# Patient Record
Sex: FEMALE | Race: WHITE | Employment: OTHER | URBAN - METROPOLITAN AREA
[De-identification: names, ages, dates, MRNs, and addresses within clinical notes are randomized per-mention and may not be internally consistent; named-entity substitution may affect disease eponyms.]

---

## 2018-03-13 ENCOUNTER — TELEPHONE (OUTPATIENT)
Dept: FAMILY MEDICINE CLINIC | Age: 68
End: 2018-03-13

## 2018-03-14 ENCOUNTER — OFFICE VISIT (OUTPATIENT)
Dept: FAMILY MEDICINE CLINIC | Age: 68
End: 2018-03-14
Payer: MEDICARE

## 2018-03-14 VITALS
SYSTOLIC BLOOD PRESSURE: 132 MMHG | HEART RATE: 93 BPM | HEIGHT: 61 IN | OXYGEN SATURATION: 100 % | BODY MASS INDEX: 33.42 KG/M2 | DIASTOLIC BLOOD PRESSURE: 70 MMHG | TEMPERATURE: 97.2 F | RESPIRATION RATE: 18 BRPM | WEIGHT: 177 LBS

## 2018-03-14 DIAGNOSIS — F32.0 MILD MAJOR DEPRESSION, SINGLE EPISODE (HCC): ICD-10-CM

## 2018-03-14 DIAGNOSIS — B96.89 ACUTE BACTERIAL SINUSITIS: Primary | ICD-10-CM

## 2018-03-14 DIAGNOSIS — F32.A DEPRESSION, UNSPECIFIED DEPRESSION TYPE: ICD-10-CM

## 2018-03-14 DIAGNOSIS — J45.20 MILD INTERMITTENT ASTHMA WITHOUT COMPLICATION: ICD-10-CM

## 2018-03-14 DIAGNOSIS — E78.5 HYPERLIPIDEMIA, UNSPECIFIED HYPERLIPIDEMIA TYPE: ICD-10-CM

## 2018-03-14 DIAGNOSIS — F17.200 TOBACCO DEPENDENCE SYNDROME: ICD-10-CM

## 2018-03-14 DIAGNOSIS — M05.79 RHEUMATOID ARTHRITIS INVOLVING MULTIPLE SITES WITH POSITIVE RHEUMATOID FACTOR (HCC): ICD-10-CM

## 2018-03-14 DIAGNOSIS — J01.90 ACUTE BACTERIAL SINUSITIS: Primary | ICD-10-CM

## 2018-03-14 DIAGNOSIS — F32.9 REACTIVE DEPRESSION: ICD-10-CM

## 2018-03-14 PROCEDURE — G8400 PT W/DXA NO RESULTS DOC: HCPCS | Performed by: FAMILY MEDICINE

## 2018-03-14 PROCEDURE — 1123F ACP DISCUSS/DSCN MKR DOCD: CPT | Performed by: FAMILY MEDICINE

## 2018-03-14 PROCEDURE — 4004F PT TOBACCO SCREEN RCVD TLK: CPT | Performed by: FAMILY MEDICINE

## 2018-03-14 PROCEDURE — 3014F SCREEN MAMMO DOC REV: CPT | Performed by: FAMILY MEDICINE

## 2018-03-14 PROCEDURE — 3017F COLORECTAL CA SCREEN DOC REV: CPT | Performed by: FAMILY MEDICINE

## 2018-03-14 PROCEDURE — G8417 CALC BMI ABV UP PARAM F/U: HCPCS | Performed by: FAMILY MEDICINE

## 2018-03-14 PROCEDURE — 4040F PNEUMOC VAC/ADMIN/RCVD: CPT | Performed by: FAMILY MEDICINE

## 2018-03-14 PROCEDURE — G8482 FLU IMMUNIZE ORDER/ADMIN: HCPCS | Performed by: FAMILY MEDICINE

## 2018-03-14 PROCEDURE — G8427 DOCREV CUR MEDS BY ELIG CLIN: HCPCS | Performed by: FAMILY MEDICINE

## 2018-03-14 PROCEDURE — 99214 OFFICE O/P EST MOD 30 MIN: CPT | Performed by: FAMILY MEDICINE

## 2018-03-14 PROCEDURE — 1090F PRES/ABSN URINE INCON ASSESS: CPT | Performed by: FAMILY MEDICINE

## 2018-03-14 RX ORDER — ATORVASTATIN CALCIUM 20 MG/1
20 TABLET, FILM COATED ORAL DAILY
Qty: 90 TABLET | Refills: 1 | Status: SHIPPED | OUTPATIENT
Start: 2018-03-14 | End: 2018-09-05 | Stop reason: SDUPTHER

## 2018-03-14 RX ORDER — CLINDAMYCIN HYDROCHLORIDE 150 MG/1
150 CAPSULE ORAL 3 TIMES DAILY
Qty: 30 CAPSULE | Refills: 0 | Status: SHIPPED | OUTPATIENT
Start: 2018-03-14 | End: 2018-03-24

## 2018-03-14 RX ORDER — ESCITALOPRAM OXALATE 10 MG/1
10 TABLET ORAL DAILY
Qty: 90 TABLET | Refills: 1 | Status: SHIPPED | OUTPATIENT
Start: 2018-03-14 | End: 2018-09-05 | Stop reason: SDUPTHER

## 2018-03-14 NOTE — PROGRESS NOTES
3/22/2018    Chief Complaint   Patient presents with    Sinusitis     sinus pressure pain           Subjective   patient is here with complaint of sinus infection with puss pocket in mouth, pressure pain in face with headache    She is on asulfadine for  rhem arthritis  1. Acute bacterial sinusitis      2. Hyperlipidemia, unspecified hyperlipidemia type  Tot 191  hdl 64  ldl 109  tg 92  - atorvastatin (LIPITOR) 20 MG tablet; Take 1 tablet by mouth daily  Dispense: 90 tablet; Refill: 1    3. Rheumatoid arthritis involving multiple sites with positive rheumatoid factor (HCC)  On asufadine  immunocompromised    4. Mild intermittent asthma without complication  Breathing is ok not wheezing    5. Tobacco dependence syndrome  Try to stop smokin    6. Depression, unspecified depression type    - escitalopram (LEXAPRO) 10 MG tablet; Take 1 tablet by mouth daily  Dispense: 90 tablet;  Refill: 1      Goals   Review Of Systems    General:  No weight change no malaise no fatigue no change in appetite no sleep disturbance + fever/chills no night sweats  Skin:                 no abnormal pigmentation, no rash, no scaling,no itching,no Masses,no  hair or nail changes  Eyes:               no blurring, no diplopia, no  eye pain no glaucoma no cataracts  ENT:                 no hearing loss,no  Tinnitus,no  Vertigo,no osebleed, no nasal congestion, no rhinorrhea,  no sore throat no jaw pain no hoarseness no bleeding  Gums   ___ dentures  Neck:                no node tenderness , not rigid, no masses   Respiratory:            + cough, no sputum,  No coughing blood, no pleuritic , no chest pain, no dyspnea,  no wheezing  Cardiovascular:         no angina,  No chest pain  No syncope, no pedal edema , no orthopnea, no PND, no palpitations, no claudication  Gastrointestinal  no nausea, no vomiting, no heartburn, no diarrhea, no constipation, no bloating,  no abdominal pain, no rectal pain, no bleeding no hemorrhoids, no

## 2018-03-16 DIAGNOSIS — J45.20 MILD INTERMITTENT ASTHMA WITHOUT COMPLICATION: ICD-10-CM

## 2018-03-16 RX ORDER — BUDESONIDE AND FORMOTEROL FUMARATE DIHYDRATE 160; 4.5 UG/1; UG/1
2 AEROSOL RESPIRATORY (INHALATION) 2 TIMES DAILY
Qty: 3 INHALER | Refills: 1 | Status: SHIPPED | OUTPATIENT
Start: 2018-03-16 | End: 2018-09-05 | Stop reason: SDUPTHER

## 2018-09-05 ENCOUNTER — OFFICE VISIT (OUTPATIENT)
Dept: FAMILY MEDICINE CLINIC | Age: 68
End: 2018-09-05
Payer: MEDICARE

## 2018-09-05 VITALS
RESPIRATION RATE: 18 BRPM | HEIGHT: 61 IN | HEART RATE: 103 BPM | DIASTOLIC BLOOD PRESSURE: 80 MMHG | BODY MASS INDEX: 34.93 KG/M2 | SYSTOLIC BLOOD PRESSURE: 118 MMHG | TEMPERATURE: 97.5 F | OXYGEN SATURATION: 92 % | WEIGHT: 185 LBS

## 2018-09-05 DIAGNOSIS — E78.5 HYPERLIPIDEMIA, UNSPECIFIED HYPERLIPIDEMIA TYPE: ICD-10-CM

## 2018-09-05 DIAGNOSIS — Z23 NEED FOR PROPHYLACTIC VACCINATION AND INOCULATION AGAINST VARICELLA: ICD-10-CM

## 2018-09-05 DIAGNOSIS — G47.00 INSOMNIA, UNSPECIFIED TYPE: ICD-10-CM

## 2018-09-05 DIAGNOSIS — J45.20 MILD INTERMITTENT ASTHMA WITHOUT COMPLICATION: ICD-10-CM

## 2018-09-05 DIAGNOSIS — F32.A DEPRESSION, UNSPECIFIED DEPRESSION TYPE: ICD-10-CM

## 2018-09-05 DIAGNOSIS — Z00.00 ROUTINE GENERAL MEDICAL EXAMINATION AT A HEALTH CARE FACILITY: ICD-10-CM

## 2018-09-05 DIAGNOSIS — Z23 NEED FOR VACCINATION: Primary | ICD-10-CM

## 2018-09-05 DIAGNOSIS — M06.9 RHEUMATOID ARTHRITIS, INVOLVING UNSPECIFIED SITE, UNSPECIFIED RHEUMATOID FACTOR PRESENCE: ICD-10-CM

## 2018-09-05 PROCEDURE — 4040F PNEUMOC VAC/ADMIN/RCVD: CPT | Performed by: FAMILY MEDICINE

## 2018-09-05 PROCEDURE — G0439 PPPS, SUBSEQ VISIT: HCPCS | Performed by: FAMILY MEDICINE

## 2018-09-05 PROCEDURE — 90662 IIV NO PRSV INCREASED AG IM: CPT | Performed by: FAMILY MEDICINE

## 2018-09-05 PROCEDURE — G0008 ADMIN INFLUENZA VIRUS VAC: HCPCS | Performed by: FAMILY MEDICINE

## 2018-09-05 RX ORDER — ESZOPICLONE 2 MG/1
2 TABLET, FILM COATED ORAL NIGHTLY
Qty: 90 TABLET | Refills: 0 | Status: SHIPPED | OUTPATIENT
Start: 2018-09-05 | End: 2018-12-04

## 2018-09-05 RX ORDER — ESCITALOPRAM OXALATE 10 MG/1
10 TABLET ORAL DAILY
Qty: 90 TABLET | Refills: 1 | Status: SHIPPED | OUTPATIENT
Start: 2018-09-05 | End: 2019-03-22 | Stop reason: SDUPTHER

## 2018-09-05 RX ORDER — ATORVASTATIN CALCIUM 20 MG/1
20 TABLET, FILM COATED ORAL DAILY
Qty: 90 TABLET | Refills: 1 | Status: SHIPPED | OUTPATIENT
Start: 2018-09-05 | End: 2019-03-22 | Stop reason: SDUPTHER

## 2018-09-05 RX ORDER — SULFASALAZINE 500 MG/1
500 TABLET, DELAYED RELEASE ORAL 2 TIMES DAILY
COMMUNITY
Start: 2018-06-04

## 2018-09-05 RX ORDER — CELECOXIB 200 MG/1
200 CAPSULE ORAL 2 TIMES DAILY
Qty: 180 CAPSULE | Refills: 1 | Status: SHIPPED | OUTPATIENT
Start: 2018-09-05

## 2018-09-05 RX ORDER — ALBUTEROL SULFATE 90 UG/1
2 AEROSOL, METERED RESPIRATORY (INHALATION) EVERY 6 HOURS PRN
Qty: 3 INHALER | Refills: 1 | Status: SHIPPED | OUTPATIENT
Start: 2018-09-05 | End: 2019-10-09 | Stop reason: SDUPTHER

## 2018-09-05 RX ORDER — BUDESONIDE AND FORMOTEROL FUMARATE DIHYDRATE 160; 4.5 UG/1; UG/1
2 AEROSOL RESPIRATORY (INHALATION) 2 TIMES DAILY
Qty: 3 INHALER | Refills: 1 | Status: SHIPPED | OUTPATIENT
Start: 2018-09-05 | End: 2019-03-22 | Stop reason: SDUPTHER

## 2018-09-05 ASSESSMENT — LIFESTYLE VARIABLES
AUDIT-C TOTAL SCORE: 2
HOW OFTEN DURING THE LAST YEAR HAVE YOU NEEDED AN ALCOHOLIC DRINK FIRST THING IN THE MORNING TO GET YOURSELF GOING AFTER A NIGHT OF HEAVY DRINKING: 0
HOW OFTEN DURING THE LAST YEAR HAVE YOU HAD A FEELING OF GUILT OR REMORSE AFTER DRINKING: 0
HOW OFTEN DO YOU HAVE A DRINK CONTAINING ALCOHOL: 2
HAS A RELATIVE, FRIEND, DOCTOR, OR ANOTHER HEALTH PROFESSIONAL EXPRESSED CONCERN ABOUT YOUR DRINKING OR SUGGESTED YOU CUT DOWN: 0
HOW OFTEN DURING THE LAST YEAR HAVE YOU FOUND THAT YOU WERE NOT ABLE TO STOP DRINKING ONCE YOU HAD STARTED: 0
HOW OFTEN DURING THE LAST YEAR HAVE YOU BEEN UNABLE TO REMEMBER WHAT HAPPENED THE NIGHT BEFORE BECAUSE YOU HAD BEEN DRINKING: 0
HOW OFTEN DO YOU HAVE SIX OR MORE DRINKS ON ONE OCCASION: 0
HAVE YOU OR SOMEONE ELSE BEEN INJURED AS A RESULT OF YOUR DRINKING: 0
AUDIT TOTAL SCORE: 2
HOW MANY STANDARD DRINKS CONTAINING ALCOHOL DO YOU HAVE ON A TYPICAL DAY: 0
HOW OFTEN DURING THE LAST YEAR HAVE YOU FAILED TO DO WHAT WAS NORMALLY EXPECTED FROM YOU BECAUSE OF DRINKING: 0

## 2018-09-05 ASSESSMENT — PATIENT HEALTH QUESTIONNAIRE - PHQ9
SUM OF ALL RESPONSES TO PHQ QUESTIONS 1-9: 0
SUM OF ALL RESPONSES TO PHQ QUESTIONS 1-9: 0

## 2018-09-05 ASSESSMENT — ANXIETY QUESTIONNAIRES: GAD7 TOTAL SCORE: 1

## 2019-01-08 LAB
BASOPHILS ABSOLUTE: NORMAL /ΜL
BASOPHILS RELATIVE PERCENT: NORMAL %
EOSINOPHILS ABSOLUTE: NORMAL /ΜL
EOSINOPHILS RELATIVE PERCENT: NORMAL %
HCT VFR BLD CALC: 44.2 % (ref 36–46)
HEMOGLOBIN: 14.6 G/DL (ref 12–16)
LYMPHOCYTES ABSOLUTE: NORMAL /ΜL
LYMPHOCYTES RELATIVE PERCENT: NORMAL %
MCH RBC QN AUTO: NORMAL PG
MCHC RBC AUTO-ENTMCNC: NORMAL G/DL
MCV RBC AUTO: NORMAL FL
MONOCYTES ABSOLUTE: NORMAL /ΜL
MONOCYTES RELATIVE PERCENT: NORMAL %
NEUTROPHILS ABSOLUTE: NORMAL /ΜL
NEUTROPHILS RELATIVE PERCENT: NORMAL %
PLATELET # BLD: NORMAL K/ΜL
PMV BLD AUTO: NORMAL FL
RBC # BLD: NORMAL 10^6/ΜL
WBC # BLD: NORMAL 10^3/ML

## 2019-03-20 LAB
ALBUMIN SERPL-MCNC: NORMAL G/DL
ALP BLD-CCNC: NORMAL U/L
ALT SERPL-CCNC: NORMAL U/L
ANION GAP SERPL CALCULATED.3IONS-SCNC: NORMAL MMOL/L
AST SERPL-CCNC: NORMAL U/L
BILIRUB SERPL-MCNC: NORMAL MG/DL (ref 0.1–1.4)
BUN BLDV-MCNC: NORMAL MG/DL
CALCIUM SERPL-MCNC: NORMAL MG/DL
CHLORIDE BLD-SCNC: NORMAL MMOL/L
CHOLESTEROL, TOTAL: 206 MG/DL
CHOLESTEROL/HDL RATIO: 2.5
CO2: NORMAL MMOL/L
CREAT SERPL-MCNC: NORMAL MG/DL
GFR CALCULATED: NORMAL
GLUCOSE BLD-MCNC: 90 MG/DL
HCT VFR BLD CALC: 46.7 % (ref 36–46)
HDLC SERPL-MCNC: 82 MG/DL (ref 35–70)
HEMOGLOBIN: 14.7 G/DL (ref 12–16)
LDL CHOLESTEROL CALCULATED: 107 MG/DL (ref 0–160)
PLATELET # BLD: ABNORMAL K/ΜL
POTASSIUM SERPL-SCNC: NORMAL MMOL/L
SODIUM BLD-SCNC: NORMAL MMOL/L
TOTAL PROTEIN: NORMAL
TRIGL SERPL-MCNC: 83 MG/DL
VLDLC SERPL CALC-MCNC: ABNORMAL MG/DL
WBC # BLD: ABNORMAL 10^3/ML

## 2019-03-22 ENCOUNTER — OFFICE VISIT (OUTPATIENT)
Dept: FAMILY MEDICINE CLINIC | Age: 69
End: 2019-03-22
Payer: MEDICARE

## 2019-03-22 VITALS
SYSTOLIC BLOOD PRESSURE: 124 MMHG | RESPIRATION RATE: 16 BRPM | BODY MASS INDEX: 35.44 KG/M2 | WEIGHT: 192.6 LBS | HEART RATE: 86 BPM | OXYGEN SATURATION: 93 % | HEIGHT: 62 IN | DIASTOLIC BLOOD PRESSURE: 70 MMHG | TEMPERATURE: 97.1 F

## 2019-03-22 DIAGNOSIS — M05.79 RHEUMATOID ARTHRITIS INVOLVING MULTIPLE SITES WITH POSITIVE RHEUMATOID FACTOR (HCC): ICD-10-CM

## 2019-03-22 DIAGNOSIS — J45.20 MILD INTERMITTENT ASTHMA WITHOUT COMPLICATION: ICD-10-CM

## 2019-03-22 DIAGNOSIS — F32.A DEPRESSION, UNSPECIFIED DEPRESSION TYPE: ICD-10-CM

## 2019-03-22 DIAGNOSIS — E78.5 HYPERLIPIDEMIA, UNSPECIFIED HYPERLIPIDEMIA TYPE: ICD-10-CM

## 2019-03-22 DIAGNOSIS — F32.0 MAJOR DEPRESSIVE DISORDER, SINGLE EPISODE, MILD (HCC): ICD-10-CM

## 2019-03-22 DIAGNOSIS — J30.1 SEASONAL ALLERGIC RHINITIS DUE TO POLLEN: Primary | ICD-10-CM

## 2019-03-22 PROCEDURE — G8482 FLU IMMUNIZE ORDER/ADMIN: HCPCS | Performed by: FAMILY MEDICINE

## 2019-03-22 PROCEDURE — 3017F COLORECTAL CA SCREEN DOC REV: CPT | Performed by: FAMILY MEDICINE

## 2019-03-22 PROCEDURE — 4040F PNEUMOC VAC/ADMIN/RCVD: CPT | Performed by: FAMILY MEDICINE

## 2019-03-22 PROCEDURE — G8400 PT W/DXA NO RESULTS DOC: HCPCS | Performed by: FAMILY MEDICINE

## 2019-03-22 PROCEDURE — 1090F PRES/ABSN URINE INCON ASSESS: CPT | Performed by: FAMILY MEDICINE

## 2019-03-22 PROCEDURE — 1101F PT FALLS ASSESS-DOCD LE1/YR: CPT | Performed by: FAMILY MEDICINE

## 2019-03-22 PROCEDURE — G8417 CALC BMI ABV UP PARAM F/U: HCPCS | Performed by: FAMILY MEDICINE

## 2019-03-22 PROCEDURE — 4004F PT TOBACCO SCREEN RCVD TLK: CPT | Performed by: FAMILY MEDICINE

## 2019-03-22 PROCEDURE — 1123F ACP DISCUSS/DSCN MKR DOCD: CPT | Performed by: FAMILY MEDICINE

## 2019-03-22 PROCEDURE — 99214 OFFICE O/P EST MOD 30 MIN: CPT | Performed by: FAMILY MEDICINE

## 2019-03-22 PROCEDURE — G8427 DOCREV CUR MEDS BY ELIG CLIN: HCPCS | Performed by: FAMILY MEDICINE

## 2019-03-26 RX ORDER — ATORVASTATIN CALCIUM 20 MG/1
20 TABLET, FILM COATED ORAL DAILY
Qty: 90 TABLET | Refills: 1 | Status: SHIPPED | OUTPATIENT
Start: 2019-03-26 | End: 2019-10-09 | Stop reason: SDUPTHER

## 2019-03-26 RX ORDER — ESCITALOPRAM OXALATE 10 MG/1
10 TABLET ORAL DAILY
Qty: 90 TABLET | Refills: 1 | Status: SHIPPED | OUTPATIENT
Start: 2019-03-26 | End: 2019-10-09 | Stop reason: SDUPTHER

## 2019-03-26 RX ORDER — BUDESONIDE AND FORMOTEROL FUMARATE DIHYDRATE 160; 4.5 UG/1; UG/1
2 AEROSOL RESPIRATORY (INHALATION) 2 TIMES DAILY
Qty: 3 INHALER | Refills: 1 | Status: SHIPPED | OUTPATIENT
Start: 2019-03-26 | End: 2019-10-09 | Stop reason: SDUPTHER

## 2019-10-09 ENCOUNTER — OFFICE VISIT (OUTPATIENT)
Dept: FAMILY MEDICINE CLINIC | Age: 69
End: 2019-10-09
Payer: MEDICARE

## 2019-10-09 VITALS
DIASTOLIC BLOOD PRESSURE: 74 MMHG | HEART RATE: 70 BPM | TEMPERATURE: 98.1 F | WEIGHT: 195 LBS | OXYGEN SATURATION: 95 % | RESPIRATION RATE: 16 BRPM | BODY MASS INDEX: 35.88 KG/M2 | HEIGHT: 62 IN | SYSTOLIC BLOOD PRESSURE: 132 MMHG

## 2019-10-09 DIAGNOSIS — Z87.891 PERSONAL HISTORY OF TOBACCO USE: ICD-10-CM

## 2019-10-09 DIAGNOSIS — Z00.00 ROUTINE GENERAL MEDICAL EXAMINATION AT A HEALTH CARE FACILITY: ICD-10-CM

## 2019-10-09 DIAGNOSIS — E78.00 PURE HYPERCHOLESTEROLEMIA: ICD-10-CM

## 2019-10-09 DIAGNOSIS — F32.A DEPRESSION, UNSPECIFIED DEPRESSION TYPE: ICD-10-CM

## 2019-10-09 DIAGNOSIS — E78.5 HYPERLIPIDEMIA, UNSPECIFIED HYPERLIPIDEMIA TYPE: ICD-10-CM

## 2019-10-09 DIAGNOSIS — J45.20 MILD INTERMITTENT ASTHMA WITHOUT COMPLICATION: ICD-10-CM

## 2019-10-09 DIAGNOSIS — Z12.31 ENCOUNTER FOR SCREENING MAMMOGRAM FOR BREAST CANCER: ICD-10-CM

## 2019-10-09 PROCEDURE — G8427 DOCREV CUR MEDS BY ELIG CLIN: HCPCS | Performed by: FAMILY MEDICINE

## 2019-10-09 PROCEDURE — 1090F PRES/ABSN URINE INCON ASSESS: CPT | Performed by: FAMILY MEDICINE

## 2019-10-09 PROCEDURE — 3017F COLORECTAL CA SCREEN DOC REV: CPT | Performed by: FAMILY MEDICINE

## 2019-10-09 PROCEDURE — 4040F PNEUMOC VAC/ADMIN/RCVD: CPT | Performed by: FAMILY MEDICINE

## 2019-10-09 PROCEDURE — 99213 OFFICE O/P EST LOW 20 MIN: CPT | Performed by: FAMILY MEDICINE

## 2019-10-09 PROCEDURE — 4004F PT TOBACCO SCREEN RCVD TLK: CPT | Performed by: FAMILY MEDICINE

## 2019-10-09 PROCEDURE — 90653 IIV ADJUVANT VACCINE IM: CPT | Performed by: FAMILY MEDICINE

## 2019-10-09 PROCEDURE — G8417 CALC BMI ABV UP PARAM F/U: HCPCS | Performed by: FAMILY MEDICINE

## 2019-10-09 PROCEDURE — 1123F ACP DISCUSS/DSCN MKR DOCD: CPT | Performed by: FAMILY MEDICINE

## 2019-10-09 PROCEDURE — G0008 ADMIN INFLUENZA VIRUS VAC: HCPCS | Performed by: FAMILY MEDICINE

## 2019-10-09 PROCEDURE — G8482 FLU IMMUNIZE ORDER/ADMIN: HCPCS | Performed by: FAMILY MEDICINE

## 2019-10-09 PROCEDURE — G8400 PT W/DXA NO RESULTS DOC: HCPCS | Performed by: FAMILY MEDICINE

## 2019-10-09 RX ORDER — ALBUTEROL SULFATE 90 UG/1
2 AEROSOL, METERED RESPIRATORY (INHALATION) EVERY 6 HOURS PRN
Qty: 3 INHALER | Refills: 1 | Status: SHIPPED | OUTPATIENT
Start: 2019-10-09 | End: 2019-10-15 | Stop reason: SDUPTHER

## 2019-10-09 RX ORDER — BUDESONIDE AND FORMOTEROL FUMARATE DIHYDRATE 160; 4.5 UG/1; UG/1
2 AEROSOL RESPIRATORY (INHALATION) 2 TIMES DAILY
Qty: 3 INHALER | Refills: 1 | Status: SHIPPED
Start: 2019-10-09 | End: 2020-04-08 | Stop reason: SDUPTHER

## 2019-10-09 RX ORDER — ESCITALOPRAM OXALATE 10 MG/1
10 TABLET ORAL DAILY
Qty: 90 TABLET | Refills: 1 | Status: SHIPPED
Start: 2019-10-09 | End: 2020-04-08 | Stop reason: SDUPTHER

## 2019-10-09 RX ORDER — ATORVASTATIN CALCIUM 20 MG/1
20 TABLET, FILM COATED ORAL DAILY
Qty: 90 TABLET | Refills: 1 | Status: SHIPPED
Start: 2019-10-09 | End: 2020-04-08 | Stop reason: SDUPTHER

## 2019-10-09 ASSESSMENT — LIFESTYLE VARIABLES
HOW OFTEN DURING THE LAST YEAR HAVE YOU FAILED TO DO WHAT WAS NORMALLY EXPECTED FROM YOU BECAUSE OF DRINKING: 0
HOW MANY STANDARD DRINKS CONTAINING ALCOHOL DO YOU HAVE ON A TYPICAL DAY: 0
AUDIT TOTAL SCORE: 2
HOW OFTEN DURING THE LAST YEAR HAVE YOU HAD A FEELING OF GUILT OR REMORSE AFTER DRINKING: 0
HOW OFTEN DURING THE LAST YEAR HAVE YOU FOUND THAT YOU WERE NOT ABLE TO STOP DRINKING ONCE YOU HAD STARTED: 0
AUDIT-C TOTAL SCORE: 2
HOW OFTEN DURING THE LAST YEAR HAVE YOU BEEN UNABLE TO REMEMBER WHAT HAPPENED THE NIGHT BEFORE BECAUSE YOU HAD BEEN DRINKING: 0
HOW OFTEN DO YOU HAVE SIX OR MORE DRINKS ON ONE OCCASION: 0
HOW OFTEN DURING THE LAST YEAR HAVE YOU NEEDED AN ALCOHOLIC DRINK FIRST THING IN THE MORNING TO GET YOURSELF GOING AFTER A NIGHT OF HEAVY DRINKING: 0
HAVE YOU OR SOMEONE ELSE BEEN INJURED AS A RESULT OF YOUR DRINKING: 0
HAS A RELATIVE, FRIEND, DOCTOR, OR ANOTHER HEALTH PROFESSIONAL EXPRESSED CONCERN ABOUT YOUR DRINKING OR SUGGESTED YOU CUT DOWN: 0
HOW OFTEN DO YOU HAVE A DRINK CONTAINING ALCOHOL: 2

## 2019-10-09 ASSESSMENT — PATIENT HEALTH QUESTIONNAIRE - PHQ9
1. LITTLE INTEREST OR PLEASURE IN DOING THINGS: 1
2. FEELING DOWN, DEPRESSED OR HOPELESS: 1
SUM OF ALL RESPONSES TO PHQ9 QUESTIONS 1 & 2: 2
SUM OF ALL RESPONSES TO PHQ QUESTIONS 1-9: 2
SUM OF ALL RESPONSES TO PHQ QUESTIONS 1-9: 2

## 2019-10-15 DIAGNOSIS — J45.20 MILD INTERMITTENT ASTHMA WITHOUT COMPLICATION: ICD-10-CM

## 2019-10-15 RX ORDER — ALBUTEROL SULFATE 90 UG/1
2 AEROSOL, METERED RESPIRATORY (INHALATION) EVERY 6 HOURS PRN
Qty: 3 INHALER | Refills: 1 | Status: SHIPPED
Start: 2019-10-15 | End: 2020-04-08 | Stop reason: SDUPTHER

## 2019-11-11 LAB
ALT SERPL-CCNC: 24 U/L
AST SERPL-CCNC: 18 U/L
BASOPHILS ABSOLUTE: ABNORMAL
BASOPHILS RELATIVE PERCENT: ABNORMAL
EOSINOPHILS ABSOLUTE: ABNORMAL
EOSINOPHILS RELATIVE PERCENT: ABNORMAL
HCT VFR BLD CALC: 46.5 % (ref 36–46)
HEMOGLOBIN: 15.5 G/DL (ref 12–16)
LYMPHOCYTES ABSOLUTE: ABNORMAL
LYMPHOCYTES RELATIVE PERCENT: ABNORMAL
MCH RBC QN AUTO: ABNORMAL PG
MCHC RBC AUTO-ENTMCNC: ABNORMAL G/DL
MCV RBC AUTO: ABNORMAL FL
MONOCYTES ABSOLUTE: ABNORMAL
MONOCYTES RELATIVE PERCENT: ABNORMAL
NEUTROPHILS ABSOLUTE: ABNORMAL
NEUTROPHILS RELATIVE PERCENT: ABNORMAL
PLATELET # BLD: ABNORMAL 10*3/UL
PMV BLD AUTO: ABNORMAL FL
RBC # BLD: ABNORMAL 10*6/UL
WBC # BLD: ABNORMAL 10*3/UL

## 2020-01-08 LAB
BASOPHILS ABSOLUTE: NORMAL
BASOPHILS RELATIVE PERCENT: NORMAL
EOSINOPHILS ABSOLUTE: NORMAL
EOSINOPHILS RELATIVE PERCENT: NORMAL
HCT VFR BLD CALC: 44.9 % (ref 36–46)
HEMOGLOBIN: 14.9 G/DL (ref 12–16)
LYMPHOCYTES ABSOLUTE: NORMAL
LYMPHOCYTES RELATIVE PERCENT: NORMAL
MCH RBC QN AUTO: NORMAL PG
MCHC RBC AUTO-ENTMCNC: NORMAL G/DL
MCV RBC AUTO: NORMAL FL
MONOCYTES ABSOLUTE: NORMAL
MONOCYTES RELATIVE PERCENT: NORMAL
NEUTROPHILS ABSOLUTE: NORMAL
NEUTROPHILS RELATIVE PERCENT: NORMAL
PLATELET # BLD: NORMAL 10*3/UL
PMV BLD AUTO: NORMAL FL
RBC # BLD: NORMAL 10*6/UL
WBC # BLD: NORMAL 10*3/UL

## 2020-04-08 RX ORDER — ATORVASTATIN CALCIUM 20 MG/1
20 TABLET, FILM COATED ORAL DAILY
Qty: 90 TABLET | Refills: 1 | Status: SHIPPED
Start: 2020-04-08 | End: 2020-10-14 | Stop reason: SDUPTHER

## 2020-04-08 RX ORDER — ALBUTEROL SULFATE 90 UG/1
2 AEROSOL, METERED RESPIRATORY (INHALATION) EVERY 6 HOURS PRN
Qty: 3 INHALER | Refills: 1 | Status: SHIPPED
Start: 2020-04-08 | End: 2021-04-21 | Stop reason: SDUPTHER

## 2020-04-08 RX ORDER — ESCITALOPRAM OXALATE 10 MG/1
10 TABLET ORAL DAILY
Qty: 90 TABLET | Refills: 1 | Status: SHIPPED
Start: 2020-04-08 | End: 2020-10-14 | Stop reason: SDUPTHER

## 2020-04-08 RX ORDER — BUDESONIDE AND FORMOTEROL FUMARATE DIHYDRATE 160; 4.5 UG/1; UG/1
2 AEROSOL RESPIRATORY (INHALATION) 2 TIMES DAILY
Qty: 3 INHALER | Refills: 1 | Status: SHIPPED
Start: 2020-04-08 | End: 2020-10-14 | Stop reason: SDUPTHER

## 2020-10-12 RX ORDER — ATORVASTATIN CALCIUM 20 MG/1
TABLET, FILM COATED ORAL
Qty: 90 TABLET | Refills: 1 | OUTPATIENT
Start: 2020-10-12

## 2020-10-14 ENCOUNTER — VIRTUAL VISIT (OUTPATIENT)
Dept: FAMILY MEDICINE CLINIC | Age: 70
End: 2020-10-14
Payer: MEDICARE

## 2020-10-14 PROCEDURE — G0439 PPPS, SUBSEQ VISIT: HCPCS | Performed by: FAMILY MEDICINE

## 2020-10-14 PROCEDURE — 4040F PNEUMOC VAC/ADMIN/RCVD: CPT | Performed by: FAMILY MEDICINE

## 2020-10-14 PROCEDURE — 1123F ACP DISCUSS/DSCN MKR DOCD: CPT | Performed by: FAMILY MEDICINE

## 2020-10-14 PROCEDURE — 3017F COLORECTAL CA SCREEN DOC REV: CPT | Performed by: FAMILY MEDICINE

## 2020-10-14 PROCEDURE — G8484 FLU IMMUNIZE NO ADMIN: HCPCS | Performed by: FAMILY MEDICINE

## 2020-10-14 RX ORDER — BUDESONIDE AND FORMOTEROL FUMARATE DIHYDRATE 160; 4.5 UG/1; UG/1
2 AEROSOL RESPIRATORY (INHALATION) 2 TIMES DAILY
Qty: 3 INHALER | Refills: 1 | Status: SHIPPED
Start: 2020-10-14 | End: 2021-04-21 | Stop reason: SDUPTHER

## 2020-10-14 RX ORDER — ESCITALOPRAM OXALATE 10 MG/1
10 TABLET ORAL DAILY
Qty: 90 TABLET | Refills: 1 | Status: SHIPPED
Start: 2020-10-14 | End: 2021-04-21 | Stop reason: SDUPTHER

## 2020-10-14 RX ORDER — ATORVASTATIN CALCIUM 20 MG/1
20 TABLET, FILM COATED ORAL DAILY
Qty: 90 TABLET | Refills: 1 | Status: SHIPPED
Start: 2020-10-14 | End: 2020-10-14 | Stop reason: CLARIF

## 2020-10-14 ASSESSMENT — PATIENT HEALTH QUESTIONNAIRE - PHQ9
SUM OF ALL RESPONSES TO PHQ QUESTIONS 1-9: 0
2. FEELING DOWN, DEPRESSED OR HOPELESS: 0
1. LITTLE INTEREST OR PLEASURE IN DOING THINGS: 0
SUM OF ALL RESPONSES TO PHQ9 QUESTIONS 1 & 2: 0

## 2020-10-14 ASSESSMENT — LIFESTYLE VARIABLES
AUDIT TOTAL SCORE: 2
HOW MANY STANDARD DRINKS CONTAINING ALCOHOL DO YOU HAVE ON A TYPICAL DAY: 0
HOW OFTEN DURING THE LAST YEAR HAVE YOU FAILED TO DO WHAT WAS NORMALLY EXPECTED FROM YOU BECAUSE OF DRINKING: 0
HOW OFTEN DO YOU HAVE A DRINK CONTAINING ALCOHOL: 2
HAS A RELATIVE, FRIEND, DOCTOR, OR ANOTHER HEALTH PROFESSIONAL EXPRESSED CONCERN ABOUT YOUR DRINKING OR SUGGESTED YOU CUT DOWN: 0
HOW OFTEN DURING THE LAST YEAR HAVE YOU HAD A FEELING OF GUILT OR REMORSE AFTER DRINKING: 0
HOW OFTEN DURING THE LAST YEAR HAVE YOU NEEDED AN ALCOHOLIC DRINK FIRST THING IN THE MORNING TO GET YOURSELF GOING AFTER A NIGHT OF HEAVY DRINKING: 0
HAVE YOU OR SOMEONE ELSE BEEN INJURED AS A RESULT OF YOUR DRINKING: 0
HOW OFTEN DURING THE LAST YEAR HAVE YOU BEEN UNABLE TO REMEMBER WHAT HAPPENED THE NIGHT BEFORE BECAUSE YOU HAD BEEN DRINKING: 0
AUDIT-C TOTAL SCORE: 2
HOW OFTEN DO YOU HAVE SIX OR MORE DRINKS ON ONE OCCASION: 0
HOW OFTEN DURING THE LAST YEAR HAVE YOU FOUND THAT YOU WERE NOT ABLE TO STOP DRINKING ONCE YOU HAD STARTED: 0

## 2020-10-14 NOTE — PATIENT INSTRUCTIONS
Personalized Preventive Plan for Naeem Krishnamurthy - 10/14/2020  Medicare offers a range of preventive health benefits. Some of the tests and screenings are paid in full while other may be subject to a deductible, co-insurance, and/or copay. Some of these benefits include a comprehensive review of your medical history including lifestyle, illnesses that may run in your family, and various assessments and screenings as appropriate. After reviewing your medical record and screening and assessments performed today your provider may have ordered immunizations, labs, imaging, and/or referrals for you. A list of these orders (if applicable) as well as your Preventive Care list are included within your After Visit Summary for your review. Other Preventive Recommendations:    · A preventive eye exam performed by an eye specialist is recommended every 1-2 years to screen for glaucoma; cataracts, macular degeneration, and other eye disorders. · A preventive dental visit is recommended every 6 months. · Try to get at least 150 minutes of exercise per week or 10,000 steps per day on a pedometer . · Order or download the FREE \"Exercise & Physical Activity: Your Everyday Guide\" from [employer]. Call 6-404.402.5129 or search [employer] online. · You need 1951-0373 mg of calcium and 9009-0631 IU of vitamin D per day. It is possible to meet your calcium requirement with diet alone, but a vitamin D supplement is usually necessary to meet this goal.  · When exposed to the sun, use a sunscreen that protects against both UVA and UVB radiation with an SPF of 30 or greater. Reapply every 2 to 3 hours or after sweating, drying off with a towel, or swimming. · Always wear a seat belt when traveling in a car. Always wear a helmet when riding a bicycle or motorcycle.

## 2020-10-14 NOTE — PROGRESS NOTES
[facility] Empaneled Provider    Wt Readings from Last 3 Encounters:   10/09/19 195 lb (88.5 kg)   03/22/19 192 lb 9.6 oz (87.4 kg)   09/05/18 185 lb (83.9 kg)      No flowsheet data found. There is no height or weight on file to calculate BMI. Based upon direct observation of the patient, evaluation of cognition reveals recent and remote memory intact. Positive Risk Factor Screenings with Interventions:     Health Habits/Nutrition:  Health Habits/Nutrition  Do you exercise for at least 20 minutes 2-3 times per week?: Yes  Have you lost any weight without trying in the past 3 months?: No  Do you eat fewer than 2 meals per day?: No  Have you seen a dentist within the past year?: Yes     Health Habits/Nutrition Interventions:  · Inadequate physical activity:  patient is not ready to increase his/her physical activity level at this time    Hearing/Vision:  No exam data present  Hearing/Vision  Do you or your family notice any trouble with your hearing?: No  Do you have difficulty driving, watching TV, or doing any of your daily activities because of your eyesight?: No  Have you had an eye exam within the past year?: (!) No  Hearing/Vision Interventions:  · Vision concerns:  ophthalmology/optometry referral provided    ADL:  ADLs  In the past 7 days, did you need help from others to perform any of the following everyday activities? Eating, dressing, grooming, bathing, toileting, or walking/balance?: None  In the past 7 days, did you need help from others to take care of any of the following?  Laundry, housekeeping, banking/finances, shopping, telephone use, food preparation, transportation, or taking medications?: (!) Shopping, Banking/Finances  ADL Interventions:  · Patient declines any further evaluation/treatment for this issue    Personalized Preventive Plan   Current Health Maintenance Status  Immunization History   Administered Date(s) Administered    Influenza Virus Vaccine 10/15/2014, 09/14/2015, 09/25/2017  Influenza, High Dose (Fluzone 65 yrs and older) 10/17/2016, 09/05/2018    Influenza, Quadv, adjuvanted, 65 yrs +, IM, PF (Fluad) 09/14/2020    Influenza, Triv, inactivated, subunit, adjuvanted, IM (Fluad 65 yrs and older) 10/09/2019    Pneumococcal Conjugate 13-valent ([id number]) 10/13/2015    Pneumococcal Polysaccharide ([id number]) 10/17/2016    Zoster Recombinant (Shingrix) 09/17/2018, 01/26/2019        Health Maintenance   Topic Date Due    Hepatitis C screen  1950    DTaP/Tdap/Td vaccine (1 - Tdap) 09/30/1969    Breast cancer screen  05/09/2019    Annual Wellness Visit (AWV)  05/29/2019    Lipid screen  03/20/2020    Colon cancer screen colonoscopy  05/05/2020    Flu vaccine  Completed    Shingles Vaccine  Completed    Pneumococcal 65+ years Vaccine  Completed    DEXA (modify frequency per FRAX score)  Addressed    Hepatitis A vaccine  Aged Out    Hepatitis B vaccine  Aged Out    Hib vaccine  Aged Out    Meningococcal (ACWY) vaccine  Aged Out     Recommendations for Personal Style Finder Due: see orders and patient instructions/AVS.  . Recommended screening schedule for the next 5-10 years is provided to the patient in written form: see Patient Demetrio Amor was seen today for medicare awv, other and medication refill. Diagnoses and all orders for this visit:    Other screening mammogram  -     [facility] DIGITAL SCREEN BILATERAL PER PROTOCOL; Future    Hyperlipidemia, unspecified hyperlipidemia type  -     atorvastatin (LIPITOR) 20 MG tablet; Take 1 tablet by mouth daily  -     CBC Auto Differential; Future  -     Comprehensive Metabolic Panel; Future  -     Lipid Panel; Future  -     TSH without Reflex; Future    Mild intermittent asthma without complication  -     SYMBICORT 160-4.5 MCG/ACT AERO; Inhale 2 puffs into the lungs 2 times daily    Depression, unspecified depression type  -     escitalopram (LEXAPRO) 10 MG tablet;  Take 1 tablet by mouth daily    Rheumatoid arthritis involving multiple sites with positive rheumatoid factor ([facility] [address]    Tobacco dependence syndrome              Allegra Alejandro is a 79 y.o. female being evaluated by a Virtual Visit (phone) encounter to address concerns as mentioned above. A caregiver was present when appropriate. Due to this being a TeleHealth encounter (During [facility] public health emergency), evaluation of the following organ systems was limited: Vitals/Constitutional/EENT/Resp/CV/GI//MS/Neuro/Skin/Heme-Lymph-Imm. Pursuant to the emergency declaration under the [address] [place] [address], [address] [place] [address] authority and the [employer] Act, this Virtual Visit was conducted with patient's (and/or legal guardian's) consent, to reduce the patient's risk of exposure to COVID-19 and provide necessary medical care. The patient (and/or legal guardian) has also been advised to contact this office for worsening conditions or problems, and seek emergency medical treatment and/or call 911 if deemed necessary. Patient identification was verified at the start of the visit: Yes    Services were provided through phone to substitute for in-person clinic visit. Patient and provider were located at their individual homes. --David Mistry MD on 10/14/2020 at 2:16 PM    An electronic signature was used to authenticate this note.

## 2020-10-28 LAB
BASOPHILS ABSOLUTE: NORMAL
BASOPHILS RELATIVE PERCENT: NORMAL
CHOLESTEROL, TOTAL: 196 MG/DL
CHOLESTEROL/HDL RATIO: 3
EOSINOPHILS ABSOLUTE: NORMAL
EOSINOPHILS RELATIVE PERCENT: NORMAL
HCT VFR BLD CALC: NORMAL %
HDLC SERPL-MCNC: 62 MG/DL (ref 35–70)
HEMOGLOBIN: NORMAL
LDL CHOLESTEROL CALCULATED: 116 MG/DL (ref 0–160)
LYMPHOCYTES ABSOLUTE: NORMAL
LYMPHOCYTES RELATIVE PERCENT: NORMAL
MCH RBC QN AUTO: NORMAL PG
MCHC RBC AUTO-ENTMCNC: NORMAL G/DL
MCV RBC AUTO: NORMAL FL
MONOCYTES ABSOLUTE: NORMAL
MONOCYTES RELATIVE PERCENT: NORMAL
NEUTROPHILS ABSOLUTE: NORMAL
NEUTROPHILS RELATIVE PERCENT: NORMAL
NONHDLC SERPL-MCNC: NORMAL MG/DL
PLATELET # BLD: NORMAL 10*3/UL
PMV BLD AUTO: NORMAL FL
RBC # BLD: NORMAL 10*6/UL
TRIGL SERPL-MCNC: 90 MG/DL
VLDLC SERPL CALC-MCNC: NORMAL MG/DL
WBC # BLD: NORMAL 10*3/UL

## 2020-11-13 ENCOUNTER — TELEPHONE (OUTPATIENT)
Dept: FAMILY MEDICINE CLINIC | Age: 70
End: 2020-11-13

## 2020-11-13 ENCOUNTER — TELEPHONE (OUTPATIENT)
Dept: ADMINISTRATIVE | Age: 70
End: 2020-11-13

## 2021-04-14 ENCOUNTER — VIRTUAL VISIT (OUTPATIENT)
Dept: FAMILY MEDICINE CLINIC | Age: 71
End: 2021-04-14
Payer: MEDICARE

## 2021-04-14 DIAGNOSIS — J45.20 MILD INTERMITTENT ASTHMA WITHOUT COMPLICATION: ICD-10-CM

## 2021-04-14 DIAGNOSIS — F32.0 MAJOR DEPRESSIVE DISORDER, SINGLE EPISODE, MILD (HCC): ICD-10-CM

## 2021-04-14 DIAGNOSIS — E78.5 HYPERLIPIDEMIA, UNSPECIFIED HYPERLIPIDEMIA TYPE: Primary | ICD-10-CM

## 2021-04-14 DIAGNOSIS — F32.A DEPRESSION, UNSPECIFIED DEPRESSION TYPE: ICD-10-CM

## 2021-04-14 DIAGNOSIS — M05.79 RHEUMATOID ARTHRITIS INVOLVING MULTIPLE SITES WITH POSITIVE RHEUMATOID FACTOR (HCC): ICD-10-CM

## 2021-04-14 PROCEDURE — 1090F PRES/ABSN URINE INCON ASSESS: CPT | Performed by: FAMILY MEDICINE

## 2021-04-14 PROCEDURE — 3017F COLORECTAL CA SCREEN DOC REV: CPT | Performed by: FAMILY MEDICINE

## 2021-04-14 PROCEDURE — G8421 BMI NOT CALCULATED: HCPCS | Performed by: FAMILY MEDICINE

## 2021-04-14 PROCEDURE — 99213 OFFICE O/P EST LOW 20 MIN: CPT | Performed by: FAMILY MEDICINE

## 2021-04-14 PROCEDURE — 1123F ACP DISCUSS/DSCN MKR DOCD: CPT | Performed by: FAMILY MEDICINE

## 2021-04-14 PROCEDURE — G8427 DOCREV CUR MEDS BY ELIG CLIN: HCPCS | Performed by: FAMILY MEDICINE

## 2021-04-14 PROCEDURE — 4004F PT TOBACCO SCREEN RCVD TLK: CPT | Performed by: FAMILY MEDICINE

## 2021-04-14 PROCEDURE — G8400 PT W/DXA NO RESULTS DOC: HCPCS | Performed by: FAMILY MEDICINE

## 2021-04-14 PROCEDURE — 4040F PNEUMOC VAC/ADMIN/RCVD: CPT | Performed by: FAMILY MEDICINE

## 2021-04-14 RX ORDER — ATORVASTATIN CALCIUM 20 MG/1
TABLET, FILM COATED ORAL
COMMUNITY
Start: 2021-02-09 | End: 2021-04-14 | Stop reason: SDUPTHER

## 2021-04-14 SDOH — HEALTH STABILITY: MENTAL HEALTH: HOW MANY STANDARD DRINKS CONTAINING ALCOHOL DO YOU HAVE ON A TYPICAL DAY?: 1 OR 2

## 2021-04-14 NOTE — PROGRESS NOTES
several years ago, unchanged since that time. She denies current suicidal and homicidal plan or intent. Family history significant for no psychiatric illness. Possible organic causes contributing are: life events. Risk factors: none Previous treatment includes Lexapro and individual therapy. She complains of the following side effects from the treatment: none. - escitalopram (LEXAPRO) 10 MG tablet; Take 1 tablet by mouth daily  Dispense: 90 tablet; Refill: 1    Stable, cont meds recheck 6 mos    3. Hyperlipidemia, unspecified hyperlipidemia type  The ASCVD Risk score (Barbara Adam, et al., 2013) failed to calculate for the following reasons: The systolic blood pressure is missing  Hyperlipidemia: Patient presents with [id number]. She was tested because it is high2. Her last labs showed Total cholesterol of 196, HDL 62, ,  Triglycerides 90. none. There is not a family history of hyperlipidemia. There is not a family history of early ischemia heart disease.    - atorvastatin (LIPITOR) 20 MG tablet; Take 1 tablet by mouth daily  Dispense: 90 tablet; Refill: 1  Stable, cont meds recheck 6 mos    4. Rheumatoid arthritis involving multiple sites with positive rheumatoid factor (HCC)  On sulfasalizine and is stable   Sees dr Kirit Ibarra regularly  Stable, cont meds recheck 6 mos      5. Major depressive disorder, single episode, mild ([address]  See above ote on depression               SUBJECTIVE    Review of Systems   Constitutional: Negative for activity change, appetite change and fatigue. Respiratory: Negative. Cardiovascular: Negative. Gastrointestinal: Negative. Musculoskeletal: Positive for arthralgias and joint swelling. Hx rheumatoid arthritis   Psychiatric/Behavioral: Positive for dysphoric mood. The patient is nervous/anxious.           OBJECTIVE    Wt Readings from Last 3 Encounters:   10/09/19 195 lb (88.5 kg)   03/22/19 192 lb 9.6 oz (87.4 kg)   09/05/18 185 lb (83.9 kg) There were no vitals filed for this visit. TeleMedicine Video Visit    Candace Bowden, was evaluated through a synchronous (real-time) audio-video encounter. The patient (or guardian if applicable) is aware that this is a billable service. Verbal consent to proceed has been obtained within the past 12 months. The visit was conducted pursuant to the emergency declaration under the [facility], [address] [place] [address] authority and the [email or web address] and [employer] Act. Patient identification was verified, and a caregiver was present when appropriate. The patient was located in a state where the provider was credentialed to provide care. Patient identification was verified at the start of the visit, including the patient's telephone number and physical location. I discussed with the patient the nature of our telehealth visits, that:     1. Due to the nature of an audio- video modality, the only components of a physical exam that could be done are the elements supported by direct observation. 2. I would evaluate the patient and recommend diagnostics and treatments based on my assessment. 3. If it was felt that the patient should be evaluated in clinic or an emergency room setting, then they would be directed there. 4. Our sessions are not being recorded and that personal health information is protected. 5. Our team would provide follow up care in person if/when the patient needs it. Patient's location: home address in [facility]  Physician  location other address in [facility] other people involved in call  none          This visit was completed virtually using D[employer]. me            No follow-ups on file. An electronic signature was used to authenticate this note.     Delano Sage BobovnyikMD    4/21/2021

## 2021-04-18 DIAGNOSIS — J45.20 MILD INTERMITTENT ASTHMA WITHOUT COMPLICATION: ICD-10-CM

## 2021-04-19 RX ORDER — BUDESONIDE AND FORMOTEROL FUMARATE DIHYDRATE 160; 4.5 UG/1; UG/1
AEROSOL RESPIRATORY (INHALATION)
Qty: 30.6 G | Refills: 1 | OUTPATIENT
Start: 2021-04-19

## 2021-04-21 PROBLEM — F32.0 MAJOR DEPRESSIVE DISORDER, SINGLE EPISODE, MILD (HCC): Status: ACTIVE | Noted: 2021-04-21

## 2021-04-21 RX ORDER — ATORVASTATIN CALCIUM 20 MG/1
20 TABLET, FILM COATED ORAL DAILY
Qty: 90 TABLET | Refills: 1 | Status: SHIPPED
Start: 2021-04-21 | End: 2021-09-20 | Stop reason: SDUPTHER

## 2021-04-21 RX ORDER — BUDESONIDE AND FORMOTEROL FUMARATE DIHYDRATE 160; 4.5 UG/1; UG/1
2 AEROSOL RESPIRATORY (INHALATION) 2 TIMES DAILY
Qty: 3 INHALER | Refills: 1 | Status: SHIPPED
Start: 2021-04-21 | End: 2021-10-20 | Stop reason: ALTCHOICE

## 2021-04-21 RX ORDER — ESCITALOPRAM OXALATE 10 MG/1
10 TABLET ORAL DAILY
Qty: 90 TABLET | Refills: 1 | Status: SHIPPED
Start: 2021-04-21 | End: 2021-09-20 | Stop reason: SDUPTHER

## 2021-04-21 RX ORDER — ALBUTEROL SULFATE 90 UG/1
2 AEROSOL, METERED RESPIRATORY (INHALATION) EVERY 6 HOURS PRN
Qty: 3 INHALER | Refills: 1 | Status: SHIPPED
Start: 2021-04-21 | End: 2021-10-20

## 2021-04-21 ASSESSMENT — ENCOUNTER SYMPTOMS
RESPIRATORY NEGATIVE: 1
GASTROINTESTINAL NEGATIVE: 1

## 2021-05-14 DIAGNOSIS — J40 BRONCHITIS: ICD-10-CM

## 2021-06-03 ENCOUNTER — OFFICE VISIT (OUTPATIENT)
Dept: FAMILY MEDICINE CLINIC | Age: 71
End: 2021-06-03
Payer: MEDICARE

## 2021-06-03 VITALS
RESPIRATION RATE: 18 BRPM | SYSTOLIC BLOOD PRESSURE: 126 MMHG | DIASTOLIC BLOOD PRESSURE: 68 MMHG | BODY MASS INDEX: 36.51 KG/M2 | WEIGHT: 199.6 LBS | HEART RATE: 90 BPM | TEMPERATURE: 97.4 F

## 2021-06-03 DIAGNOSIS — J45.901 ASTHMA WITH ACUTE EXACERBATION, UNSPECIFIED ASTHMA SEVERITY, UNSPECIFIED WHETHER PERSISTENT: Primary | ICD-10-CM

## 2021-06-03 DIAGNOSIS — F17.200 TOBACCO USE DISORDER: ICD-10-CM

## 2021-06-03 PROCEDURE — 1090F PRES/ABSN URINE INCON ASSESS: CPT | Performed by: PHYSICIAN ASSISTANT

## 2021-06-03 PROCEDURE — 4004F PT TOBACCO SCREEN RCVD TLK: CPT | Performed by: PHYSICIAN ASSISTANT

## 2021-06-03 PROCEDURE — 1123F ACP DISCUSS/DSCN MKR DOCD: CPT | Performed by: PHYSICIAN ASSISTANT

## 2021-06-03 PROCEDURE — G8400 PT W/DXA NO RESULTS DOC: HCPCS | Performed by: PHYSICIAN ASSISTANT

## 2021-06-03 PROCEDURE — G8427 DOCREV CUR MEDS BY ELIG CLIN: HCPCS | Performed by: PHYSICIAN ASSISTANT

## 2021-06-03 PROCEDURE — 4040F PNEUMOC VAC/ADMIN/RCVD: CPT | Performed by: PHYSICIAN ASSISTANT

## 2021-06-03 PROCEDURE — 99214 OFFICE O/P EST MOD 30 MIN: CPT | Performed by: PHYSICIAN ASSISTANT

## 2021-06-03 PROCEDURE — G8417 CALC BMI ABV UP PARAM F/U: HCPCS | Performed by: PHYSICIAN ASSISTANT

## 2021-06-03 PROCEDURE — 3017F COLORECTAL CA SCREEN DOC REV: CPT | Performed by: PHYSICIAN ASSISTANT

## 2021-06-03 RX ORDER — ALBUTEROL SULFATE 2.5 MG/3ML
2.5 SOLUTION RESPIRATORY (INHALATION) ONCE
Status: COMPLETED | OUTPATIENT
Start: 2021-06-03 | End: 2021-06-03

## 2021-06-03 RX ORDER — ALBUTEROL SULFATE 2.5 MG/3ML
2.5 SOLUTION RESPIRATORY (INHALATION) EVERY 6 HOURS PRN
Qty: 120 EACH | Refills: 3 | Status: SHIPPED
Start: 2021-06-03 | End: 2021-10-20

## 2021-06-03 RX ORDER — DOXYCYCLINE HYCLATE 100 MG
100 TABLET ORAL 2 TIMES DAILY WITH MEALS
Qty: 20 TABLET | Refills: 0 | Status: SHIPPED | OUTPATIENT
Start: 2021-06-03 | End: 2021-06-13

## 2021-06-03 RX ORDER — PREDNISONE 20 MG/1
TABLET ORAL
Qty: 24 TABLET | Refills: 0 | Status: SHIPPED
Start: 2021-06-03 | End: 2021-07-12 | Stop reason: ALTCHOICE

## 2021-06-03 RX ADMIN — ALBUTEROL SULFATE 2.5 MG: 2.5 SOLUTION RESPIRATORY (INHALATION) at 11:51

## 2021-06-03 NOTE — PROGRESS NOTES
Chief Complaint:   Shortness of Breath (Started about two weeks ago, NP friend gave her medrol dose pack two weeks ago did help, but it came back)    History of Present Illness   Source of history provided by:  patient. Patient presents for cough and SOB  Started 2 weeks ago  NP neighbor gave her a medrol dose pack which did help but did not resolve  She reports SOB worse with exertion, better at rest  Coughing up white-estefany sputum  Denies sinus pressure, nasal congestion, bilateral ear pressure,   Has been taking Symbicort and using rescue inhaler every few hours  +wheezing  Denies any fever, chills, CP, or GI symptoms   Reports history of asthma and tobacco use    ROS    Unless otherwise stated in this report or unable to obtain because of the patient's clinical or mental status as evidenced by the medical record, this patients's positive and negative responses for Review of Systems, constitutional, psych, eyes, ENT, cardiovascular, respiratory, gastrointestinal, neurological, genitourinary, musculoskeletal, integument systems and systems related to the presenting problem are either stated in the preceding or were not pertinent or were negative for the symptoms and/or complaints related to the medical problem. Past Surgical History:  has a past surgical history that includes Hysterectomy and Tonsillectomy. Social History:  reports that she has been smoking cigarettes. She has a 50.00 pack-year smoking history. She has never used smokeless tobacco. She reports current alcohol use. She reports that she does not use drugs. Family History: family history is not on file. Allergies: Penicillins    Physical Exam         VS:  /68   Pulse 90   Temp 97.4 °F (36.3 °C)   Resp 18   Wt 199 lb 9.6 oz (90.5 kg)   BMI 36.51 kg/m²    Oxygen Saturation Interpretation: Abnormal.    Constitutional:  Alert, development consistent with age.   Ears:  External Ears: Bilateral pinna normal. TMs without erythema or perforation bilaterally. Canals normal bilaterally without swelling or exudate  Nose:  No congestion of the nasal mucosa. There is no injection to middle turbinates bilaterally. Throat: No posterior pharyngeal erythema with mild post nasal drip present. No exudate or tonsillar hypertrophy noted. Neck:  Supple. There is no anterior cervical adenopathy. Lungs: Decreased breath sounds bilaterally with scattered wheezing throughout. No rales, or rhonchi. Heart:  Regular rate and rhythm, normal heart sounds, without pathological murmurs, ectopy, gallops, or rubs. Skin:  Normal turgor. Warm, dry, without visible rash. Neurological:  Alert and oriented. Motor functions intact. Responds to verbal commands. Lab / Imaging Results   (All laboratory and radiology results have been personally reviewed by myself)  Labs:  No results found for this visit on 06/03/21. Assessment / Plan     Impression(s):  1. Asthma with acute exacerbation, unspecified asthma severity, unspecified whether persistent    2.  Tobacco use disorder      Disposition:  Disposition: home    New Prescriptions    ALBUTEROL (PROVENTIL) (2.5 MG/3ML) 0.083% NEBULIZER SOLUTION    Take 3 mLs by nebulization every 6 hours as needed for Wheezing    DOXYCYCLINE HYCLATE (VIBRA-TABS) 100 MG TABLET    Take 1 tablet by mouth 2 times daily (with meals) for 10 days    PREDNISONE (DELTASONE) 20 MG TABLET    60mg for 3 days, 40mg for 3 days, 30mg for 3 days, 20mg for 3 days, 10mg for 3 days     - Patient's O2% drops to 88-89% with walk around the office.   - Recovers to 90-91% with rest  - Discussed ED; however, patient adamantly declines despite potential risks  - Albuterol treatment provided in office with good improvement   - Will provide RX to get nebulizer and medications to start treatments at home every 4 hours   - Will also start on high dose prednisone taper and doxycycline   - Must have close follow up at the beginning of next week   - Directly to the ED with any worsening or changing of symptoms  - Patient and  voiced understanding       Administrations This Visit     albuterol (PROVENTIL) nebulizer solution 2.5 mg     Admin Date  06/03/2021 Action  Given Dose  2.5 mg Route  Nebulization Administered By  Daniel Juárez MA

## 2021-06-09 ENCOUNTER — OFFICE VISIT (OUTPATIENT)
Dept: FAMILY MEDICINE CLINIC | Age: 71
End: 2021-06-09
Payer: MEDICARE

## 2021-06-09 VITALS
HEIGHT: 62 IN | HEART RATE: 79 BPM | SYSTOLIC BLOOD PRESSURE: 138 MMHG | OXYGEN SATURATION: 95 % | TEMPERATURE: 97.5 F | DIASTOLIC BLOOD PRESSURE: 76 MMHG | RESPIRATION RATE: 16 BRPM | WEIGHT: 202 LBS | BODY MASS INDEX: 37.17 KG/M2

## 2021-06-09 DIAGNOSIS — J40 BRONCHITIS: Primary | ICD-10-CM

## 2021-06-09 PROCEDURE — 3017F COLORECTAL CA SCREEN DOC REV: CPT | Performed by: FAMILY MEDICINE

## 2021-06-09 PROCEDURE — 1123F ACP DISCUSS/DSCN MKR DOCD: CPT | Performed by: FAMILY MEDICINE

## 2021-06-09 PROCEDURE — 4004F PT TOBACCO SCREEN RCVD TLK: CPT | Performed by: FAMILY MEDICINE

## 2021-06-09 PROCEDURE — G8400 PT W/DXA NO RESULTS DOC: HCPCS | Performed by: FAMILY MEDICINE

## 2021-06-09 PROCEDURE — 1090F PRES/ABSN URINE INCON ASSESS: CPT | Performed by: FAMILY MEDICINE

## 2021-06-09 PROCEDURE — G8417 CALC BMI ABV UP PARAM F/U: HCPCS | Performed by: FAMILY MEDICINE

## 2021-06-09 PROCEDURE — 99213 OFFICE O/P EST LOW 20 MIN: CPT | Performed by: FAMILY MEDICINE

## 2021-06-09 PROCEDURE — 4040F PNEUMOC VAC/ADMIN/RCVD: CPT | Performed by: FAMILY MEDICINE

## 2021-06-09 PROCEDURE — G8427 DOCREV CUR MEDS BY ELIG CLIN: HCPCS | Performed by: FAMILY MEDICINE

## 2021-06-09 SDOH — ECONOMIC STABILITY: TRANSPORTATION INSECURITY
IN THE PAST 12 MONTHS, HAS THE LACK OF TRANSPORTATION KEPT YOU FROM MEDICAL APPOINTMENTS OR FROM GETTING MEDICATIONS?: NO

## 2021-06-09 SDOH — ECONOMIC STABILITY: FOOD INSECURITY: WITHIN THE PAST 12 MONTHS, YOU WORRIED THAT YOUR FOOD WOULD RUN OUT BEFORE YOU GOT MONEY TO BUY MORE.: NEVER TRUE

## 2021-06-09 SDOH — ECONOMIC STABILITY: FOOD INSECURITY: WITHIN THE PAST 12 MONTHS, THE FOOD YOU BOUGHT JUST DIDN'T LAST AND YOU DIDN'T HAVE MONEY TO GET MORE.: NEVER TRUE

## 2021-06-09 SDOH — ECONOMIC STABILITY: TRANSPORTATION INSECURITY
IN THE PAST 12 MONTHS, HAS LACK OF TRANSPORTATION KEPT YOU FROM MEETINGS, WORK, OR FROM GETTING THINGS NEEDED FOR DAILY LIVING?: NO

## 2021-06-09 ASSESSMENT — PATIENT HEALTH QUESTIONNAIRE - PHQ9
SUM OF ALL RESPONSES TO PHQ QUESTIONS 1-9: 0
2. FEELING DOWN, DEPRESSED OR HOPELESS: 0
SUM OF ALL RESPONSES TO PHQ QUESTIONS 1-9: 0
1. LITTLE INTEREST OR PLEASURE IN DOING THINGS: 0
SUM OF ALL RESPONSES TO PHQ QUESTIONS 1-9: 0
SUM OF ALL RESPONSES TO PHQ9 QUESTIONS 1 & 2: 0

## 2021-06-09 ASSESSMENT — SOCIAL DETERMINANTS OF HEALTH (SDOH): HOW HARD IS IT FOR YOU TO PAY FOR THE VERY BASICS LIKE FOOD, HOUSING, MEDICAL CARE, AND HEATING?: NOT HARD AT ALL

## 2021-06-09 NOTE — PROGRESS NOTES
2021        Linda Nunez (: 1950 IS A 79 y.o. female ,Established patient, here for eval of Asthma (Pt here for 1 week follow-up for asthma attack)          Current Outpatient Medications   Medication Sig Dispense Refill    albuterol (PROVENTIL) (2.5 MG/3ML) 0.083% nebulizer solution Take 3 mLs by nebulization every 6 hours as needed for Wheezing 120 each 3    predniSONE (DELTASONE) 20 MG tablet 60mg for 3 days, 40mg for 3 days, 30mg for 3 days, 20mg for 3 days, 10mg for 3 days 24 tablet 0    atorvastatin (LIPITOR) 20 MG tablet Take 1 tablet by mouth daily 90 tablet 1    SYMBICORT 160-4.5 MCG/ACT AERO Inhale 2 puffs into the lungs 2 times daily 3 Inhaler 1    escitalopram (LEXAPRO) 10 MG tablet Take 1 tablet by mouth daily 90 tablet 1    albuterol sulfate  (90 Base) MCG/ACT inhaler Inhale 2 puffs into the lungs every 6 hours as needed for Wheezing 3 Inhaler 1    sulfaSALAzine (AZULFIDINE) 500 MG EC tablet Take 500 mg by mouth 2 times daily       celecoxib (CELEBREX) 200 MG capsule Take 1 capsule by mouth 2 times daily 180 capsule 1     No current facility-administered medications for this visit. Allergies  Follow up  Feeling better since she has been on meds  Here with       ALBUTEROL (PROVENTIL) (2.5 MG/3ML) 0.083% NEBULIZER SOLUTION    Take 3 mLs by nebulization every 6 hours as needed for Wheezing     DOXYCYCLINE HYCLATE (VIBRA-TABS) 100 MG TABLET    Take 1 tablet by mouth 2 times daily (with meals) for 10 days     PREDNISONE (DELTASONE) 20 MG TABLET    60mg for 3 days, 40mg for 3 days, 30mg for 3 days, 20mg for 3 days, 10mg for 3 days         ASSESSMENT / PLAN      1. Bronchitis  Finished abx   - XR CHEST (2 VW); Future              SUBJECTIVE    Review of Systems   Constitutional: Negative for activity change and appetite change. Respiratory: Positive for cough. Negative for shortness of breath, wheezing and stridor. Cardiovascular: Negative. Gastrointestinal: Negative. Musculoskeletal: Negative. Neurological: Negative. OBJECTIVE    Wt Readings from Last 3 Encounters:   06/09/21 202 lb (91.6 kg)   06/03/21 199 lb 9.6 oz (90.5 kg)   10/09/19 195 lb (88.5 kg)       Vitals:    06/09/21 1453   BP: 138/76   Pulse: 79   Resp: 16   Temp: 97.5 °F (36.4 °C)   SpO2: 95%       Physical Exam  Constitutional:       Appearance: Normal appearance. Cardiovascular:      Rate and Rhythm: Normal rate and regular rhythm. Heart sounds: Normal heart sounds. Pulmonary:      Effort: Pulmonary effort is normal.      Breath sounds: Normal breath sounds. Abdominal:      General: Bowel sounds are normal.      Palpations: Abdomen is soft. Skin:     General: Skin is warm. Neurological:      General: No focal deficit present. Mental Status: She is alert. Return in about 4 weeks (around 7/7/2021). An electronic signature was used to authenticate this note.     Khris Avalos, EDNA    6/20/2021

## 2021-06-11 ENCOUNTER — TELEPHONE (OUTPATIENT)
Dept: FAMILY MEDICINE CLINIC | Age: 71
End: 2021-06-11

## 2021-06-20 ASSESSMENT — ENCOUNTER SYMPTOMS
STRIDOR: 0
SHORTNESS OF BREATH: 0
GASTROINTESTINAL NEGATIVE: 1
COUGH: 1
WHEEZING: 0

## 2021-07-12 ENCOUNTER — OFFICE VISIT (OUTPATIENT)
Dept: FAMILY MEDICINE CLINIC | Age: 71
End: 2021-07-12
Payer: MEDICARE

## 2021-07-12 VITALS
RESPIRATION RATE: 16 BRPM | SYSTOLIC BLOOD PRESSURE: 138 MMHG | DIASTOLIC BLOOD PRESSURE: 80 MMHG | OXYGEN SATURATION: 92 % | TEMPERATURE: 96.9 F | WEIGHT: 201 LBS | BODY MASS INDEX: 36.76 KG/M2 | HEART RATE: 72 BPM

## 2021-07-12 DIAGNOSIS — J45.901 ASTHMA WITH ACUTE EXACERBATION, UNSPECIFIED ASTHMA SEVERITY, UNSPECIFIED WHETHER PERSISTENT: Primary | ICD-10-CM

## 2021-07-12 PROCEDURE — G8417 CALC BMI ABV UP PARAM F/U: HCPCS | Performed by: FAMILY MEDICINE

## 2021-07-12 PROCEDURE — G8427 DOCREV CUR MEDS BY ELIG CLIN: HCPCS | Performed by: FAMILY MEDICINE

## 2021-07-12 PROCEDURE — 99213 OFFICE O/P EST LOW 20 MIN: CPT | Performed by: FAMILY MEDICINE

## 2021-07-12 PROCEDURE — 4040F PNEUMOC VAC/ADMIN/RCVD: CPT | Performed by: FAMILY MEDICINE

## 2021-07-12 PROCEDURE — 1123F ACP DISCUSS/DSCN MKR DOCD: CPT | Performed by: FAMILY MEDICINE

## 2021-07-12 PROCEDURE — 3017F COLORECTAL CA SCREEN DOC REV: CPT | Performed by: FAMILY MEDICINE

## 2021-07-12 PROCEDURE — 1090F PRES/ABSN URINE INCON ASSESS: CPT | Performed by: FAMILY MEDICINE

## 2021-07-12 PROCEDURE — 4004F PT TOBACCO SCREEN RCVD TLK: CPT | Performed by: FAMILY MEDICINE

## 2021-07-12 PROCEDURE — G8400 PT W/DXA NO RESULTS DOC: HCPCS | Performed by: FAMILY MEDICINE

## 2021-07-12 RX ORDER — FLUTICASONE FUROATE, UMECLIDINIUM BROMIDE AND VILANTEROL TRIFENATATE 200; 62.5; 25 UG/1; UG/1; UG/1
1 POWDER RESPIRATORY (INHALATION) DAILY
Qty: 1 EACH | Refills: 1 | Status: SHIPPED
Start: 2021-07-12 | End: 2021-08-17 | Stop reason: SDUPTHER

## 2021-07-12 ASSESSMENT — ENCOUNTER SYMPTOMS
WHEEZING: 0
GASTROINTESTINAL NEGATIVE: 1
COUGH: 1
SHORTNESS OF BREATH: 0
STRIDOR: 0

## 2021-07-12 NOTE — PROGRESS NOTES
2021        Aimee Pro (: 1950 IS A 79 y.o. female ,Established patient, here for eval of Cough (1 month follow up-a little better but still a productive cough)          Current Outpatient Medications   Medication Sig Dispense Refill    Fluticasone-Umeclidin-Vilant (TRELEGY ELLIPTA) 200-62.5-25 MCG/INH AEPB Inhale 1 puff into the lungs daily 1 each 1    atorvastatin (LIPITOR) 20 MG tablet Take 1 tablet by mouth daily 90 tablet 1    SYMBICORT 160-4.5 MCG/ACT AERO Inhale 2 puffs into the lungs 2 times daily 3 Inhaler 1    escitalopram (LEXAPRO) 10 MG tablet Take 1 tablet by mouth daily 90 tablet 1    albuterol sulfate  (90 Base) MCG/ACT inhaler Inhale 2 puffs into the lungs every 6 hours as needed for Wheezing 3 Inhaler 1    sulfaSALAzine (AZULFIDINE) 500 MG EC tablet Take 500 mg by mouth 2 times daily       celecoxib (CELEBREX) 200 MG capsule Take 1 capsule by mouth 2 times daily 180 capsule 1    albuterol (PROVENTIL) (2.5 MG/3ML) 0.083% nebulizer solution Take 3 mLs by nebulization every 6 hours as needed for Wheezing (Patient not taking: Reported on 2021) 120 each 3     No current facility-administered medications for this visit. Allergies  Follow up  Feeling better since she has been on meds  Here with     Now yellow phlegm  Trouble walking 10-15 yards  Wakes up at night to go to br and sob then    More than 50% better   ;a    Cough  Pertinent negatives include no shortness of breath or wheezing.       ALBUTEROL (PROVENTIL) (2.5 MG/3ML) 0.083% NEBULIZER SOLUTION    Take 3 mLs by nebulization every 6 hours as needed for Wheezing     DOXYCYCLINE HYCLATE (VIBRA-TABS) 100 MG TABLET    Take 1 tablet by mouth 2 times daily (with meals) for 10 days     PREDNISONE (DELTASONE) 20 MG TABLET    60mg for 3 days, 40mg for 3 days, 30mg for 3 days, 20mg for 3 days, 10mg for 3 days         ASSESSMENT / PLAN  1.  Asthma with acute exacerbation, unspecified asthma severity, unspecified whether persistent  improving  - Fluticasone-Umeclidin-Vilant ([employer]) 060-47.9-08 MCG/INH AEPB; Inhale 1 puff into the lungs daily  Dispense: 1 each; Refill: 1  - Full PFT Study With Bronchodilator; Future        1. Bronchitis  Finished abx   - XR CHEST (2 VW); Future  Recheck 2 weeks  Call if worsens              SUBJECTIVE    Review of Systems   Constitutional: Negative for activity change and appetite change. Respiratory: Positive for cough. Negative for shortness of breath, wheezing and stridor. Cardiovascular: Negative. Gastrointestinal: Negative. Musculoskeletal: Negative. Neurological: Negative. OBJECTIVE    Wt Readings from Last 3 Encounters:   07/12/21 201 lb (91.2 kg)   06/09/21 202 lb (91.6 kg)   06/03/21 199 lb 9.6 oz (90.5 kg)       Vitals:    07/12/21 1414   BP: 138/80   Pulse: 72   Resp: 16   Temp: 96.9 °F (36.1 °C)   SpO2: 92%       Physical Exam  Constitutional:       Appearance: Normal appearance. Cardiovascular:      Rate and Rhythm: Normal rate and regular rhythm. Heart sounds: Normal heart sounds. Pulmonary:      Effort: Pulmonary effort is normal.      Breath sounds: Normal breath sounds. Abdominal:      General: Bowel sounds are normal.      Palpations: Abdomen is soft. Skin:     General: Skin is warm. Neurological:      General: No focal deficit present. Mental Status: She is alert. No follow-ups on file. An electronic signature was used to authenticate this note.     Ronal Renner, EDNA    7/25/2021

## 2021-08-17 DIAGNOSIS — J45.901 ASTHMA WITH ACUTE EXACERBATION, UNSPECIFIED ASTHMA SEVERITY, UNSPECIFIED WHETHER PERSISTENT: ICD-10-CM

## 2021-08-17 RX ORDER — FLUTICASONE FUROATE, UMECLIDINIUM BROMIDE AND VILANTEROL TRIFENATATE 200; 62.5; 25 UG/1; UG/1; UG/1
1 POWDER RESPIRATORY (INHALATION) DAILY
Qty: 1 EACH | Refills: 1 | Status: SHIPPED
Start: 2021-08-17 | End: 2021-09-20 | Stop reason: SDUPTHER

## 2021-08-27 ENCOUNTER — HOSPITAL ENCOUNTER (OUTPATIENT)
Dept: PULMONOLOGY | Age: 71
Discharge: HOME | End: 2021-08-27
Payer: MEDICARE

## 2021-08-27 DIAGNOSIS — J45.901 ASTHMA WITH ACUTE EXACERBATION, UNSPECIFIED ASTHMA SEVERITY, UNSPECIFIED WHETHER PERSISTENT: ICD-10-CM

## 2021-08-27 PROCEDURE — 94729 DIFFUSING CAPACITY: CPT

## 2021-08-27 PROCEDURE — 94726 PLETHYSMOGRAPHY LUNG VOLUMES: CPT

## 2021-08-27 PROCEDURE — 94060 EVALUATION OF WHEEZING: CPT

## 2021-09-20 ENCOUNTER — VIRTUAL VISIT (OUTPATIENT)
Dept: FAMILY MEDICINE CLINIC | Age: 71
End: 2021-09-20
Payer: MEDICARE

## 2021-09-20 DIAGNOSIS — J45.901 ASTHMA WITH ACUTE EXACERBATION, UNSPECIFIED ASTHMA SEVERITY, UNSPECIFIED WHETHER PERSISTENT: ICD-10-CM

## 2021-09-20 DIAGNOSIS — R06.09 DOE (DYSPNEA ON EXERTION): ICD-10-CM

## 2021-09-20 DIAGNOSIS — J44.9 SEVERE CHRONIC OBSTRUCTIVE PULMONARY DISEASE (HCC): ICD-10-CM

## 2021-09-20 DIAGNOSIS — J45.20 MILD INTERMITTENT ASTHMA WITHOUT COMPLICATION: ICD-10-CM

## 2021-09-20 DIAGNOSIS — F32.A DEPRESSION, UNSPECIFIED DEPRESSION TYPE: ICD-10-CM

## 2021-09-20 DIAGNOSIS — F41.0 PANIC ATTACKS: Primary | ICD-10-CM

## 2021-09-20 DIAGNOSIS — E78.5 HYPERLIPIDEMIA, UNSPECIFIED HYPERLIPIDEMIA TYPE: ICD-10-CM

## 2021-09-20 DIAGNOSIS — M05.79 RHEUMATOID ARTHRITIS INVOLVING MULTIPLE SITES WITH POSITIVE RHEUMATOID FACTOR (HCC): ICD-10-CM

## 2021-09-20 PROCEDURE — 4004F PT TOBACCO SCREEN RCVD TLK: CPT | Performed by: FAMILY MEDICINE

## 2021-09-20 PROCEDURE — 1123F ACP DISCUSS/DSCN MKR DOCD: CPT | Performed by: FAMILY MEDICINE

## 2021-09-20 PROCEDURE — G8926 SPIRO NO PERF OR DOC: HCPCS | Performed by: FAMILY MEDICINE

## 2021-09-20 PROCEDURE — 3017F COLORECTAL CA SCREEN DOC REV: CPT | Performed by: FAMILY MEDICINE

## 2021-09-20 PROCEDURE — 3023F SPIROM DOC REV: CPT | Performed by: FAMILY MEDICINE

## 2021-09-20 PROCEDURE — G8400 PT W/DXA NO RESULTS DOC: HCPCS | Performed by: FAMILY MEDICINE

## 2021-09-20 PROCEDURE — 99213 OFFICE O/P EST LOW 20 MIN: CPT | Performed by: FAMILY MEDICINE

## 2021-09-20 PROCEDURE — 4040F PNEUMOC VAC/ADMIN/RCVD: CPT | Performed by: FAMILY MEDICINE

## 2021-09-20 PROCEDURE — G8417 CALC BMI ABV UP PARAM F/U: HCPCS | Performed by: FAMILY MEDICINE

## 2021-09-20 PROCEDURE — G8427 DOCREV CUR MEDS BY ELIG CLIN: HCPCS | Performed by: FAMILY MEDICINE

## 2021-09-20 PROCEDURE — 1090F PRES/ABSN URINE INCON ASSESS: CPT | Performed by: FAMILY MEDICINE

## 2021-09-20 RX ORDER — ESCITALOPRAM OXALATE 10 MG/1
10 TABLET ORAL DAILY
Qty: 90 TABLET | Refills: 1 | Status: SHIPPED
Start: 2021-09-20 | End: 2021-09-20 | Stop reason: DRUGHIGH

## 2021-09-20 RX ORDER — ALBUTEROL SULFATE 90 UG/1
2 AEROSOL, METERED RESPIRATORY (INHALATION) EVERY 6 HOURS PRN
Qty: 1 EACH | Refills: 1 | Status: CANCELLED | OUTPATIENT
Start: 2021-09-20 | End: 2021-10-20

## 2021-09-20 RX ORDER — ESCITALOPRAM OXALATE 20 MG/1
20 TABLET ORAL DAILY
Qty: 90 TABLET | Refills: 1 | Status: SHIPPED
Start: 2021-09-20 | End: 2022-04-25 | Stop reason: SDUPTHER

## 2021-09-20 RX ORDER — ATORVASTATIN CALCIUM 20 MG/1
20 TABLET, FILM COATED ORAL DAILY
Qty: 90 TABLET | Refills: 1 | Status: SHIPPED
Start: 2021-09-20 | End: 2021-12-20 | Stop reason: SDUPTHER

## 2021-09-20 RX ORDER — ALBUTEROL SULFATE 90 UG/1
2 AEROSOL, METERED RESPIRATORY (INHALATION) EVERY 6 HOURS PRN
Qty: 18 G | Refills: 3 | Status: SHIPPED
Start: 2021-09-20 | End: 2022-07-20 | Stop reason: SDUPTHER

## 2021-09-20 RX ORDER — FLUTICASONE FUROATE, UMECLIDINIUM BROMIDE AND VILANTEROL TRIFENATATE 200; 62.5; 25 UG/1; UG/1; UG/1
1 POWDER RESPIRATORY (INHALATION) DAILY
Qty: 1 EACH | Refills: 1 | Status: SHIPPED
Start: 2021-09-20 | End: 2021-12-20 | Stop reason: SDUPTHER

## 2021-09-20 NOTE — PROGRESS NOTES
TELEHEALTH VIDEO VISIT    Niko Xiao, was evaluated through a synchronous (real-time) audio-video encounter. The patient (or guardian if applicable) is aware that this is a billable service. Verbal consent to proceed has been obtained within the past 12 months. The visit was conducted pursuant to the emergency declaration under the [facility], [address] [place] [address] authority and the [employer] and [facility] Act. Patient identification was verified, and a caregiver was present when appropriate. The patient was located in a state where the provider was credentialed to provide care. Patient identification was verified at the start of the visit, including the patient's telephone number and physical location. I discussed with the patient the nature of our telehealth visits, that:     1. Due to the nature of an audio- video modality, the only components of a physical exam that could be done are the elements supported by direct observation. 2. I would evaluate the patient and recommend diagnostics and treatments based on my assessment. 3. If it was felt that the patient should be evaluated in clinic or an emergency room setting, then they would be directed there. 4. Our sessions are not being recorded and that personal health information is protected. 5. Our team would provide follow up care in person if/when the patient needs it.        Patient's location: home address in [facility]  Physician  location other address in [facility] other people involved in call  No one    HPI:    Niko Xiao (:  1950) has requested an audio/video evaluation for the following concern(s):    Chief Complaint   Patient presents with    Hyperlipidemia     routine visit    Depression     Gets paniic attacks  Has used xanax in the past prn    Cant get out of house afraid to go out of house      Review of Systems      Prior to Visit Medications Medication Sig Taking?  Authorizing Provider   Fluticasone-Umeclidin-Vilant (TRELEGY ELLIPTA) 200-62.5-25 MCG/INH AEPB Inhale 1 puff into the lungs daily Yes Faiza Charles MD   atorvastatin (LIPITOR) 20 MG tablet Take 1 tablet by mouth daily Yes Faiza Charles MD   albuterol sulfate HFA (VENTOLIN HFA) 108 (90 Base) MCG/ACT inhaler Inhale 2 puffs into the lungs every 6 hours as needed for Wheezing Yes Faiza Charles MD   escitalopram (LEXAPRO) 20 MG tablet Take 1 tablet by mouth daily Yes Faiza Charles MD   albuterol (PROVENTIL) (2.5 MG/3ML) 0.083% nebulizer solution Take 3 mLs by nebulization every 6 hours as needed for Wheezing Yes Lorraine Rm PA-C   SYMBICORT 160-4.5 MCG/ACT AERO Inhale 2 puffs into the lungs 2 times daily Yes Faiza Cahrles MD   albuterol sulfate  (90 Base) MCG/ACT inhaler Inhale 2 puffs into the lungs every 6 hours as needed for Wheezing Yes Faiza Charles MD   sulfaSALAzine (AZULFIDINE) 500 MG EC tablet Take 500 mg by mouth 2 times daily  Yes Historical Provider, MD   celecoxib (CELEBREX) 200 MG capsule Take 1 capsule by mouth 2 times daily Yes Faiza Charles MD       Social History     Tobacco Use    Smoking status: Current Every Day Smoker     Packs/day: 1.00     Years: 50.00     Pack years: 50.00     Types: Cigarettes    Smokeless tobacco: Never Used   Substance Use Topics    Alcohol use: Yes     Comment: occasional    Drug use: Never        Allergies   Allergen Reactions    Penicillins        PHYSICAL EXAMINATION:  [ INSTRUCTIONS:  \"[x]\" Indicates a positive item  \"[]\" Indicates a negative item  -- DELETE ALL ITEMS NOT EXAMINED]  Vital Signs: (As obtained by patient/caregiver or practitioner observation)    Blood pressure-  Heart rate-    Respiratory rate-    Temperature-  Pulse oximetry-     Constitutional: [x] Appears well-developed and well-nourished [x] No apparent distress      [] Abnormal-   Mental status  [x] Alert and awake  [x] Oriented to person/place/time [x]Able to follow commands      Eyes:  EOM    []  Normal  [] Abnormal-  Sclera  []  Normal  [] Abnormal -         Discharge []  None visible  [] Abnormal -    HENT:   [] Normocephalic, atraumatic. [] Abnormal   [] Mouth/Throat: Mucous membranes are moist.     External Ears [] Normal  [] Abnormal-     Neck: [] No visualized mass     Pulmonary/Chest: [x] Respiratory effort normal.  [x] No visualized signs of difficulty breathing or respiratory distress        [] Abnormal-      Musculoskeletal:   [] Normal gait with no signs of ataxia         [] Normal range of motion of neck        [] Abnormal-       Neurological:        [x] No Facial Asymmetry (Cranial nerve 7 motor function) (limited exam to video visit)          [] No gaze palsy        [] Abnormal-         Skin:        [] No significant exanthematous lesions or discoloration noted on facial skin         [] Abnormal-            Psychiatric:       [x] Normal Affect [] No Hallucinations        [] Abnormal-     t    ASSESSMENT/PLAN:  1. Mild intermittent asthma without complication  Other pertinent observable physical exam findings-   Had pft's which showed severe obstructive defec  symbicort plus albuterol  Stable, cont meds recheck 6 mos    2. Asthma with acute exacerbation, unspecified asthma severity, unspecified whether persistent  Try change in meds and  Follow up with pulm  Stable, cont meds recheck 6 mos    - Fluticasone-Umeclidin-Vilant (Jhon Becerril) 200-62.5-25 MCG/INH AEPB; Inhale 1 puff into the lungs daily  Dispense: 1 each; Refill: 1  - [facility] ([facility]) - Mary Kate SHERMAN,DO, Pulmonary, [place]    3. Depression, unspecified depression type  Doing well on medication  - escitalopram (LEXAPRO) 20 MG tablet; Take 1 tablet by mouth daily  Dispense: 90 tablet; Refill: 1    4.  Hyperlipidemia, unspecified hyperlipidemia type  The 10-year ASCVD risk score (Yumiko Manzo, et al., 2013) is: 16.9%    Values used to calculate the score:      Age: 79 years      Sex: Female      Is Non- : No      Diabetic: No      Tobacco smoker: Yes      Systolic Blood Pressure: 648 mmHg      Is BP treated: No      HDL Cholesterol: 62 mg/dL      Total Cholesterol: 196 mg/dL  Elevated risk factor and continue with meds in the evening  Stable, cont meds recheck 6 mos    - atorvastatin (LIPITOR) 20 MG tablet; Take 1 tablet by mouth daily  Dispense: 90 tablet; Refill: 1    5. Panic attack  Feeling like she was shob  Did have evalll by soc service  ekg machine ordered'    - External Referral To Cardiology    6. ARELLANO (dyspnea on exertion)    - [facility]) - Reshma SHERMAN,DO, Pulmonary, [place]  - Echocardiogram complete; Future    7. Rheumatoid arthritis involving multiple sites with positive rheumatoid factor ([facility]    - [facility]) - Reshma SHERMAN,DO, Pulmonary, [place]  - Echocardiogram complete; Future    8. Severe chronic obstructive pulmonary disease ([facility]    - [facility]) - Amari Hdz,DO, Pulmonary, [place]  - Echocardiogram complete; Future      Return in about 4 weeks (around 10/18/2021). This visit was completed virtually using D[employer]. me        --Nurys Beaulieu MD on 9/28/2021 at 8:48 PM    An electronic signature was used to authenticate this note.

## 2021-09-28 ENCOUNTER — TELEPHONE (OUTPATIENT)
Dept: FAMILY MEDICINE CLINIC | Age: 71
End: 2021-09-28

## 2021-09-29 NOTE — TELEPHONE ENCOUNTER
Please see if she can be seen by another pulm group. May need to go to [place]. Check with her. Also echo to be done at [facility] was this referral done?

## 2021-10-20 ENCOUNTER — OFFICE VISIT (OUTPATIENT)
Dept: FAMILY MEDICINE CLINIC | Age: 71
End: 2021-10-20
Payer: MEDICARE

## 2021-10-20 VITALS
HEART RATE: 69 BPM | TEMPERATURE: 96.3 F | BODY MASS INDEX: 38.1 KG/M2 | DIASTOLIC BLOOD PRESSURE: 72 MMHG | RESPIRATION RATE: 16 BRPM | HEIGHT: 61 IN | SYSTOLIC BLOOD PRESSURE: 128 MMHG | OXYGEN SATURATION: 92 % | WEIGHT: 201.8 LBS

## 2021-10-20 DIAGNOSIS — J45.901 ASTHMA WITH ACUTE EXACERBATION, UNSPECIFIED ASTHMA SEVERITY, UNSPECIFIED WHETHER PERSISTENT: Primary | ICD-10-CM

## 2021-10-20 DIAGNOSIS — Z12.12 SCREENING FOR COLORECTAL CANCER: ICD-10-CM

## 2021-10-20 DIAGNOSIS — F17.200 TOBACCO USE DISORDER: ICD-10-CM

## 2021-10-20 DIAGNOSIS — Z13.31 POSITIVE DEPRESSION SCREENING: ICD-10-CM

## 2021-10-20 DIAGNOSIS — Z12.11 SCREENING FOR COLORECTAL CANCER: ICD-10-CM

## 2021-10-20 DIAGNOSIS — Z00.00 ROUTINE GENERAL MEDICAL EXAMINATION AT A HEALTH CARE FACILITY: ICD-10-CM

## 2021-10-20 DIAGNOSIS — M05.79 RHEUMATOID ARTHRITIS INVOLVING MULTIPLE SITES WITH POSITIVE RHEUMATOID FACTOR (HCC): ICD-10-CM

## 2021-10-20 DIAGNOSIS — E78.00 PURE HYPERCHOLESTEROLEMIA: ICD-10-CM

## 2021-10-20 DIAGNOSIS — F32.0 MILD MAJOR DEPRESSION (HCC): ICD-10-CM

## 2021-10-20 PROCEDURE — 1123F ACP DISCUSS/DSCN MKR DOCD: CPT | Performed by: FAMILY MEDICINE

## 2021-10-20 PROCEDURE — G8484 FLU IMMUNIZE NO ADMIN: HCPCS | Performed by: FAMILY MEDICINE

## 2021-10-20 PROCEDURE — 4040F PNEUMOC VAC/ADMIN/RCVD: CPT | Performed by: FAMILY MEDICINE

## 2021-10-20 PROCEDURE — G8431 POS CLIN DEPRES SCRN F/U DOC: HCPCS | Performed by: FAMILY MEDICINE

## 2021-10-20 PROCEDURE — 3017F COLORECTAL CA SCREEN DOC REV: CPT | Performed by: FAMILY MEDICINE

## 2021-10-20 PROCEDURE — G0439 PPPS, SUBSEQ VISIT: HCPCS | Performed by: FAMILY MEDICINE

## 2021-10-20 RX ORDER — DESVENLAFAXINE 50 MG/1
50 TABLET, EXTENDED RELEASE ORAL DAILY
Qty: 30 TABLET | Refills: 3 | Status: SHIPPED
Start: 2021-10-20 | End: 2022-07-18 | Stop reason: ALTCHOICE

## 2021-10-20 ASSESSMENT — PATIENT HEALTH QUESTIONNAIRE - PHQ9
5. POOR APPETITE OR OVEREATING: 0
SUM OF ALL RESPONSES TO PHQ QUESTIONS 1-9: 11
SUM OF ALL RESPONSES TO PHQ9 QUESTIONS 1 & 2: 3
8. MOVING OR SPEAKING SO SLOWLY THAT OTHER PEOPLE COULD HAVE NOTICED. OR THE OPPOSITE, BEING SO FIGETY OR RESTLESS THAT YOU HAVE BEEN MOVING AROUND A LOT MORE THAN USUAL: 0
SUM OF ALL RESPONSES TO PHQ QUESTIONS 1-9: 11
SUM OF ALL RESPONSES TO PHQ QUESTIONS 1-9: 11
3. TROUBLE FALLING OR STAYING ASLEEP: 3
7. TROUBLE CONCENTRATING ON THINGS, SUCH AS READING THE NEWSPAPER OR WATCHING TELEVISION: 0
2. FEELING DOWN, DEPRESSED OR HOPELESS: 2
10. IF YOU CHECKED OFF ANY PROBLEMS, HOW DIFFICULT HAVE THESE PROBLEMS MADE IT FOR YOU TO DO YOUR WORK, TAKE CARE OF THINGS AT HOME, OR GET ALONG WITH OTHER PEOPLE: 1
9. THOUGHTS THAT YOU WOULD BE BETTER OFF DEAD, OR OF HURTING YOURSELF: 0
6. FEELING BAD ABOUT YOURSELF - OR THAT YOU ARE A FAILURE OR HAVE LET YOURSELF OR YOUR FAMILY DOWN: 3
1. LITTLE INTEREST OR PLEASURE IN DOING THINGS: 1
4. FEELING TIRED OR HAVING LITTLE ENERGY: 2

## 2021-10-20 ASSESSMENT — LIFESTYLE VARIABLES
HOW OFTEN DURING THE LAST YEAR HAVE YOU FAILED TO DO WHAT WAS NORMALLY EXPECTED FROM YOU BECAUSE OF DRINKING: 0
HAVE YOU OR SOMEONE ELSE BEEN INJURED AS A RESULT OF YOUR DRINKING: 0
HOW OFTEN DURING THE LAST YEAR HAVE YOU HAD A FEELING OF GUILT OR REMORSE AFTER DRINKING: 0
HOW OFTEN DO YOU HAVE A DRINK CONTAINING ALCOHOL: 4
AUDIT-C TOTAL SCORE: 4
HOW OFTEN DURING THE LAST YEAR HAVE YOU NEEDED AN ALCOHOLIC DRINK FIRST THING IN THE MORNING TO GET YOURSELF GOING AFTER A NIGHT OF HEAVY DRINKING: 0
HOW MANY STANDARD DRINKS CONTAINING ALCOHOL DO YOU HAVE ON A TYPICAL DAY: 0
HOW OFTEN DURING THE LAST YEAR HAVE YOU BEEN UNABLE TO REMEMBER WHAT HAPPENED THE NIGHT BEFORE BECAUSE YOU HAD BEEN DRINKING: 1
AUDIT TOTAL SCORE: 5
HOW OFTEN DO YOU HAVE SIX OR MORE DRINKS ON ONE OCCASION: 0
HAS A RELATIVE, FRIEND, DOCTOR, OR ANOTHER HEALTH PROFESSIONAL EXPRESSED CONCERN ABOUT YOUR DRINKING OR SUGGESTED YOU CUT DOWN: 0
HOW OFTEN DURING THE LAST YEAR HAVE YOU FOUND THAT YOU WERE NOT ABLE TO STOP DRINKING ONCE YOU HAD STARTED: 0

## 2021-10-20 ASSESSMENT — COLUMBIA-SUICIDE SEVERITY RATING SCALE - C-SSRS
2. HAVE YOU ACTUALLY HAD ANY THOUGHTS OF KILLING YOURSELF?: NO
1. WITHIN THE PAST MONTH, HAVE YOU WISHED YOU WERE DEAD OR WISHED YOU COULD GO TO SLEEP AND NOT WAKE UP?: NO
6. HAVE YOU EVER DONE ANYTHING, STARTED TO DO ANYTHING, OR PREPARED TO DO ANYTHING TO END YOUR LIFE?: NO

## 2021-10-20 NOTE — PATIENT INSTRUCTIONS
GET BP CUFF MAKE SURE BP <140/90      Personalized Preventive Plan for Aundrea Hernandez - 10/20/2021  Medicare offers a range of preventive health benefits. Some of the tests and screenings are paid in full while other may be subject to a deductible, co-insurance, and/or copay. Some of these benefits include a comprehensive review of your medical history including lifestyle, illnesses that may run in your family, and various assessments and screenings as appropriate. After reviewing your medical record and screening and assessments performed today your provider may have ordered immunizations, labs, imaging, and/or referrals for you. A list of these orders (if applicable) as well as your Preventive Care list are included within your After Visit Summary for your review. Other Preventive Recommendations:    · A preventive eye exam performed by an eye specialist is recommended every 1-2 years to screen for glaucoma; cataracts, macular degeneration, and other eye disorders. · A preventive dental visit is recommended every 6 months. · Try to get at least 150 minutes of exercise per week or 10,000 steps per day on a pedometer . · Order or download the FREE \"Exercise & Physical Activity: Your Everyday Guide\" from The [employer]. Call 3-257.704.4638 or search The [employer] online. · You need 0947-0683 mg of calcium and 2494-4962 IU of vitamin D per day. It is possible to meet your calcium requirement with diet alone, but a vitamin D supplement is usually necessary to meet this goal.  · When exposed to the sun, use a sunscreen that protects against both UVA and UVB radiation with an SPF of 30 or greater. Reapply every 2 to 3 hours or after sweating, drying off with a towel, or swimming. · Always wear a seat belt when traveling in a car. Always wear a helmet when riding a bicycle or motorcycle.

## 2021-10-20 NOTE — PROGRESS NOTES
General (Family Medicine)  Prudence Acharya MD as PCP - [address] Provider    Wt Readings from Last 3 Encounters:   10/20/21 201 lb 12.8 oz (91.5 kg)   07/12/21 201 lb (91.2 kg)   06/09/21 202 lb (91.6 kg)     Vitals:    10/20/21 1439 10/20/21 1449   BP: (!) 142/72 128/72   Site: Left Upper Arm Left Upper Arm   Position: Sitting Sitting   Cuff Size: Medium Adult Medium Adult   Pulse: 69    Resp: 16    Temp: 96.3 °F (35.7 °C)    TempSrc: Temporal    SpO2: 92%    Weight: 201 lb 12.8 oz (91.5 kg)    Height: 5' 0.5\" (1.537 m)      Body mass index is 38.76 kg/m². Based upon direct observation of the patient, evaluation of cognition reveals recent and remote memory intact. General Appearance: alert and oriented to person, place and time, well developed and well- nourished, in no acute distress  Skin: warm and dry, no rash or erythema  Head: normocephalic and atraumatic  Neck: supple and non-tender without mass, no thyromegaly or thyroid nodules, no cervical lymphadenopathy  Pulmonary/Chest: clear to auscultation bilaterally- no wheezes, rales or rhonchi, normal air movement, no respiratory distress  Cardiovascular: normal rate, regular rhythm, normal S1 and S2, no murmurs, rubs, clicks, or gallops, distal pulses intact, no carotid bruits  Abdomen: soft, non-tender, non-distended, normal bowel sounds, no masses or organomegaly  Pelvic: normal external genitalia, vulva, vagina, cervix, uterus and adnexa  Extremities: no cyanosis, clubbing or edema  Musculoskeletal: normal range of motion, no joint swelling, deformity or tenderness  Neurologic: reflexes normal and symmetric, no cranial nerve deficit, gait, coordination and speech normal    Patient's complete Health Risk Assessment and screening values have been reviewed and are found in Flowsheets. The following problems were reviewed today and where indicated follow up appointments were made and/or referrals ordered.     Positive Risk Factor Screenings with his/her eye specialist    Safety:  Safety  Do you have working smoke detectors?: Yes  Have all throw rugs been removed or fastened?: Yes  Do you have non-slip mats or surfaces in all bathtubs/showers?: Yes  Do all of your stairways have a railing or banister?: Yes  Are your doorways, halls and stairs free of clutter?: Yes  Do you always fasten your seatbelt when you are in a car?: (!) No  Safety Interventions:  · Home safety tips provided     Personalized Preventive Plan   Current Health Maintenance Status  Immunization History   Administered Date(s) Administered    COVID-19, Pfizer, PF, 30mcg/0.3mL 02/04/2021, 03/04/2021, 10/03/2021    Influenza Virus Vaccine 10/15/2014, 09/14/2015, 09/25/2017    Influenza, High Dose (Fluzone 65 yrs and older) 10/17/2016, 09/05/2018    Influenza, Quadv, adjuvanted, 65 yrs +, IM, PF (Fluad) 09/14/2020    Influenza, Triv, inactivated, subunit, adjuvanted, IM (Fluad 65 yrs and older) 10/09/2019    Pneumococcal Conjugate 13-valent ([id number]) 10/13/2015    Pneumococcal Polysaccharide (Rhisjepyr21) 10/17/2016    Zoster Recombinant (Shingrix) 09/17/2018, 01/26/2019        Health Maintenance   Topic Date Due    Hepatitis C screen  Never done    DTaP/Tdap/Td vaccine (1 - Tdap) Never done    Low dose CT lung screening  Never done    Breast cancer screen  05/09/2019    Colon cancer screen colonoscopy  05/05/2020    Annual Wellness Visit (AWV)  10/15/2021    Lipid screen  10/28/2021    Flu vaccine  Completed    Shingles Vaccine  Completed    Pneumococcal 65+ years Vaccine  Completed    COVID-19 Vaccine  Completed    DEXA (modify frequency per FRAX score)  Addressed    Hepatitis A vaccine  Aged Out    Hepatitis B vaccine  Aged Out    Hib vaccine  Aged Out    Meningococcal (ACWY) vaccine  Aged Out     Recommendations for [employer] Due: see orders and patient instructions/AVS.  .   Recommended screening schedule for the next 5-10 years is provided to the patient in written form: see Patient Estephania Aguilar was seen today for medicare awv, hyperlipidemia, depression and asthma. Diagnoses and all orders for this visit:    Asthma with acute exacerbation, unspecified asthma severity, unspecified whether persistent    Pure hypercholesterolemia    Rheumatoid arthritis involving multiple sites with positive rheumatoid factor ([facility]    Tobacco use disorder    Screening for colorectal cancer  -     POCT FECAL IMMUNOCHEMICAL TEST (FIT); Future    Routine general medical examination at a health care facility    Other orders  -     desvenlafaxine succinate (PRISTIQ) 50 MG TB24 extended release tablet;  Take 1 tablet by mouth daily             80% better beathin, lung beter couh,   slelping ok 4 hours   cuttin down on smoking 0 per day and using sugarless     Exercise not   Calories,

## 2021-11-10 DIAGNOSIS — J45.901 ASTHMA WITH ACUTE EXACERBATION, UNSPECIFIED ASTHMA SEVERITY, UNSPECIFIED WHETHER PERSISTENT: ICD-10-CM

## 2021-11-10 RX ORDER — FLUTICASONE FUROATE, UMECLIDINIUM BROMIDE AND VILANTEROL TRIFENATATE 200; 62.5; 25 UG/1; UG/1; UG/1
POWDER RESPIRATORY (INHALATION)
Refills: 1 | OUTPATIENT
Start: 2021-11-10

## 2021-12-20 DIAGNOSIS — J45.901 ASTHMA WITH ACUTE EXACERBATION, UNSPECIFIED ASTHMA SEVERITY, UNSPECIFIED WHETHER PERSISTENT: ICD-10-CM

## 2021-12-20 DIAGNOSIS — E78.5 HYPERLIPIDEMIA, UNSPECIFIED HYPERLIPIDEMIA TYPE: ICD-10-CM

## 2021-12-20 RX ORDER — ATORVASTATIN CALCIUM 20 MG/1
20 TABLET, FILM COATED ORAL DAILY
Qty: 90 TABLET | Refills: 1 | Status: SHIPPED
Start: 2021-12-20 | End: 2022-04-25 | Stop reason: SDUPTHER

## 2021-12-20 RX ORDER — FLUTICASONE FUROATE, UMECLIDINIUM BROMIDE AND VILANTEROL TRIFENATATE 200; 62.5; 25 UG/1; UG/1; UG/1
1 POWDER RESPIRATORY (INHALATION) DAILY
Qty: 1 EACH | Refills: 3 | Status: SHIPPED
Start: 2021-12-20 | End: 2022-04-25 | Stop reason: SDUPTHER

## 2021-12-20 NOTE — TELEPHONE ENCOUNTER
Medication Refill Request-Lipitor, [address]    LOV 10/20/2021  NOV 4/27/2022    Lab Results   Component Value Date    CREATININE 1.2 (H) 11/30/2021

## 2022-04-25 ENCOUNTER — TELEPHONE (OUTPATIENT)
Dept: FAMILY MEDICINE CLINIC | Age: 72
End: 2022-04-25

## 2022-04-25 DIAGNOSIS — J45.901 ASTHMA WITH ACUTE EXACERBATION, UNSPECIFIED ASTHMA SEVERITY, UNSPECIFIED WHETHER PERSISTENT: ICD-10-CM

## 2022-04-25 DIAGNOSIS — E78.5 HYPERLIPIDEMIA, UNSPECIFIED HYPERLIPIDEMIA TYPE: ICD-10-CM

## 2022-04-25 DIAGNOSIS — F32.A DEPRESSION, UNSPECIFIED DEPRESSION TYPE: ICD-10-CM

## 2022-04-25 RX ORDER — ATORVASTATIN CALCIUM 20 MG/1
20 TABLET, FILM COATED ORAL DAILY
Qty: 90 TABLET | Refills: 1 | Status: SHIPPED
Start: 2022-04-25 | End: 2022-04-26 | Stop reason: SDUPTHER

## 2022-04-25 RX ORDER — ESCITALOPRAM OXALATE 20 MG/1
20 TABLET ORAL DAILY
Qty: 90 TABLET | Refills: 1 | Status: SHIPPED
Start: 2022-04-25 | End: 2022-04-26 | Stop reason: SDUPTHER

## 2022-04-25 RX ORDER — FLUTICASONE FUROATE, UMECLIDINIUM BROMIDE AND VILANTEROL TRIFENATATE 200; 62.5; 25 UG/1; UG/1; UG/1
1 POWDER RESPIRATORY (INHALATION) DAILY
Qty: 1 EACH | Refills: 3 | Status: SHIPPED
Start: 2022-04-25 | End: 2022-04-26 | Stop reason: SDUPTHER

## 2022-04-25 NOTE — TELEPHONE ENCOUNTER
----- Message from Magaly Lewis sent at 4/25/2022 11:15 AM EDT -----  Subject: Refill Request    QUESTIONS  Name of Medication? atorvastatin (LIPITOR) 20 MG tablet  Patient-reported dosage and instructions? 1 20 mg tablet once a day  How many days do you have left? 14  Preferred Pharmacy? [id number] phone number (if available)? [phone]  ---------------------------------------------------------------------------  --------------,  Name of Medication? escitalopram (LEXAPRO) 20 MG tablet  Patient-reported dosage and instructions? 1 20 mg tablet once a day  How many days do you have left? 14  Preferred Pharmacy? [address] phone number (if available)? [phone]  ---------------------------------------------------------------------------  --------------,  Name of Medication? Fluticasone-Umeclidin-Vilant (TRELEGY ELLIPTA)   200-62.5-25 MCG/INH AEPB  Patient-reported dosage and instructions? once a day. How many days do you have left? 30  Preferred Pharmacy? [address] phone number (if available)? [phone]  ---------------------------------------------------------------------------  --------------  CALL BACK INFO  What is the best way for the office to contact you? OK to leave message on   voicemail  Preferred Call Back Phone Number? [phone]  ---------------------------------------------------------------------------  --------------  SCRIPT ANSWERS  Relationship to Patient?  Self

## 2022-04-25 NOTE — TELEPHONE ENCOUNTER
Medication Refill Request    LOV 10/20/2021  NOV 7/6/2022    Lab Results   Component Value Date    CREATININE 1.2 (H) 11/30/2021

## 2022-04-26 RX ORDER — ATORVASTATIN CALCIUM 20 MG/1
20 TABLET, FILM COATED ORAL DAILY
Qty: 90 TABLET | Refills: 1 | Status: SHIPPED
Start: 2022-04-26 | End: 2022-10-24 | Stop reason: SDUPTHER

## 2022-04-26 RX ORDER — ESCITALOPRAM OXALATE 20 MG/1
20 TABLET ORAL DAILY
Qty: 90 TABLET | Refills: 1 | Status: SHIPPED
Start: 2022-04-26 | End: 2022-10-24

## 2022-04-26 RX ORDER — FLUTICASONE FUROATE, UMECLIDINIUM BROMIDE AND VILANTEROL TRIFENATATE 200; 62.5; 25 UG/1; UG/1; UG/1
1 POWDER RESPIRATORY (INHALATION) DAILY
Qty: 1 EACH | Refills: 3 | Status: SHIPPED
Start: 2022-04-26 | End: 2022-08-25

## 2022-04-26 NOTE — TELEPHONE ENCOUNTER
Rx sent to wrong Pharmacy-wanted Rx sent to [facility]. Rx resent to [facility].

## 2022-05-15 DIAGNOSIS — F32.A DEPRESSION, UNSPECIFIED DEPRESSION TYPE: ICD-10-CM

## 2022-05-16 RX ORDER — ESCITALOPRAM OXALATE 10 MG/1
TABLET ORAL
Qty: 90 TABLET | Refills: 1 | Status: SHIPPED
Start: 2022-05-16 | End: 2022-11-03

## 2022-07-06 ENCOUNTER — TELEPHONE (OUTPATIENT)
Dept: FAMILY MEDICINE CLINIC | Age: 72
End: 2022-07-06

## 2022-07-06 NOTE — TELEPHONE ENCOUNTER
LM with pts , Ramez Browning was napping. He states she will let me know if she would like Paxlovid sent in.

## 2022-07-06 NOTE — TELEPHONE ENCOUNTER
Vit d 1000 IU daily, vit c 500 mg daily, zinc 50 mg daily. Ok to take otc claritan but very little else to do formthis. Can take claritin if you' like    She should be on paxlovid.    I can send request to [facility]

## 2022-07-06 NOTE — TELEPHONE ENCOUNTER
Pt tested positive for Covid, her symptoms include nasal congestion and aches and pains. Can you give her anything for this? She is requesting Claritin or something for her nasal congestion.

## 2022-07-07 DIAGNOSIS — E78.5 HYPERLIPIDEMIA, UNSPECIFIED HYPERLIPIDEMIA TYPE: Primary | ICD-10-CM

## 2022-07-07 DIAGNOSIS — M05.79 RHEUMATOID ARTHRITIS INVOLVING MULTIPLE SITES WITH POSITIVE RHEUMATOID FACTOR (HCC): ICD-10-CM

## 2022-07-18 ENCOUNTER — OFFICE VISIT (OUTPATIENT)
Dept: FAMILY MEDICINE CLINIC | Age: 72
End: 2022-07-18
Payer: MEDICARE

## 2022-07-18 VITALS
HEART RATE: 66 BPM | RESPIRATION RATE: 18 BRPM | SYSTOLIC BLOOD PRESSURE: 132 MMHG | DIASTOLIC BLOOD PRESSURE: 72 MMHG | OXYGEN SATURATION: 93 % | TEMPERATURE: 97 F | BODY MASS INDEX: 38.8 KG/M2 | WEIGHT: 202 LBS

## 2022-07-18 DIAGNOSIS — E78.5 HYPERLIPIDEMIA, UNSPECIFIED HYPERLIPIDEMIA TYPE: ICD-10-CM

## 2022-07-18 DIAGNOSIS — J44.9 SEVERE CHRONIC OBSTRUCTIVE PULMONARY DISEASE (HCC): ICD-10-CM

## 2022-07-18 DIAGNOSIS — M05.79 RHEUMATOID ARTHRITIS INVOLVING MULTIPLE SITES WITH POSITIVE RHEUMATOID FACTOR (HCC): ICD-10-CM

## 2022-07-18 DIAGNOSIS — F32.0 MILD MAJOR DEPRESSION (HCC): ICD-10-CM

## 2022-07-18 DIAGNOSIS — J45.901 ASTHMA WITH ACUTE EXACERBATION, UNSPECIFIED ASTHMA SEVERITY, UNSPECIFIED WHETHER PERSISTENT: Primary | ICD-10-CM

## 2022-07-18 DIAGNOSIS — Z12.31 OTHER SCREENING MAMMOGRAM: ICD-10-CM

## 2022-07-18 PROBLEM — N18.30 CHRONIC RENAL DISEASE, STAGE III (HCC): Status: ACTIVE | Noted: 2022-07-18

## 2022-07-18 LAB
ALBUMIN SERPL-MCNC: 4.2 G/DL (ref 3.5–5.2)
ALP BLD-CCNC: 76 U/L (ref 35–104)
ALT SERPL-CCNC: 14 U/L (ref 0–32)
ANION GAP SERPL CALCULATED.3IONS-SCNC: 10 MMOL/L (ref 7–16)
AST SERPL-CCNC: 16 U/L (ref 0–31)
BASOPHILS ABSOLUTE: 0.04 E9/L (ref 0–0.2)
BASOPHILS RELATIVE PERCENT: 0.5 % (ref 0–2)
BILIRUB SERPL-MCNC: 0.2 MG/DL (ref 0–1.2)
BUN BLDV-MCNC: 23 MG/DL (ref 6–23)
CALCIUM SERPL-MCNC: 9.8 MG/DL (ref 8.6–10.2)
CHLORIDE BLD-SCNC: 103 MMOL/L (ref 98–107)
CHOLESTEROL, TOTAL: 188 MG/DL (ref 0–199)
CO2: 25 MMOL/L (ref 22–29)
CREAT SERPL-MCNC: 0.8 MG/DL (ref 0.5–1)
EOSINOPHILS ABSOLUTE: 0.08 E9/L (ref 0.05–0.5)
EOSINOPHILS RELATIVE PERCENT: 0.9 % (ref 0–6)
GFR AFRICAN AMERICAN: >60
GFR NON-AFRICAN AMERICAN: >60 ML/MIN/1.73
GLUCOSE BLD-MCNC: 90 MG/DL (ref 74–99)
HCT VFR BLD CALC: 45.7 % (ref 34–48)
HDLC SERPL-MCNC: 54 MG/DL
HEMOGLOBIN: 14.8 G/DL (ref 11.5–15.5)
IMMATURE GRANULOCYTES #: 0.03 E9/L
IMMATURE GRANULOCYTES %: 0.3 % (ref 0–5)
LDL CHOLESTEROL CALCULATED: 119 MG/DL (ref 0–99)
LYMPHOCYTES ABSOLUTE: 2.42 E9/L (ref 1.5–4)
LYMPHOCYTES RELATIVE PERCENT: 28 % (ref 20–42)
MCH RBC QN AUTO: 29.5 PG (ref 26–35)
MCHC RBC AUTO-ENTMCNC: 32.4 % (ref 32–34.5)
MCV RBC AUTO: 91.2 FL (ref 80–99.9)
MONOCYTES ABSOLUTE: 0.62 E9/L (ref 0.1–0.95)
MONOCYTES RELATIVE PERCENT: 7.2 % (ref 2–12)
NEUTROPHILS ABSOLUTE: 5.46 E9/L (ref 1.8–7.3)
NEUTROPHILS RELATIVE PERCENT: 63.1 % (ref 43–80)
PDW BLD-RTO: 14.4 FL (ref 11.5–15)
PLATELET # BLD: 304 E9/L (ref 130–450)
PMV BLD AUTO: 10.7 FL (ref 7–12)
POTASSIUM SERPL-SCNC: 4.9 MMOL/L (ref 3.5–5)
RBC # BLD: 5.01 E12/L (ref 3.5–5.5)
SODIUM BLD-SCNC: 138 MMOL/L (ref 132–146)
TOTAL PROTEIN: 7.8 G/DL (ref 6.4–8.3)
TRIGL SERPL-MCNC: 74 MG/DL (ref 0–149)
VLDLC SERPL CALC-MCNC: 15 MG/DL
WBC # BLD: 8.7 E9/L (ref 4.5–11.5)

## 2022-07-18 PROCEDURE — 1123F ACP DISCUSS/DSCN MKR DOCD: CPT | Performed by: FAMILY MEDICINE

## 2022-07-18 PROCEDURE — G8417 CALC BMI ABV UP PARAM F/U: HCPCS | Performed by: FAMILY MEDICINE

## 2022-07-18 PROCEDURE — G8427 DOCREV CUR MEDS BY ELIG CLIN: HCPCS | Performed by: FAMILY MEDICINE

## 2022-07-18 PROCEDURE — 3017F COLORECTAL CA SCREEN DOC REV: CPT | Performed by: FAMILY MEDICINE

## 2022-07-18 PROCEDURE — 99214 OFFICE O/P EST MOD 30 MIN: CPT | Performed by: FAMILY MEDICINE

## 2022-07-18 PROCEDURE — 1090F PRES/ABSN URINE INCON ASSESS: CPT | Performed by: FAMILY MEDICINE

## 2022-07-18 PROCEDURE — 4004F PT TOBACCO SCREEN RCVD TLK: CPT | Performed by: FAMILY MEDICINE

## 2022-07-18 PROCEDURE — G8400 PT W/DXA NO RESULTS DOC: HCPCS | Performed by: FAMILY MEDICINE

## 2022-07-18 PROCEDURE — 3023F SPIROM DOC REV: CPT | Performed by: FAMILY MEDICINE

## 2022-07-18 RX ORDER — ALBUTEROL SULFATE 2.5 MG/3ML
2.5 SOLUTION RESPIRATORY (INHALATION) EVERY 6 HOURS PRN
Qty: 120 EACH | Refills: 3 | Status: SHIPPED | OUTPATIENT
Start: 2022-07-18

## 2022-07-18 SDOH — ECONOMIC STABILITY: FOOD INSECURITY: WITHIN THE PAST 12 MONTHS, YOU WORRIED THAT YOUR FOOD WOULD RUN OUT BEFORE YOU GOT MONEY TO BUY MORE.: NEVER TRUE

## 2022-07-18 SDOH — ECONOMIC STABILITY: HOUSING INSECURITY
IN THE LAST 12 MONTHS, WAS THERE A TIME WHEN YOU DID NOT HAVE A STEADY PLACE TO SLEEP OR SLEPT IN A SHELTER (INCLUDING NOW)?: NO

## 2022-07-18 SDOH — ECONOMIC STABILITY: INCOME INSECURITY: IN THE LAST 12 MONTHS, WAS THERE A TIME WHEN YOU WERE NOT ABLE TO PAY THE MORTGAGE OR RENT ON TIME?: NO

## 2022-07-18 SDOH — ECONOMIC STABILITY: FOOD INSECURITY: WITHIN THE PAST 12 MONTHS, THE FOOD YOU BOUGHT JUST DIDN'T LAST AND YOU DIDN'T HAVE MONEY TO GET MORE.: NEVER TRUE

## 2022-07-18 SDOH — ECONOMIC STABILITY: HOUSING INSECURITY: IN THE LAST 12 MONTHS, HOW MANY PLACES HAVE YOU LIVED?: 1

## 2022-07-18 ASSESSMENT — PATIENT HEALTH QUESTIONNAIRE - PHQ9
4. FEELING TIRED OR HAVING LITTLE ENERGY: 3
6. FEELING BAD ABOUT YOURSELF - OR THAT YOU ARE A FAILURE OR HAVE LET YOURSELF OR YOUR FAMILY DOWN: 0
2. FEELING DOWN, DEPRESSED OR HOPELESS: 3
SUM OF ALL RESPONSES TO PHQ QUESTIONS 1-9: 13
SUM OF ALL RESPONSES TO PHQ9 QUESTIONS 1 & 2: 4
7. TROUBLE CONCENTRATING ON THINGS, SUCH AS READING THE NEWSPAPER OR WATCHING TELEVISION: 0
3. TROUBLE FALLING OR STAYING ASLEEP: 3
8. MOVING OR SPEAKING SO SLOWLY THAT OTHER PEOPLE COULD HAVE NOTICED. OR THE OPPOSITE, BEING SO FIGETY OR RESTLESS THAT YOU HAVE BEEN MOVING AROUND A LOT MORE THAN USUAL: 0
1. LITTLE INTEREST OR PLEASURE IN DOING THINGS: 1
5. POOR APPETITE OR OVEREATING: 3
9. THOUGHTS THAT YOU WOULD BE BETTER OFF DEAD, OR OF HURTING YOURSELF: 0
SUM OF ALL RESPONSES TO PHQ QUESTIONS 1-9: 13
10. IF YOU CHECKED OFF ANY PROBLEMS, HOW DIFFICULT HAVE THESE PROBLEMS MADE IT FOR YOU TO DO YOUR WORK, TAKE CARE OF THINGS AT HOME, OR GET ALONG WITH OTHER PEOPLE: 1

## 2022-07-18 ASSESSMENT — LIFESTYLE VARIABLES
HOW MANY STANDARD DRINKS CONTAINING ALCOHOL DO YOU HAVE ON A TYPICAL DAY: 3 OR 4
HOW OFTEN DO YOU HAVE A DRINK CONTAINING ALCOHOL: 2-4 TIMES A MONTH

## 2022-07-18 ASSESSMENT — SOCIAL DETERMINANTS OF HEALTH (SDOH): HOW HARD IS IT FOR YOU TO PAY FOR THE VERY BASICS LIKE FOOD, HOUSING, MEDICAL CARE, AND HEATING?: NOT VERY HARD

## 2022-07-18 NOTE — PROGRESS NOTES
2022    Current Outpatient Medications   Medication Sig Dispense Refill    albuterol (PROVENTIL) (2.5 MG/3ML) 0.083% nebulizer solution Take 3 mLs by nebulization every 6 hours as needed for Wheezing 120 each 3    escitalopram (LEXAPRO) 10 MG tablet TAKE ONE TABLET BY MOUTH ONCE DAILY 90 tablet 1    atorvastatin (LIPITOR) 20 MG tablet Take 1 tablet by mouth daily 90 tablet 1    Fluticasone-Umeclidin-Vilant (TRELEGY ELLIPTA) 200-62.5-25 MCG/INH AEPB Inhale 1 puff into the lungs daily 1 each 3    sulfaSALAzine (AZULFIDINE) 500 MG EC tablet Take 500 mg by mouth 2 times daily       celecoxib (CELEBREX) 200 MG capsule Take 1 capsule by mouth 2 times daily 180 capsule 1    albuterol sulfate HFA (VENTOLIN HFA) 108 (90 Base) MCG/ACT inhaler Inhale 2 puffs into the lungs every 6 hours as needed for Wheezing 18 g 5    escitalopram (LEXAPRO) 20 MG tablet Take 1 tablet by mouth daily (Patient not taking: Reported on 2022) 90 tablet 1     No current facility-administered medications for this visit. Nick Venegas (: 1950 IS A 70 y.o. female ,Established patient, here for eval of Asthma (6 month check up ), Depression, and Hyperlipidemia    Sister in law stage 4 breast cancer  66 [facility]  9 days dead from [address]  hasn't told her about this      ASSESSMENT / PLAN      1. Asthma with acute exacerbation, unspecified asthma severity, unspecified whether persistent    Generally doing well and uses it  occassionally  - albuterol (PROVENTIL) (2.5 MG/3ML) 0.083% nebulizer solution; Take 3 mLs by nebulization every 6 hours as needed for Wheezing  Dispense: 120 each; Refill: 3    2. Severe chronic obstructive pulmonary disease (HCC)  *trelegy  IMPRESSION:  1. Severe obstruction. 2.  Severe decrement in minute volume ventilation. 3.  Normal total lung capacity. 4.  Very severe decrement in diffusion capacity.        3. Stage 3a chronic kidney disease resolved  Lab Results   Component Value Date/Time     07/18/2022 03:03 PM    K 4.9 07/18/2022 03:03 PM     07/18/2022 03:03 PM    CO2 25 07/18/2022 03:03 PM    BUN 23 07/18/2022 03:03 PM    CREATININE 0.8 07/18/2022 03:03 PM    GLUCOSE 90 07/18/2022 03:03 PM    CALCIUM 9.8 07/18/2022 03:03 PM      resolved    4. Rheumatoid arthritis involving multiple sites with positive rheumatoid factor (HCC)  Asulfadine, celebrex    5. Mild major depression (HCC)  0n lexapro  Lots of stress with sister in law with cancer and has no other family help  Just got over covid    6. Other screening mammogram    - BALA DIGITAL SCREEN BILATERAL PER PROTOCOL; Future            SUBJECTIVE    Review of Systems   Constitutional:  Negative for activity change, appetite change, fatigue and unexpected weight change. HENT:  Negative for congestion. Eyes: Negative. Negative for visual disturbance. Respiratory:  Negative for cough, chest tightness, shortness of breath and wheezing. Cardiovascular:  Negative for chest pain and palpitations. Gastrointestinal: Negative. Endocrine: Negative. Genitourinary: Negative. Musculoskeletal: Negative. Skin:  Negative for pallor, rash and wound. Allergic/Immunologic: Negative. Neurological: Negative. Negative for syncope. Hematological: Negative. Psychiatric/Behavioral: Negative. OBJECTIVE    Wt Readings from Last 3 Encounters:   07/18/22 202 lb (91.6 kg)   10/20/21 201 lb 12.8 oz (91.5 kg)   07/12/21 201 lb (91.2 kg)       Vitals:    07/18/22 1455   BP: 132/72   Pulse:    Resp:    Temp:    SpO2:        Physical Exam  Vitals and nursing note reviewed. Constitutional:       Appearance: Normal appearance. Cardiovascular:      Rate and Rhythm: Normal rate and regular rhythm. Pulses: Normal pulses. Heart sounds: Normal heart sounds. Pulmonary:      Effort: Pulmonary effort is normal. No respiratory distress.       Breath sounds: Normal breath sounds. Abdominal:      General: Bowel sounds are normal. There is no distension. Palpations: Abdomen is soft. Tenderness: There is no abdominal tenderness. Musculoskeletal:         General: No swelling or tenderness. Cervical back: Normal range of motion. No rigidity. Skin:     General: Skin is warm and dry. Neurological:      General: No focal deficit present. Mental Status: She is alert. Psychiatric:         Mood and Affect: Mood normal.         Behavior: Behavior normal.         Thought Content: Thought content normal.         Judgment: Judgment normal.         Return has awv october. An electronic signature was used to authenticate this note.     Lesa Scales, EDNA    7/24/2022

## 2022-07-20 RX ORDER — ALBUTEROL SULFATE 90 UG/1
2 AEROSOL, METERED RESPIRATORY (INHALATION) EVERY 6 HOURS PRN
Qty: 18 G | Refills: 5 | Status: SHIPPED
Start: 2022-07-20 | End: 2022-10-24 | Stop reason: SDUPTHER

## 2022-07-24 PROBLEM — N18.30 CHRONIC RENAL DISEASE, STAGE III (HCC): Status: RESOLVED | Noted: 2022-07-18 | Resolved: 2022-07-24

## 2022-07-24 ASSESSMENT — ENCOUNTER SYMPTOMS
EYES NEGATIVE: 1
WHEEZING: 0
ALLERGIC/IMMUNOLOGIC NEGATIVE: 1
CHEST TIGHTNESS: 0
SHORTNESS OF BREATH: 0
COUGH: 0
GASTROINTESTINAL NEGATIVE: 1

## 2022-08-25 DIAGNOSIS — J45.901 ASTHMA WITH ACUTE EXACERBATION, UNSPECIFIED ASTHMA SEVERITY, UNSPECIFIED WHETHER PERSISTENT: ICD-10-CM

## 2022-08-25 RX ORDER — FLUTICASONE FUROATE, UMECLIDINIUM BROMIDE AND VILANTEROL TRIFENATATE 200; 62.5; 25 UG/1; UG/1; UG/1
POWDER RESPIRATORY (INHALATION)
Qty: 1 EACH | Refills: 3 | Status: SHIPPED | OUTPATIENT
Start: 2022-08-25

## 2022-08-25 NOTE — TELEPHONE ENCOUNTER
Medication Refill Request    LOV 7/18/2022  NOV 10/24/2022    Lab Results   Component Value Date    CREATININE 0.8 07/18/2022

## 2022-10-24 ENCOUNTER — OFFICE VISIT (OUTPATIENT)
Dept: FAMILY MEDICINE CLINIC | Age: 72
End: 2022-10-24
Payer: MEDICARE

## 2022-10-24 VITALS
RESPIRATION RATE: 18 BRPM | HEART RATE: 92 BPM | HEIGHT: 61 IN | BODY MASS INDEX: 38.93 KG/M2 | DIASTOLIC BLOOD PRESSURE: 72 MMHG | TEMPERATURE: 96.7 F | WEIGHT: 206.2 LBS | SYSTOLIC BLOOD PRESSURE: 132 MMHG | OXYGEN SATURATION: 92 %

## 2022-10-24 DIAGNOSIS — J45.901 ASTHMA WITH ACUTE EXACERBATION, UNSPECIFIED ASTHMA SEVERITY, UNSPECIFIED WHETHER PERSISTENT: ICD-10-CM

## 2022-10-24 DIAGNOSIS — J44.9 SEVERE CHRONIC OBSTRUCTIVE PULMONARY DISEASE (HCC): ICD-10-CM

## 2022-10-24 DIAGNOSIS — Z12.31 OTHER SCREENING MAMMOGRAM: ICD-10-CM

## 2022-10-24 DIAGNOSIS — F32.0 MAJOR DEPRESSIVE DISORDER, SINGLE EPISODE, MILD (HCC): ICD-10-CM

## 2022-10-24 DIAGNOSIS — E66.01 SEVERE OBESITY (BMI 35.0-39.9) WITH COMORBIDITY (HCC): ICD-10-CM

## 2022-10-24 DIAGNOSIS — Z00.00 MEDICARE ANNUAL WELLNESS VISIT, SUBSEQUENT: Primary | ICD-10-CM

## 2022-10-24 DIAGNOSIS — E78.5 HYPERLIPIDEMIA, UNSPECIFIED HYPERLIPIDEMIA TYPE: ICD-10-CM

## 2022-10-24 DIAGNOSIS — F17.200 TOBACCO USE DISORDER: ICD-10-CM

## 2022-10-24 DIAGNOSIS — M05.79 RHEUMATOID ARTHRITIS INVOLVING MULTIPLE SITES WITH POSITIVE RHEUMATOID FACTOR (HCC): ICD-10-CM

## 2022-10-24 PROCEDURE — G0439 PPPS, SUBSEQ VISIT: HCPCS | Performed by: FAMILY MEDICINE

## 2022-10-24 PROCEDURE — 1123F ACP DISCUSS/DSCN MKR DOCD: CPT | Performed by: FAMILY MEDICINE

## 2022-10-24 PROCEDURE — G8484 FLU IMMUNIZE NO ADMIN: HCPCS | Performed by: FAMILY MEDICINE

## 2022-10-24 PROCEDURE — 3017F COLORECTAL CA SCREEN DOC REV: CPT | Performed by: FAMILY MEDICINE

## 2022-10-24 RX ORDER — ATORVASTATIN CALCIUM 20 MG/1
20 TABLET, FILM COATED ORAL DAILY
Qty: 90 TABLET | Refills: 1 | Status: SHIPPED | OUTPATIENT
Start: 2022-10-24

## 2022-10-24 RX ORDER — ALBUTEROL SULFATE 90 UG/1
2 AEROSOL, METERED RESPIRATORY (INHALATION) EVERY 6 HOURS PRN
Qty: 18 G | Refills: 5 | Status: SHIPPED | OUTPATIENT
Start: 2022-10-24

## 2022-10-24 ASSESSMENT — LIFESTYLE VARIABLES
HOW MANY STANDARD DRINKS CONTAINING ALCOHOL DO YOU HAVE ON A TYPICAL DAY: 1 OR 2
HOW OFTEN DO YOU HAVE A DRINK CONTAINING ALCOHOL: 2-4 TIMES A MONTH

## 2022-10-24 ASSESSMENT — PATIENT HEALTH QUESTIONNAIRE - PHQ9
6. FEELING BAD ABOUT YOURSELF - OR THAT YOU ARE A FAILURE OR HAVE LET YOURSELF OR YOUR FAMILY DOWN: 0
SUM OF ALL RESPONSES TO PHQ QUESTIONS 1-9: 5
SUM OF ALL RESPONSES TO PHQ QUESTIONS 1-9: 5
10. IF YOU CHECKED OFF ANY PROBLEMS, HOW DIFFICULT HAVE THESE PROBLEMS MADE IT FOR YOU TO DO YOUR WORK, TAKE CARE OF THINGS AT HOME, OR GET ALONG WITH OTHER PEOPLE: 0
4. FEELING TIRED OR HAVING LITTLE ENERGY: 0
5. POOR APPETITE OR OVEREATING: 0
SUM OF ALL RESPONSES TO PHQ QUESTIONS 1-9: 5
SUM OF ALL RESPONSES TO PHQ QUESTIONS 1-9: 5
1. LITTLE INTEREST OR PLEASURE IN DOING THINGS: 1
SUM OF ALL RESPONSES TO PHQ9 QUESTIONS 1 & 2: 2
3. TROUBLE FALLING OR STAYING ASLEEP: 3
2. FEELING DOWN, DEPRESSED OR HOPELESS: 1
7. TROUBLE CONCENTRATING ON THINGS, SUCH AS READING THE NEWSPAPER OR WATCHING TELEVISION: 0
9. THOUGHTS THAT YOU WOULD BE BETTER OFF DEAD, OR OF HURTING YOURSELF: 0
8. MOVING OR SPEAKING SO SLOWLY THAT OTHER PEOPLE COULD HAVE NOTICED. OR THE OPPOSITE, BEING SO FIGETY OR RESTLESS THAT YOU HAVE BEEN MOVING AROUND A LOT MORE THAN USUAL: 0

## 2022-10-24 NOTE — PATIENT INSTRUCTIONS
Personalized Preventive Plan for Kayla Age - 10/24/2022  Medicare offers a range of preventive health benefits. Some of the tests and screenings are paid in full while other may be subject to a deductible, co-insurance, and/or copay. Some of these benefits include a comprehensive review of your medical history including lifestyle, illnesses that may run in your family, and various assessments and screenings as appropriate. After reviewing your medical record and screening and assessments performed today your provider may have ordered immunizations, labs, imaging, and/or referrals for you. A list of these orders (if applicable) as well as your Preventive Care list are included within your After Visit Summary for your review. Other Preventive Recommendations:    A preventive eye exam performed by an eye specialist is recommended every 1-2 years to screen for glaucoma; cataracts, macular degeneration, and other eye disorders. A preventive dental visit is recommended every 6 months. Try to get at least 150 minutes of exercise per week or 10,000 steps per day on a pedometer . Order or download the FREE \"Exercise & Physical Activity: Your Everyday Guide\" from [employer]. Call 0-948.805.9676 or search [employer] online. You need 5831-3978 mg of calcium and 5488-0830 IU of vitamin D per day. It is possible to meet your calcium requirement with diet alone, but a vitamin D supplement is usually necessary to meet this goal.  When exposed to the sun, use a sunscreen that protects against both UVA and UVB radiation with an SPF of 30 or greater. Reapply every 2 to 3 hours or after sweating, drying off with a towel, or swimming. Always wear a seat belt when traveling in a car. Always wear a helmet when riding a bicycle or motorcycle.

## 2022-10-24 NOTE — PROGRESS NOTES
Medicare Annual Wellness Visit    Mickie Alexis is here for Medicare AWV, Asthma (6 month check up), Depression, and Hyperlipidemia  Not really going anywhere as  still on house arest  Not really golfing  Still smoking    Assessment & Plan   Other screening mammogram  Hyperlipidemia, unspecified hyperlipidemia type  -     atorvastatin (LIPITOR) 20 MG tablet; Take 1 tablet by mouth daily, Disp-90 tablet, R-1Normal  Severe obesity (BMI 35.0-39. 9) with comorbidity (HCC)  Rheumatoid arthritis involving multiple sites with positive rheumatoid factor (HCC)  -     C-Reactive Protein; Future  -     Sedimentation Rate; Future  -     Comprehensive Metabolic Panel; Future  Asthma with acute exacerbation, unspecified asthma severity, unspecified whether persistent  Severe chronic obstructive pulmonary disease (HCC)  Major depressive disorder, single episode, mild (HCC)  Tobacco use disorder    Recommendations for Preventive Services Due: see orders and patient instructions/AVS.  Recommended screening schedule for the next 5-10 years is provided to the patient in written form: see Patient Instructions/AVS.     No follow-ups on file. Subjective       Patient's complete Health Risk Assessment and screening values have been reviewed and are found in Flowsheets. The following problems were reviewed today and where indicated follow up appointments were made and/or referrals ordered. Positive Risk Factor Screenings with Interventions:    Fall Risk:  Do you feel unsteady or are you worried about falling? : (!) yes  2 or more falls in past year?: no  Fall with injury in past year?: no   Fall Risk Interventions:        Cognitive:   Words recalled: 2 Words Recalled  Total Score Interpretation: Abnormal Mini-Cog  Cognitive Impairment Interventions:  Patient declines any further evaluation/treatment for cognitive impairment    Depression:  PHQ-2 Score: 2  PHQ-9 Total Score: 5    Severity:1-4 = minimal depression, 5-9 = mild depression, 10-14 = moderate depression, 15-19 = moderately severe depression, 20-27 = severe depression  Depression Interventions:  Not really a drinker    Tobacco Use:  Tobacco Use: High Risk    Smoking Tobacco Use: Every Day    Smokeless Tobacco Use: Never    Passive Exposure: Not on file     E-cigarette/Vaping       Questions Responses    E-cigarette/Vaping Use     Start Date     Passive Exposure     Quit Date     Counseling Given     Comments           Substance Use - Tobacco Interventions:  tobacco cessation tips and resources provided    Alcohol Screening:  Alcohol Use: Not At Risk    Frequency of Alcohol Consumption: 2-4 times a month    Average Number of Drinks: 1 or 2    Frequency of Binge Drinking: Never     AUDIT-C Score: 2     An AUDIT-C score of 3-7 indicates potential alcohol risk. An AUDIT Total score of 8 or more is associated with harmful or hazardous drinking. A score of 13 or more in women, and 15 or more in men, is likely to indicate alcohol dependence.   Substance Use - Alcohol Interventions:  Not really a drinker        General Health and ACP:  General  In general, how would you say your health is?: Fair  In the past 7 days, have you experienced any of the following: New or Increased Pain, New or Increased Fatigue, Loneliness, Social Isolation, Stress or Anger?: (!) Yes  Select all that apply: (!) Stress  Do you get the social and emotional support that you need?: Yes  Do you have a Living Will?: Yes    Advance Directives       Power of  Living Will ACP-Advance Directive ACP-Power of     Not on File Filed on 10/09/19 Filed Not on File        General Health Risk Interventions:  Pain issues: has rheumatoid arthritis alwayss at some level of pain but controlled      Health Habits/Nutrition:  Physical Activity: Inactive    Days of Exercise per Week: 0 days    Minutes of Exercise per Session: 0 min     Have you lost any weight without trying in the past 3 months?: No  Body mass index: (!) 39.6  Have you seen the dentist within the past year?: (!) No  Health Habits/Nutrition Interventions:  Inadequate physical activity:  patient is not ready to increase his/her physical activity level at this time  Nutritional issues:  patient is not ready to address his/her nutritional/weight issues at this time    Hearing/Vision:  Do you or your family notice any trouble with your hearing that hasn't been managed with hearing aids?: No  Do you have difficulty driving, watching TV, or doing any of your daily activities because of your eyesight?: (!) Yes  Have you had an eye exam within the past year?: Yes  No results found. Hearing/Vision Interventions:      Has cataracts only pay for a 2          Objective   Vitals:    10/24/22 1510   BP: 132/72   Site: Left Upper Arm   Position: Sitting   Cuff Size: Large Adult   Pulse: 92   Resp: 18   Temp: (!) 96.7 °F (35.9 °C)   TempSrc: Temporal   SpO2: 92%   Weight: 206 lb 3.2 oz (93.5 kg)   Height: 5' 0.5\" (1.537 m)      Body mass index is 39.61 kg/m².       General Appearance: alert and oriented to person, place and time, well developed and well- nourished, in no acute distress  Skin: warm and dry, no rash or erythema  Head: normocephalic and atraumatic  Neck: supple and non-tender without mass, no thyromegaly or thyroid nodules, no cervical lymphadenopathy  Pulmonary/Chest: clear to auscultation bilaterally- no wheezes, rales or rhonchi, normal air movement, no respiratory distress  Cardiovascular: normal rate, regular rhythm, normal S1 and S2, no murmurs, rubs, clicks, or gallops, distal pulses intact, no carotid bruits  Abdomen: soft, non-tender, non-distended, normal bowel sounds, no masses or organomegaly  Extremities: no cyanosis, clubbing or edema  Musculoskeletal: normal range of motion, no joint swelling, deformity or tenderness  Neurologic: reflexes normal and symmetric, no cranial nerve deficit, gait, coordination and speech normal       Allergies Allergen Reactions    Penicillins      Prior to Visit Medications    Medication Sig Taking?  Authorizing Provider   albuterol sulfate HFA (VENTOLIN HFA) 108 (90 Base) MCG/ACT inhaler Inhale 2 puffs into the lungs every 6 hours as needed for Wheezing Yes Britt Johnson MD   atorvastatin (LIPITOR) 20 MG tablet Take 1 tablet by mouth daily Yes Britt Johnson MD   TRELEGY ELLIPTA 200-62.5-25 MCG/INH AEPB INHALE ONE PUFF BY MOUTH ONCE DAILY Yes Britt Johnson MD   escitalopram (LEXAPRO) 10 MG tablet TAKE ONE TABLET BY MOUTH ONCE DAILY Yes Britt Johnson MD   sulfaSALAzine (AZULFIDINE) 500 MG EC tablet Take 500 mg by mouth 2 times daily  Yes Historical Provider, MD   celecoxib (CELEBREX) 200 MG capsule Take 1 capsule by mouth 2 times daily Yes Britt Johnson MD   albuterol (PROVENTIL) (2.5 MG/3ML) 0.083% nebulizer solution Take 3 mLs by nebulization every 6 hours as needed for Wheezing  Patient not taking: Reported on 10/24/2022  Britt Johnson MD       [facility] (Including outside providers/suppliers regularly involved in providing care):   Patient Care Team:  Britt Johnson MD as PCP - General (Family Medicine)  Britt Johnson MD as PCP - [facility] Empaneled Provider     Reviewed and updated this visit:  Tobacco  Allergies  Meds  Problems  Med Hx  Surg Hx  Soc Hx  Fam Hx

## 2022-11-03 DIAGNOSIS — F32.A DEPRESSION, UNSPECIFIED DEPRESSION TYPE: ICD-10-CM

## 2022-11-03 RX ORDER — ESCITALOPRAM OXALATE 10 MG/1
TABLET ORAL
Qty: 90 TABLET | Refills: 1 | Status: SHIPPED | OUTPATIENT
Start: 2022-11-03

## 2022-11-03 NOTE — TELEPHONE ENCOUNTER
Medication Refill Request    LOV 10/24/2022  NOV 4/26/2023    Lab Results   Component Value Date    CREATININE 0.8 07/18/2022

## 2022-12-28 DIAGNOSIS — J45.901 ASTHMA WITH ACUTE EXACERBATION, UNSPECIFIED ASTHMA SEVERITY, UNSPECIFIED WHETHER PERSISTENT: ICD-10-CM

## 2022-12-29 RX ORDER — FLUTICASONE FUROATE, UMECLIDINIUM BROMIDE AND VILANTEROL TRIFENATATE 200; 62.5; 25 UG/1; UG/1; UG/1
POWDER RESPIRATORY (INHALATION)
Qty: 1 EACH | Refills: 3 | Status: ON HOLD
Start: 2022-12-29 | End: 2023-01-17 | Stop reason: HOSPADM

## 2023-01-14 ENCOUNTER — HOSPITAL ENCOUNTER (INPATIENT)
Age: 73
LOS: 3 days | Discharge: HOME | DRG: 190 | End: 2023-01-17
Attending: EMERGENCY MEDICINE | Admitting: FAMILY MEDICINE
Payer: MEDICARE

## 2023-01-14 ENCOUNTER — APPOINTMENT (OUTPATIENT)
Dept: GENERAL RADIOLOGY | Age: 73
DRG: 190 | End: 2023-01-14
Payer: MEDICARE

## 2023-01-14 DIAGNOSIS — J96.01 ACUTE RESPIRATORY FAILURE WITH HYPOXIA AND HYPERCAPNIA (HCC): Primary | ICD-10-CM

## 2023-01-14 DIAGNOSIS — J96.02 ACUTE RESPIRATORY FAILURE WITH HYPOXIA AND HYPERCAPNIA (HCC): Primary | ICD-10-CM

## 2023-01-14 DIAGNOSIS — R06.03 RESPIRATORY DISTRESS: ICD-10-CM

## 2023-01-14 DIAGNOSIS — J44.1 ACUTE EXACERBATION OF COPD WITH ASTHMA (HCC): ICD-10-CM

## 2023-01-14 DIAGNOSIS — J45.901 ACUTE EXACERBATION OF COPD WITH ASTHMA (HCC): ICD-10-CM

## 2023-01-14 LAB
ADENOVIRUS BY PCR: NOT DETECTED
ALBUMIN SERPL-MCNC: 2.8 G/DL (ref 3.5–5.2)
ALBUMIN SERPL-MCNC: 4 G/DL (ref 3.5–5.2)
ALP BLD-CCNC: 51 U/L (ref 35–104)
ALP BLD-CCNC: 79 U/L (ref 35–104)
ALT SERPL-CCNC: 10 U/L (ref 0–32)
ALT SERPL-CCNC: 16 U/L (ref 0–32)
ANION GAP SERPL CALCULATED.3IONS-SCNC: 10 MMOL/L (ref 7–16)
ANION GAP SERPL CALCULATED.3IONS-SCNC: 10 MMOL/L (ref 7–16)
AST SERPL-CCNC: 16 U/L (ref 0–31)
AST SERPL-CCNC: 21 U/L (ref 0–31)
B.E.: -5.9 MMOL/L (ref -3–3)
BASOPHILS ABSOLUTE: 0.07 E9/L (ref 0–0.2)
BASOPHILS RELATIVE PERCENT: 0.6 % (ref 0–2)
BILIRUB SERPL-MCNC: 0.2 MG/DL (ref 0–1.2)
BILIRUB SERPL-MCNC: 0.3 MG/DL (ref 0–1.2)
BORDETELLA PARAPERTUSSIS BY PCR: NOT DETECTED
BORDETELLA PERTUSSIS BY PCR: NOT DETECTED
BUN BLDV-MCNC: 16 MG/DL (ref 6–23)
BUN BLDV-MCNC: 18 MG/DL (ref 6–23)
CALCIUM SERPL-MCNC: 6.3 MG/DL (ref 8.6–10.2)
CALCIUM SERPL-MCNC: 9.1 MG/DL (ref 8.6–10.2)
CHLAMYDOPHILIA PNEUMONIAE BY PCR: NOT DETECTED
CHLORIDE BLD-SCNC: 105 MMOL/L (ref 98–107)
CHLORIDE BLD-SCNC: 110 MMOL/L (ref 98–107)
CO2: 17 MMOL/L (ref 22–29)
CO2: 23 MMOL/L (ref 22–29)
COHB: 3.6 % (ref 0–1.5)
COMMENT: ABNORMAL
CORONAVIRUS 229E BY PCR: NOT DETECTED
CORONAVIRUS HKU1 BY PCR: NOT DETECTED
CORONAVIRUS NL63 BY PCR: NOT DETECTED
CORONAVIRUS OC43 BY PCR: NOT DETECTED
CREAT SERPL-MCNC: 0.6 MG/DL (ref 0.5–1)
CREAT SERPL-MCNC: 0.8 MG/DL (ref 0.5–1)
CRITICAL: ABNORMAL
DATE ANALYZED: ABNORMAL
DATE OF COLLECTION: ABNORMAL
EOSINOPHILS ABSOLUTE: 0.46 E9/L (ref 0.05–0.5)
EOSINOPHILS RELATIVE PERCENT: 3.8 % (ref 0–6)
GFR SERPL CREATININE-BSD FRML MDRD: >60 ML/MIN/1.73
GFR SERPL CREATININE-BSD FRML MDRD: >60 ML/MIN/1.73
GLUCOSE BLD-MCNC: 125 MG/DL (ref 74–99)
GLUCOSE BLD-MCNC: 171 MG/DL (ref 74–99)
HCO3: 19.7 MMOL/L (ref 22–26)
HCT VFR BLD CALC: 46.1 % (ref 34–48)
HEMOGLOBIN: 15.1 G/DL (ref 11.5–15.5)
HHB: 1.9 % (ref 0–5)
HUMAN METAPNEUMOVIRUS BY PCR: NOT DETECTED
HUMAN RHINOVIRUS/ENTEROVIRUS BY PCR: NOT DETECTED
IMMATURE GRANULOCYTES #: 0.04 E9/L
IMMATURE GRANULOCYTES %: 0.3 % (ref 0–5)
INFLUENZA A BY PCR: NOT DETECTED
INFLUENZA A BY PCR: NOT DETECTED
INFLUENZA B BY PCR: NOT DETECTED
INFLUENZA B BY PCR: NOT DETECTED
LAB: ABNORMAL
LYMPHOCYTES ABSOLUTE: 1.85 E9/L (ref 1.5–4)
LYMPHOCYTES RELATIVE PERCENT: 15.4 % (ref 20–42)
Lab: ABNORMAL
MAGNESIUM: 2.4 MG/DL (ref 1.6–2.6)
MAGNESIUM: 4.8 MG/DL (ref 1.6–2.6)
MCH RBC QN AUTO: 30.4 PG (ref 26–35)
MCHC RBC AUTO-ENTMCNC: 32.8 % (ref 32–34.5)
MCV RBC AUTO: 92.8 FL (ref 80–99.9)
METHB: 0.3 % (ref 0–1.5)
MODE: ABNORMAL
MONOCYTES ABSOLUTE: 0.69 E9/L (ref 0.1–0.95)
MONOCYTES RELATIVE PERCENT: 5.7 % (ref 2–12)
MYCOPLASMA PNEUMONIAE BY PCR: NOT DETECTED
NEUTROPHILS ABSOLUTE: 8.91 E9/L (ref 1.8–7.3)
NEUTROPHILS RELATIVE PERCENT: 74.2 % (ref 43–80)
O2 CONTENT: 20.3 ML/DL
O2 SATURATION: 98 % (ref 92–98.5)
O2HB: 94.2 % (ref 94–97)
OPERATOR ID: 316
PARAINFLUENZA VIRUS 1 BY PCR: NOT DETECTED
PARAINFLUENZA VIRUS 2 BY PCR: NOT DETECTED
PARAINFLUENZA VIRUS 3 BY PCR: NOT DETECTED
PARAINFLUENZA VIRUS 4 BY PCR: NOT DETECTED
PATIENT TEMP: 37 C
PCO2: 39.2 MMHG (ref 35–45)
PDW BLD-RTO: 15.1 FL (ref 11.5–15)
PH BLOOD GAS: 7.32 (ref 7.35–7.45)
PLATELET # BLD: 259 E9/L (ref 130–450)
PMV BLD AUTO: 11.7 FL (ref 7–12)
PO2: 112.8 MMHG (ref 75–100)
POTASSIUM REFLEX MAGNESIUM: 3 MMOL/L (ref 3.5–5)
POTASSIUM REFLEX MAGNESIUM: 5.1 MMOL/L (ref 3.5–5)
PRO-BNP: 134 PG/ML (ref 0–125)
PROCALCITONIN: 0.04 NG/ML (ref 0–0.08)
RBC # BLD: 4.97 E12/L (ref 3.5–5.5)
REASON FOR REJECTION: NORMAL
REJECTED TEST: NORMAL
RESPIRATORY SYNCYTIAL VIRUS BY PCR: NOT DETECTED
SARS-COV-2, NAAT: NOT DETECTED
SARS-COV-2, PCR: NOT DETECTED
SODIUM BLD-SCNC: 137 MMOL/L (ref 132–146)
SODIUM BLD-SCNC: 138 MMOL/L (ref 132–146)
SOURCE, BLOOD GAS: ABNORMAL
THB: 15.2 G/DL (ref 11.5–16.5)
TIME ANALYZED: 1004
TOTAL PROTEIN: 5 G/DL (ref 6.4–8.3)
TOTAL PROTEIN: 7.4 G/DL (ref 6.4–8.3)
TROPONIN, HIGH SENSITIVITY: 12 NG/L (ref 0–9)
TROPONIN, HIGH SENSITIVITY: 9 NG/L (ref 0–9)
WBC # BLD: 12 E9/L (ref 4.5–11.5)

## 2023-01-14 PROCEDURE — 94664 DEMO&/EVAL PT USE INHALER: CPT

## 2023-01-14 PROCEDURE — 2500000003 HC RX 250 WO HCPCS

## 2023-01-14 PROCEDURE — 96375 TX/PRO/DX INJ NEW DRUG ADDON: CPT

## 2023-01-14 PROCEDURE — 85025 COMPLETE CBC W/AUTO DIFF WBC: CPT

## 2023-01-14 PROCEDURE — 71045 X-RAY EXAM CHEST 1 VIEW: CPT

## 2023-01-14 PROCEDURE — 84145 PROCALCITONIN (PCT): CPT

## 2023-01-14 PROCEDURE — 6360000002 HC RX W HCPCS: Performed by: FAMILY MEDICINE

## 2023-01-14 PROCEDURE — 6360000002 HC RX W HCPCS

## 2023-01-14 PROCEDURE — 80053 COMPREHEN METABOLIC PANEL: CPT

## 2023-01-14 PROCEDURE — 6370000000 HC RX 637 (ALT 250 FOR IP): Performed by: FAMILY MEDICINE

## 2023-01-14 PROCEDURE — 93005 ELECTROCARDIOGRAM TRACING: CPT

## 2023-01-14 PROCEDURE — 6370000000 HC RX 637 (ALT 250 FOR IP)

## 2023-01-14 PROCEDURE — 87635 SARS-COV-2 COVID-19 AMP PRB: CPT

## 2023-01-14 PROCEDURE — 94640 AIRWAY INHALATION TREATMENT: CPT

## 2023-01-14 PROCEDURE — 87502 INFLUENZA DNA AMP PROBE: CPT

## 2023-01-14 PROCEDURE — 84484 ASSAY OF TROPONIN QUANT: CPT

## 2023-01-14 PROCEDURE — 2580000003 HC RX 258: Performed by: FAMILY MEDICINE

## 2023-01-14 PROCEDURE — 99285 EMERGENCY DEPT VISIT HI MDM: CPT

## 2023-01-14 PROCEDURE — 2060000000 HC ICU INTERMEDIATE R&B

## 2023-01-14 PROCEDURE — 83880 ASSAY OF NATRIURETIC PEPTIDE: CPT

## 2023-01-14 PROCEDURE — 83735 ASSAY OF MAGNESIUM: CPT

## 2023-01-14 PROCEDURE — 0202U NFCT DS 22 TRGT SARS-COV-2: CPT

## 2023-01-14 PROCEDURE — 87449 NOS EACH ORGANISM AG IA: CPT

## 2023-01-14 PROCEDURE — 36415 COLL VENOUS BLD VENIPUNCTURE: CPT

## 2023-01-14 PROCEDURE — 96365 THER/PROPH/DIAG IV INF INIT: CPT

## 2023-01-14 PROCEDURE — 82805 BLOOD GASES W/O2 SATURATION: CPT

## 2023-01-14 RX ORDER — ONDANSETRON 2 MG/ML
4 INJECTION INTRAMUSCULAR; INTRAVENOUS EVERY 6 HOURS PRN
Status: DISCONTINUED | OUTPATIENT
Start: 2023-01-14 | End: 2023-01-17 | Stop reason: HOSPADM

## 2023-01-14 RX ORDER — PREDNISONE 20 MG/1
40 TABLET ORAL DAILY
Status: DISCONTINUED | OUTPATIENT
Start: 2023-01-17 | End: 2023-01-17 | Stop reason: HOSPADM

## 2023-01-14 RX ORDER — ONDANSETRON 4 MG/1
4 TABLET, ORALLY DISINTEGRATING ORAL EVERY 8 HOURS PRN
Status: DISCONTINUED | OUTPATIENT
Start: 2023-01-14 | End: 2023-01-17 | Stop reason: HOSPADM

## 2023-01-14 RX ORDER — CALCIUM GLUCONATE 20 MG/ML
1000 INJECTION, SOLUTION INTRAVENOUS ONCE
Status: COMPLETED | OUTPATIENT
Start: 2023-01-14 | End: 2023-01-14

## 2023-01-14 RX ORDER — OXYCODONE HYDROCHLORIDE 5 MG/1
10 TABLET ORAL EVERY 4 HOURS PRN
Status: DISCONTINUED | OUTPATIENT
Start: 2023-01-14 | End: 2023-01-17 | Stop reason: HOSPADM

## 2023-01-14 RX ORDER — METHYLPREDNISOLONE SODIUM SUCCINATE 125 MG/2ML
125 INJECTION, POWDER, LYOPHILIZED, FOR SOLUTION INTRAMUSCULAR; INTRAVENOUS ONCE
Status: COMPLETED | OUTPATIENT
Start: 2023-01-14 | End: 2023-01-14

## 2023-01-14 RX ORDER — NICOTINE 21 MG/24HR
1 PATCH, TRANSDERMAL 24 HOURS TRANSDERMAL DAILY
Status: DISCONTINUED | OUTPATIENT
Start: 2023-01-14 | End: 2023-01-17 | Stop reason: HOSPADM

## 2023-01-14 RX ORDER — POTASSIUM CHLORIDE 7.45 MG/ML
10 INJECTION INTRAVENOUS ONCE
Status: COMPLETED | OUTPATIENT
Start: 2023-01-14 | End: 2023-01-14

## 2023-01-14 RX ORDER — MORPHINE SULFATE 4 MG/ML
4 INJECTION, SOLUTION INTRAMUSCULAR; INTRAVENOUS
Status: DISCONTINUED | OUTPATIENT
Start: 2023-01-14 | End: 2023-01-17 | Stop reason: HOSPADM

## 2023-01-14 RX ORDER — SODIUM CHLORIDE 9 MG/ML
INJECTION, SOLUTION INTRAVENOUS PRN
Status: DISCONTINUED | OUTPATIENT
Start: 2023-01-14 | End: 2023-01-17 | Stop reason: HOSPADM

## 2023-01-14 RX ORDER — ATORVASTATIN CALCIUM 20 MG/1
20 TABLET, FILM COATED ORAL DAILY
Status: DISCONTINUED | OUTPATIENT
Start: 2023-01-15 | End: 2023-01-17 | Stop reason: HOSPADM

## 2023-01-14 RX ORDER — SODIUM CHLORIDE 0.9 % (FLUSH) 0.9 %
5-40 SYRINGE (ML) INJECTION PRN
Status: DISCONTINUED | OUTPATIENT
Start: 2023-01-14 | End: 2023-01-17 | Stop reason: HOSPADM

## 2023-01-14 RX ORDER — MORPHINE SULFATE 2 MG/ML
2 INJECTION, SOLUTION INTRAMUSCULAR; INTRAVENOUS
Status: DISCONTINUED | OUTPATIENT
Start: 2023-01-14 | End: 2023-01-17 | Stop reason: HOSPADM

## 2023-01-14 RX ORDER — SULFASALAZINE 500 MG/1
500 TABLET ORAL 2 TIMES DAILY
Status: DISCONTINUED | OUTPATIENT
Start: 2023-01-14 | End: 2023-01-17 | Stop reason: HOSPADM

## 2023-01-14 RX ORDER — SODIUM CHLORIDE 0.9 % (FLUSH) 0.9 %
5-40 SYRINGE (ML) INJECTION EVERY 12 HOURS SCHEDULED
Status: DISCONTINUED | OUTPATIENT
Start: 2023-01-14 | End: 2023-01-17 | Stop reason: HOSPADM

## 2023-01-14 RX ORDER — METHYLPREDNISOLONE SODIUM SUCCINATE 40 MG/ML
40 INJECTION, POWDER, LYOPHILIZED, FOR SOLUTION INTRAMUSCULAR; INTRAVENOUS EVERY 8 HOURS
Status: COMPLETED | OUTPATIENT
Start: 2023-01-14 | End: 2023-01-16

## 2023-01-14 RX ORDER — ACETAMINOPHEN 650 MG/1
650 SUPPOSITORY RECTAL EVERY 6 HOURS PRN
Status: DISCONTINUED | OUTPATIENT
Start: 2023-01-14 | End: 2023-01-17 | Stop reason: HOSPADM

## 2023-01-14 RX ORDER — CELECOXIB 100 MG/1
200 CAPSULE ORAL 2 TIMES DAILY
Status: DISCONTINUED | OUTPATIENT
Start: 2023-01-14 | End: 2023-01-17 | Stop reason: HOSPADM

## 2023-01-14 RX ORDER — POTASSIUM CHLORIDE 20 MEQ/1
40 TABLET, EXTENDED RELEASE ORAL ONCE
Status: COMPLETED | OUTPATIENT
Start: 2023-01-14 | End: 2023-01-14

## 2023-01-14 RX ORDER — MAGNESIUM SULFATE IN WATER 40 MG/ML
2000 INJECTION, SOLUTION INTRAVENOUS ONCE
Status: COMPLETED | OUTPATIENT
Start: 2023-01-14 | End: 2023-01-14

## 2023-01-14 RX ORDER — ESCITALOPRAM OXALATE 10 MG/1
10 TABLET ORAL DAILY
Status: DISCONTINUED | OUTPATIENT
Start: 2023-01-15 | End: 2023-01-17 | Stop reason: HOSPADM

## 2023-01-14 RX ORDER — POLYETHYLENE GLYCOL 3350 17 G/17G
17 POWDER, FOR SOLUTION ORAL DAILY PRN
Status: DISCONTINUED | OUTPATIENT
Start: 2023-01-14 | End: 2023-01-17 | Stop reason: HOSPADM

## 2023-01-14 RX ORDER — IPRATROPIUM BROMIDE AND ALBUTEROL SULFATE 2.5; .5 MG/3ML; MG/3ML
1 SOLUTION RESPIRATORY (INHALATION)
Status: DISCONTINUED | OUTPATIENT
Start: 2023-01-14 | End: 2023-01-17 | Stop reason: HOSPADM

## 2023-01-14 RX ORDER — OXYCODONE HYDROCHLORIDE 5 MG/1
5 TABLET ORAL EVERY 4 HOURS PRN
Status: DISCONTINUED | OUTPATIENT
Start: 2023-01-14 | End: 2023-01-17 | Stop reason: HOSPADM

## 2023-01-14 RX ORDER — IPRATROPIUM BROMIDE AND ALBUTEROL SULFATE 2.5; .5 MG/3ML; MG/3ML
3 SOLUTION RESPIRATORY (INHALATION) ONCE
Status: COMPLETED | OUTPATIENT
Start: 2023-01-14 | End: 2023-01-14

## 2023-01-14 RX ORDER — BUDESONIDE 0.25 MG/2ML
250 INHALANT ORAL 2 TIMES DAILY
Status: DISCONTINUED | OUTPATIENT
Start: 2023-01-14 | End: 2023-01-17 | Stop reason: HOSPADM

## 2023-01-14 RX ORDER — ACETAMINOPHEN 325 MG/1
650 TABLET ORAL EVERY 6 HOURS PRN
Status: DISCONTINUED | OUTPATIENT
Start: 2023-01-14 | End: 2023-01-17 | Stop reason: HOSPADM

## 2023-01-14 RX ORDER — ARFORMOTEROL TARTRATE 15 UG/2ML
15 SOLUTION RESPIRATORY (INHALATION) 2 TIMES DAILY
Status: DISCONTINUED | OUTPATIENT
Start: 2023-01-14 | End: 2023-01-17 | Stop reason: HOSPADM

## 2023-01-14 RX ORDER — ENOXAPARIN SODIUM 100 MG/ML
40 INJECTION SUBCUTANEOUS DAILY
Status: DISCONTINUED | OUTPATIENT
Start: 2023-01-14 | End: 2023-01-17 | Stop reason: HOSPADM

## 2023-01-14 RX ADMIN — BUDESONIDE 250 MCG: 0.25 SUSPENSION RESPIRATORY (INHALATION) at 19:23

## 2023-01-14 RX ADMIN — IPRATROPIUM BROMIDE AND ALBUTEROL SULFATE 1 AMPULE: .5; 2.5 SOLUTION RESPIRATORY (INHALATION) at 19:22

## 2023-01-14 RX ADMIN — Medication 10 ML: at 19:56

## 2023-01-14 RX ADMIN — IPRATROPIUM BROMIDE AND ALBUTEROL SULFATE 3 AMPULE: .5; 2.5 SOLUTION RESPIRATORY (INHALATION) at 09:57

## 2023-01-14 RX ADMIN — ENOXAPARIN SODIUM 40 MG: 100 INJECTION SUBCUTANEOUS at 17:40

## 2023-01-14 RX ADMIN — CELECOXIB 200 MG: 100 CAPSULE ORAL at 19:56

## 2023-01-14 RX ADMIN — CALCIUM GLUCONATE 1000 MG: 20 INJECTION, SOLUTION INTRAVENOUS at 14:21

## 2023-01-14 RX ADMIN — ARFORMOTEROL TARTRATE 15 MCG: 15 SOLUTION RESPIRATORY (INHALATION) at 19:22

## 2023-01-14 RX ADMIN — LEVOFLOXACIN 750 MG: 500 TABLET, FILM COATED ORAL at 17:41

## 2023-01-14 RX ADMIN — MAGNESIUM SULFATE HEPTAHYDRATE 2000 MG: 40 INJECTION, SOLUTION INTRAVENOUS at 10:06

## 2023-01-14 RX ADMIN — METHYLPREDNISOLONE SODIUM SUCCINATE 40 MG: 40 INJECTION INTRAMUSCULAR; INTRAVENOUS at 17:40

## 2023-01-14 RX ADMIN — POTASSIUM CHLORIDE 40 MEQ: 1500 TABLET, EXTENDED RELEASE ORAL at 11:58

## 2023-01-14 RX ADMIN — POTASSIUM CHLORIDE 10 MEQ: 7.46 INJECTION, SOLUTION INTRAVENOUS at 12:01

## 2023-01-14 RX ADMIN — METHYLPREDNISOLONE SODIUM SUCCINATE 125 MG: 125 INJECTION, POWDER, FOR SOLUTION INTRAMUSCULAR; INTRAVENOUS at 10:04

## 2023-01-14 RX ADMIN — SULFASALAZINE 500 MG: 500 TABLET ORAL at 19:56

## 2023-01-14 ASSESSMENT — LIFESTYLE VARIABLES
HOW MANY STANDARD DRINKS CONTAINING ALCOHOL DO YOU HAVE ON A TYPICAL DAY: PATIENT DOES NOT DRINK
HOW OFTEN DO YOU HAVE A DRINK CONTAINING ALCOHOL: NEVER

## 2023-01-14 ASSESSMENT — ENCOUNTER SYMPTOMS
COLOR CHANGE: 0
COUGH: 0
VOMITING: 0
NAUSEA: 0
EYE DISCHARGE: 0
DIARRHEA: 0
SINUS PAIN: 0
CONSTIPATION: 0
CHEST TIGHTNESS: 1
ABDOMINAL PAIN: 0
SHORTNESS OF BREATH: 1
SORE THROAT: 0

## 2023-01-14 ASSESSMENT — PAIN - FUNCTIONAL ASSESSMENT: PAIN_FUNCTIONAL_ASSESSMENT: NONE - DENIES PAIN

## 2023-01-14 NOTE — ED NOTES
Called floor spoke to [employer] received pt stable for transport      [employer], RN  01/14/23 [id number]

## 2023-01-14 NOTE — H&P
[facility] History and Physical          ASSESSMENT:      Principal Problem:    COPD exacerbation ([facility] [address]  Active Problems:    Severe chronic obstructive pulmonary disease (HCC)    Rheumatoid arthritis ([facility] [address]    Hyperlipidemia    Depression    Tobacco use disorder  Resolved Problems:    * No resolved hospital problems. *      PLAN:    1. COPD exacerbation  -COVID and influenza negative. Respiratory panel pending  -We will also order strep and Legionella antigens, procalcitonin also ordered  -DuoNebs every 4 hours  -Oxygen therapy as needed  -Solu-Medrol every 8 hours for 48 hours and transition to prednisone 40 mg daily  -Repeat chest x-ray in the a.m.  -consult pulmonology if no improvement     2. Hypermagnesemia and hypocalcemia/hypokalemia  -Most likely secondary to use DuoNeb therapy since arrival  -Patient was given magnesium for the COPD exacerbation  -Has already received calcium gluconate for the hypocalcemia and potassium supplementation  -Continue to monitor electrolytes and replete as necessary. Will recheck K, Ca, Mg tonight and in am    3. Rheumatoid arthritis  -continue with home meds  -family concerned about concomitant organ damage due to exacerbation    4. Depression  -continue 10 mg lexapro    5. Hyperlipidemia  -continue 20mg lipitor    6. Tobacco abuse  -14 mcg nicoderm patch        Code Status: full  DVT prophylaxis: lovenox      CHIEF COMPLAINT: Worsening shortness of breath    History of Present Illness: Patient is a 68-year-old female who presented to the emergency department today for 1 week of worsening shortness of breath. She does have a longstanding history of COPD/emphysema tobacco abuse. Patient was brought in by EMS and given DuoNeb x2. She was found to be 98% after that treatment. In the emergency department she was given 125 mg of Solu-Medrol with 3 duo nebs back-to-back and magnesium sulfate infusion. Currently requiring 2 L of via nasal cannula. Because of the initial treatment she developed hypocalcemia and hypokalemia. She was given calcium gluconate in the emergency department COVID and influenza were initially negative. Chest x-ray showed no acute issues. Initial ABG: pH 7.319, PCO2 39.2, PO2 104.8, HCO3 19.7, base excess negative. Carboxyhemoglobin 3.6    Initial labs-year-old white blood cell count of 12.0, hemoglobin of 15.1 crit of 46.1, platelet count 574. Troponin of 12. BNP of 134.  137, potassium 3.0, chloride 110, CO2 17 anion gap 10 glucose 171 calcium 6.3. Magnesium 4.8. Informant(s) for H&P: Patient and electronic medical record    REVIEW OF SYSTEMS:  A comprehensive review of systems was negative except for: what is in the HPI      PMH:  Past Medical History:   Diagnosis Date    Asthma     Chronic renal disease, stage III [facility]) [[id number]] 7/18/2022    Depression     Hyperlipidemia     Osteoarthritis     Seizures ([facility] [address]        Surgical History:  Past Surgical History:   Procedure Laterality Date    HYSTERECTOMY (CERVIX STATUS UNKNOWN)      TONSILLECTOMY         Medications Prior to Admission:    Prior to Admission medications    Medication Sig Start Date End Date Taking?  Authorizing Provider   Lea Eastman 832-49.9-96 MCG/ACT AEPB inhaler INHALE ONE PUFF BY MOUTH ONCE DAILY 12/29/22  Yes Jayy Mark MD   escitalopram (LEXAPRO) 10 MG tablet TAKE ONE TABLET BY MOUTH ONCE DAILY 11/3/22  Yes Jayy Mark MD   albuterol sulfate HFA (VENTOLIN HFA) 108 (90 Base) MCG/ACT inhaler Inhale 2 puffs into the lungs every 6 hours as needed for Wheezing 10/24/22  Yes Jayy Mark MD   atorvastatin (LIPITOR) 20 MG tablet Take 1 tablet by mouth daily 10/24/22  Yes Jayy Mark MD   albuterol (PROVENTIL) (2.5 MG/3ML) 0.083% nebulizer solution Take 3 mLs by nebulization every 6 hours as needed for Wheezing 7/18/22  Yes Jayy Mark MD   sulfaSALAzine (AZULFIDINE) 500 MG EC tablet Take 500 mg by mouth 2 times daily  6/4/18 Yes Historical Provider, MD   celecoxib (CELEBREX) 200 MG capsule Take 1 capsule by mouth 2 times daily 9/5/18  Yes Pb Sauceda MD       Allergies:    Penicillins    Social History:    reports that she has been smoking cigarettes. She has a 50.00 pack-year smoking history. She has never used smokeless tobacco. She reports current alcohol use. She reports that she does not use drugs. Family History:   family history is not on file. PHYSICAL EXAM:  Vitals:  BP (!) 148/72   Pulse 80   Temp 98.2 °F (36.8 °C) (Oral)   Resp 19   Ht 5' (1.524 m)   Wt 206 lb (93.4 kg)   SpO2 91%   BMI 40.23 kg/m²     General Appearance: alert and oriented to person, place and time and in no acute distress  Skin: warm and dry  Head: normocephalic and atraumatic  Eyes: pupils equal, round, and reactive to light, extraocular eye movements intact, conjunctivae normal  Neck: neck supple and non tender without mass   Pulmonary/Chest: 2 L via nasal cannula. Very tight air movement throughout. Diffuse bilateral expiratory wheezes in all fields  Cardiovascular: normal rate, normal S1 and S2 and no carotid bruits  Abdomen: soft, non-tender, non-distended, normal bowel sounds, no masses or organomegaly  Extremities: no cyanosis, no clubbing and no edema  Neurologic: no cranial nerve deficit and speech normal        LABS:  Recent Labs     01/14/23  1100      K 3.0*   *   CO2 17*   BUN 16   CREATININE 0.6   GLUCOSE 171*   CALCIUM 6.3*       Recent Labs     01/14/23  1009   WBC 12.0*   RBC 4.97   HGB 15.1   HCT 46.1   MCV 92.8   MCH 30.4   MCHC 32.8   RDW 15.1*      MPV 11.7       No results for input(s): POCGLU in the last 72 hours.     CBC with Differential:    Lab Results   Component Value Date/Time    WBC 12.0 01/14/2023 10:09 AM    RBC 4.97 01/14/2023 10:09 AM    HGB 15.1 01/14/2023 10:09 AM    HCT 46.1 01/14/2023 10:09 AM     01/14/2023 10:09 AM    MCV 92.8 01/14/2023 10:09 AM    MCH 30.4 01/14/2023 10:09 AM    MCHC 32.8 01/14/2023 10:09 AM    RDW 15.1 01/14/2023 10:09 AM    LYMPHOPCT 15.4 01/14/2023 10:09 AM    MONOPCT 5.7 01/14/2023 10:09 AM    BASOPCT 0.6 01/14/2023 10:09 AM    MONOSABS 0.69 01/14/2023 10:09 AM    LYMPHSABS 1.85 01/14/2023 10:09 AM    EOSABS 0.46 01/14/2023 10:09 AM    BASOSABS 0.07 01/14/2023 10:09 AM     CMP:    Lab Results   Component Value Date/Time     01/14/2023 11:00 AM    K 3.0 01/14/2023 11:00 AM     01/14/2023 11:00 AM    CO2 17 01/14/2023 11:00 AM    BUN 16 01/14/2023 11:00 AM    CREATININE 0.6 01/14/2023 11:00 AM    GFRAA >60 07/18/2022 03:03 PM    LABGLOM >60 01/14/2023 11:00 AM    GLUCOSE 171 01/14/2023 11:00 AM    PROT 5.0 01/14/2023 11:00 AM    LABALBU 2.8 01/14/2023 11:00 AM    CALCIUM 6.3 01/14/2023 11:00 AM    BILITOT 0.2 01/14/2023 11:00 AM    ALKPHOS 51 01/14/2023 11:00 AM    AST 16 01/14/2023 11:00 AM    ALT 10 01/14/2023 11:00 AM     Calcium:    Lab Results   Component Value Date/Time    CALCIUM 6.3 01/14/2023 11:00 AM     Magnesium:    Lab Results   Component Value Date/Time    MG 4.8 01/14/2023 11:00 AM       Radiology:   XR CHEST PORTABLE   Final Result   No acute cardiopulmonary process. EKG: Normal sinus rhythm. Questionable anterior infarct. No acute ST segment or T wave changes noted. No EKG for comparison. Last echo performed 10/21/2021 at [facility]  Ejection fraction of 60 to 65%, indeterminate diastolic function. Aortic valve sclerosis without stenosis. NOTE: This report was transcribed using voice recognition software. Every effort was made to ensure accuracy; however, inadvertent computerized transcription errors may be present.   Electronically signed by Germania Richardson DO on 1/14/2023 at 3:16 PM

## 2023-01-14 NOTE — ED PROVIDER NOTES
HPI     Patient is a 72 y.o. female presents with a chief complaint of SOB  This has been occurring for the past week and worsening.  Patient states that it gets better with nothing.  Patient states that it gets worse with nothing.  Patient states that it is severe in severity.  Patient states it was acute in onset.    She is complaining of shortness of breath and chest tightness but denies any cough, chest pain, abdominal pain, nausea, vomiting, diarrhea, dysuria, leg swelling, numbness or tingling.  Patient reports she does have a history of COPD and asthma.  1 week ago she began having some shortness of breath and it is just been worsening as the week has progressed, today being the worst.  She notes she was having difficulty just getting around the house due to being so short of breath.  Patient arrived via EMS and was given 2 DuoNeb's in route.  On presentation patient is at 98% while on her second DuoNeb treatment.  Review of Systems   Constitutional:  Negative for chills and fever.   HENT:  Negative for congestion, sinus pain and sore throat.    Eyes:  Negative for discharge and visual disturbance.   Respiratory:  Positive for chest tightness and shortness of breath. Negative for cough.    Cardiovascular:  Negative for chest pain and leg swelling.   Gastrointestinal:  Negative for abdominal pain, constipation, diarrhea, nausea and vomiting.   Endocrine: Negative for polyuria.   Genitourinary:  Negative for difficulty urinating, dysuria, frequency and hematuria.   Musculoskeletal:  Negative for arthralgias and joint swelling.   Skin:  Negative for color change and rash.   Neurological:  Negative for dizziness, weakness, light-headedness, numbness and headaches.   All other systems reviewed and are negative.     Physical Exam  Constitutional:       General: She is in acute distress.      Appearance: Normal appearance.   HENT:      Head: Normocephalic and atraumatic.      Mouth/Throat:      Mouth: Mucous  membranes are moist.      Pharynx: Oropharynx is clear. Eyes:      Extraocular Movements: Extraocular movements intact. Conjunctiva/sclera: Conjunctivae normal.      Pupils: Pupils are equal, round, and reactive to light. Cardiovascular:      Rate and Rhythm: Normal rate and regular rhythm. Pulses: Normal pulses. Heart sounds: Normal heart sounds. Pulmonary:      Effort: Accessory muscle usage and respiratory distress present. Breath sounds: Decreased breath sounds and wheezing present. Abdominal:      General: Abdomen is flat. Palpations: Abdomen is soft. Musculoskeletal:         General: No swelling. Normal range of motion. Cervical back: Normal range of motion and neck supple. Skin:     General: Skin is warm and dry. Neurological:      General: No focal deficit present. Mental Status: She is alert and oriented to person, place, and time. Psychiatric:         Mood and Affect: Mood normal.         Behavior: Behavior normal.        Procedures     MDM  Number of Diagnoses or Management Options  Diagnosis management comments: History From: patient and EMS    CONSULTS: (Who and What was discussed)  Internal medicine - discussed patient's case and plan for disposition; they agree to admit    Social Determinants of Health : None    Chronic Conditions affecting care: Wilber Abrams has a past medical history of Asthma, Chronic renal disease, stage III [facility]) [[id number]] (7/18/2022), Depression, Hyperlipidemia, Osteoarthritis, and Seizures ([facility] [address] Records Reviewed( Source) 10/24/22 PCP note reviewed; pt lungs CTAB with normal oxygen saturations    CC/HPI Summary, DDx, ED Course, and Reassessment:   Differential diagnosis including but not limited to PNA vs CHF vs COPD vs COVID vs flu vs ACS vs   Therefore, CBC, CMP, ABG, COVID, flu, RFA, CXR, troponin, BNP, and EKG ordered. EKG as documented in ED course. CXR revealing no acute consolidations or fluid overload.  CBC revealing only mild leukocytosis of 12.0, presumed to be reactive. CMP revealing hypokalemia of 3.0 which was replaced in ED, as well as hypocalcemia which was also replaced in ED. ABG revealing only mild acidosis with pH of 7.319, CO2 WNL and HCO3 of 19.7. COVID and flu negative. Troponin with downtrending delta of 3 thus less likely ACS. BNP only mildly elevated, unlikely to be CHF. Pt given 2g magnesium IV give persistent wheezing and SOB; 3 ampules DuoNebs with some improvement in sx. 1 g calcium gluconate IV for calcium replacement; 40 mEq K+ PO and 10 mEq K+ IV for potassium replacement. Given 125 mg IV solumedrol for COPD exacerbation. Pt on 2L NC at 92% at rest. Plan to admit to medicine for further management discussed with pt; she is agreeable to plan. Spoke with hospitalist who agreed to admit. Disposition: Admit to medicine for further management and evaluation given COPD exacerbation now requiring oxygen         Amount and/or Complexity of Data Reviewed  Clinical lab tests: reviewed  Tests in the radiology section of CPT®: reviewed  Tests in the medicine section of CPT®: reviewed         ED Course as of 01/14/23 1958   Sat Jan 14, 2023   1144 EKG: This EKG is signed by emergency department physician. Rate: 81  Rhythm: Sinus  Interpretation: T wave inversion lead V1 and lead III. No acute ST elevations or depressions  Comparison: no previous EKG available      [CP]   1346 On repeat examination, patient saturations 92% on 2 L. [CP]   7826 Spoke with Dr. Brayan Gomez who agreed to admit pt to medicine. [CP]      ED Course User Index  [CP] Nanci Stock DO      ED Course as of 01/14/23 1958   Sat Jan 14, 2023   1144 EKG: This EKG is signed by emergency department physician. Rate: 81  Rhythm: Sinus  Interpretation: T wave inversion lead V1 and lead III.  No acute ST elevations or depressions  Comparison: no previous EKG available      [CP]   1346 On repeat examination, patient saturations 92% on 2 L. [CP]   1411 Spoke with Dr. Juliette Neri who agreed to admit pt to medicine. [CP]      ED Course User Index  [CP] Sahara Bledsoe DO       --------------------------------------------- PAST HISTORY ---------------------------------------------  Past Medical History:  has a past medical history of Asthma, Chronic renal disease, stage III ([facility] [address] [[id number]], Depression, Hyperlipidemia, Osteoarthritis, and Seizures ([facility] [address] Past Surgical History:  has a past surgical history that includes Hysterectomy and Tonsillectomy. Social History:  reports that she has been smoking cigarettes. She has a 50.00 pack-year smoking history. She uses smokeless tobacco. She reports current alcohol use. She reports that she does not use drugs. Family History: family history is not on file. The patients home medications have been reviewed.     Allergies: Penicillins    -------------------------------------------------- RESULTS -------------------------------------------------    LABS:  Results for orders placed or performed during the hospital encounter of 01/14/23   COVID-19, Rapid    Specimen: Nasopharyngeal Swab   Result Value Ref Range    SARS-CoV-2, NAAT Not Detected Not Detected   Rapid influenza A/B antigens    Specimen: Nasopharyngeal   Result Value Ref Range    Influenza A by PCR Not Detected Not Detected    Influenza B by PCR Not Detected Not Detected   Respiratory Panel, Molecular, with COVID-19 (Restricted: peds pts or suitable admitted adults)    Specimen: Nasopharyngeal   Result Value Ref Range    Adenovirus by PCR Not Detected Not Detected    Bordetella parapertussis by PCR Not Detected Not Detected    Bordetella pertussis by PCR Not Detected Not Detected    Chlamydophilia pneumoniae by PCR Not Detected Not Detected    Coronavirus 229E by PCR Not Detected Not Detected    Coronavirus HKU1 by PCR Not Detected Not Detected    Coronavirus NL63 by PCR Not Detected Not Detected    Coronavirus OC43 by PCR Not Detected Not Detected    SARS-CoV-2, PCR Not Detected Not Detected    Human Metapneumovirus by PCR Not Detected Not Detected    Human Rhinovirus/Enterovirus by PCR Not Detected Not Detected    Influenza A by PCR Not Detected Not Detected    Influenza B by PCR Not Detected Not Detected    Mycoplasma pneumoniae by PCR Not Detected Not Detected    Parainfluenza Virus 1 by PCR Not Detected Not Detected    Parainfluenza Virus 2 by PCR Not Detected Not Detected    Parainfluenza Virus 3 by PCR Not Detected Not Detected    Parainfluenza Virus 4 by PCR Not Detected Not Detected    Respiratory Syncytial Virus by PCR Not Detected Not Detected   CBC with Auto Differential   Result Value Ref Range    WBC 12.0 (H) 4.5 - 11.5 E9/L    RBC 4.97 3.50 - 5.50 E12/L    Hemoglobin 15.1 11.5 - 15.5 g/dL    Hematocrit 46.1 34.0 - 48.0 %    MCV 92.8 80.0 - 99.9 fL    MCH 30.4 26.0 - 35.0 pg    MCHC 32.8 32.0 - 34.5 %    RDW 15.1 (H) 11.5 - 15.0 fL    Platelets 450 350 - 882 E9/L    MPV 11.7 7.0 - 12.0 fL    Neutrophils % 74.2 43.0 - 80.0 %    Immature Granulocytes % 0.3 0.0 - 5.0 %    Lymphocytes % 15.4 (L) 20.0 - 42.0 %    Monocytes % 5.7 2.0 - 12.0 %    Eosinophils % 3.8 0.0 - 6.0 %    Basophils % 0.6 0.0 - 2.0 %    Neutrophils Absolute 8.91 (H) 1.80 - 7.30 E9/L    Immature Granulocytes # 0.04 E9/L    Lymphocytes Absolute 1.85 1.50 - 4.00 E9/L    Monocytes Absolute 0.69 0.10 - 0.95 E9/L    Eosinophils Absolute 0.46 0.05 - 0.50 E9/L    Basophils Absolute 0.07 0.00 - 0.20 E9/L   Blood Gas, Arterial   Result Value Ref Range    Date Analyzed [id number]     Time Analyzed 1006     Source: Blood Arterial     pH, Blood Gas 7.319 (L) 7.350 - 7.450    PCO2 39.2 35.0 - 45.0 mmHg    PO2 112.8 (H) 75.0 - 100.0 mmHg    HCO3 19.7 (L) 22.0 - 26.0 mmol/L    B.E. -5.9 (L) -3.0 - 3.0 mmol/L    O2 Sat 98.0 92.0 - 98.5 %    O2Hb 94.2 94.0 - 97.0 %    COHb 3.6 (H) 0.0 - 1.5 %    MetHb 0.3 0.0 - 1.5 %    O2 Content 20.3 mL/dL    HHb 1.9 0.0 - 5.0 %    tHb (est) 15.2 11.5 - 16.5 g/dL    Mode NC-6  L Comment 6lpm aerosol tx running     Date Of Collection      Time Collected      Pt Temp 37.0 C     ID [id number]     Lab [id number]     Critical(s) Notified .  No Critical Values    SPECIMEN REJECTION   Result Value Ref Range    Rejected Test CMPX, TRP5, BNP     Reason for Rejection see below    Comprehensive Metabolic Panel w/ Reflex to MG   Result Value Ref Range    Sodium 137 132 - 146 mmol/L    Potassium reflex Magnesium 3.0 (L) 3.5 - 5.0 mmol/L    Chloride 110 (H) 98 - 107 mmol/L    CO2 17 (L) 22 - 29 mmol/L    Anion Gap 10 7 - 16 mmol/L    Glucose 171 (H) 74 - 99 mg/dL    BUN 16 6 - 23 mg/dL    Creatinine 0.6 0.5 - 1.0 mg/dL    Est, Glom Filt Rate >60 >=60 mL/min/1.73    Calcium 6.3 (LL) 8.6 - 10.2 mg/dL    Total Protein 5.0 (L) 6.4 - 8.3 g/dL    Albumin 2.8 (L) 3.5 - 5.2 g/dL    Total Bilirubin 0.2 0.0 - 1.2 mg/dL    Alkaline Phosphatase 51 35 - 104 U/L    ALT 10 0 - 32 U/L    AST 16 0 - 31 U/L   Troponin   Result Value Ref Range    Troponin, High Sensitivity 12 (H) 0 - 9 ng/L   Brain Natriuretic Peptide   Result Value Ref Range    Pro- (H) 0 - 125 pg/mL   Magnesium   Result Value Ref Range    Magnesium 4.8 (H) 1.6 - 2.6 mg/dL   Troponin   Result Value Ref Range    Troponin, High Sensitivity 9 0 - 9 ng/L   Comprehensive Metabolic Panel w/ Reflex to MG   Result Value Ref Range    Sodium 138 132 - 146 mmol/L    Potassium reflex Magnesium 5.1 (H) 3.5 - 5.0 mmol/L    Chloride 105 98 - 107 mmol/L    CO2 23 22 - 29 mmol/L    Anion Gap 10 7 - 16 mmol/L    Glucose 125 (H) 74 - 99 mg/dL    BUN 18 6 - 23 mg/dL    Creatinine 0.8 0.5 - 1.0 mg/dL    Est, Glom Filt Rate >60 >=60 mL/min/1.73    Calcium 9.1 8.6 - 10.2 mg/dL    Total Protein 7.4 6.4 - 8.3 g/dL    Albumin 4.0 3.5 - 5.2 g/dL    Total Bilirubin 0.3 0.0 - 1.2 mg/dL    Alkaline Phosphatase 79 35 - 104 U/L    ALT 16 0 - 32 U/L    AST 21 0 - 31 U/L   Magnesium   Result Value Ref Range    Magnesium 2.4 1.6 - 2.6 mg/dL   EKG 12 Lead   Result Value Ref Range Ventricular Rate 81 BPM    Atrial Rate 81 BPM    P-R Interval 180 ms    QRS Duration 84 ms    Q-T Interval 378 ms    QTc Calculation (Bazett) 439 ms    P Axis 70 degrees    R Axis -29 degrees    T Axis 57 degrees       RADIOLOGY:  XR CHEST PORTABLE   Final Result   No acute cardiopulmonary process. XR CHEST PORTABLE    (Results Pending)       ------------------------- NURSING NOTES AND VITALS REVIEWED ---------------------------  Date / Time Roomed:  1/14/2023  9:31 AM  ED Bed Assignment:  [id number]    The nursing notes within the ED encounter and vital signs as below have been reviewed. Patient Vitals for the past 24 hrs:   BP Temp Temp src Pulse Resp SpO2 Height Weight   01/14/23 1955 -- -- -- -- -- 92 % -- --   01/14/23 1852 116/67 97.9 °F (36.6 °C) Oral 87 20 -- -- --   01/14/23 1822 119/64 98.1 °F (36.7 °C) Oral 91 20 92 % -- --   01/14/23 1608 -- -- -- -- -- 92 % -- --   01/14/23 1400 (!) 148/72 98.2 °F (36.8 °C) Oral 80 19 91 % -- --   01/14/23 1340 (!) 141/70 -- -- 82 24 92 % -- --   01/14/23 1100 (!) 119/59 -- -- 90 20 92 % -- --   01/14/23 0958 -- -- -- (!) 103 23 97 % -- --   01/14/23 0956 -- -- -- (!) 108 28 98 % -- --   01/14/23 0940 (!) 148/82 98 °F (36.7 °C) -- (!) 101 18 98 % 5' (1.524 m) 206 lb (93.4 kg)       Oxygen Saturation Interpretation: Improved after treatment    ------------------------------------------ PROGRESS NOTES ------------------------------------------  Re-evaluation(s):  Time: 6337  Patients symptoms are improving  Repeat physical examination is not changed    Counseling:  I have spoken with the patient and discussed todays results, in addition to providing specific details for the plan of care and counseling regarding the diagnosis and prognosis. Their questions are answered at this time and they are agreeable with the plan of admission.    --------------------------------- ADDITIONAL PROVIDER NOTES ---------------------------------  Consultations:  Time: 5948. Spoke with Dr. Juliette Neri.  Discussed case. They will admit the patient. This patient's ED course included: a personal history and physicial examination, multiple bedside re-evaluations, IV medications, cardiac monitoring, and continuous pulse oximetry    This patient has remained hemodynamically stable during their ED course. Diagnosis:  1. COPD exacerbation ([facility] [address]    2. Respiratory distress        Disposition:  Patient's disposition: Admit to med/surg floor  Patient's condition is stable. Patient was seen and evaluated by myself and my attending Kimberly Moss MD. Assessment and Plan discussed with attending provider, please see attestation for final plan of care. This note was done using dictation software and there may be some grammatical errors associated with this.     DO Sahara Parker DO  Resident  01/14/23 5351

## 2023-01-15 ENCOUNTER — APPOINTMENT (OUTPATIENT)
Dept: CT IMAGING | Age: 73
DRG: 190 | End: 2023-01-15
Payer: MEDICARE

## 2023-01-15 ENCOUNTER — APPOINTMENT (OUTPATIENT)
Dept: GENERAL RADIOLOGY | Age: 73
DRG: 190 | End: 2023-01-15
Payer: MEDICARE

## 2023-01-15 LAB
ANION GAP SERPL CALCULATED.3IONS-SCNC: 10 MMOL/L (ref 7–16)
BASOPHILS ABSOLUTE: 0.01 E9/L (ref 0–0.2)
BASOPHILS RELATIVE PERCENT: 0.1 % (ref 0–2)
BUN BLDV-MCNC: 21 MG/DL (ref 6–23)
CALCIUM SERPL-MCNC: 9.1 MG/DL (ref 8.6–10.2)
CHLORIDE BLD-SCNC: 104 MMOL/L (ref 98–107)
CO2: 23 MMOL/L (ref 22–29)
CREAT SERPL-MCNC: 0.8 MG/DL (ref 0.5–1)
EKG ATRIAL RATE: 81 BPM
EKG P AXIS: 70 DEGREES
EKG P-R INTERVAL: 180 MS
EKG Q-T INTERVAL: 378 MS
EKG QRS DURATION: 84 MS
EKG QTC CALCULATION (BAZETT): 439 MS
EKG R AXIS: -29 DEGREES
EKG T AXIS: 57 DEGREES
EKG VENTRICULAR RATE: 81 BPM
EOSINOPHILS ABSOLUTE: 0 E9/L (ref 0.05–0.5)
EOSINOPHILS RELATIVE PERCENT: 0 % (ref 0–6)
GFR SERPL CREATININE-BSD FRML MDRD: >60 ML/MIN/1.73
GLUCOSE BLD-MCNC: 109 MG/DL (ref 74–99)
HCT VFR BLD CALC: 44.2 % (ref 34–48)
HEMOGLOBIN: 14.2 G/DL (ref 11.5–15.5)
IMMATURE GRANULOCYTES #: 0.04 E9/L
IMMATURE GRANULOCYTES %: 0.4 % (ref 0–5)
L. PNEUMOPHILA SEROGP 1 UR AG: NORMAL
LACTIC ACID: 0.8 MMOL/L (ref 0.5–2.2)
LYMPHOCYTES ABSOLUTE: 0.8 E9/L (ref 1.5–4)
LYMPHOCYTES RELATIVE PERCENT: 7.8 % (ref 20–42)
MCH RBC QN AUTO: 29.3 PG (ref 26–35)
MCHC RBC AUTO-ENTMCNC: 32.1 % (ref 32–34.5)
MCV RBC AUTO: 91.3 FL (ref 80–99.9)
MONOCYTES ABSOLUTE: 0.22 E9/L (ref 0.1–0.95)
MONOCYTES RELATIVE PERCENT: 2.1 % (ref 2–12)
NEUTROPHILS ABSOLUTE: 9.23 E9/L (ref 1.8–7.3)
NEUTROPHILS RELATIVE PERCENT: 89.6 % (ref 43–80)
PDW BLD-RTO: 14.6 FL (ref 11.5–15)
PLATELET # BLD: 240 E9/L (ref 130–450)
PMV BLD AUTO: 10.6 FL (ref 7–12)
POTASSIUM REFLEX MAGNESIUM: 4.9 MMOL/L (ref 3.5–5)
RBC # BLD: 4.84 E12/L (ref 3.5–5.5)
SODIUM BLD-SCNC: 137 MMOL/L (ref 132–146)
STREP PNEUMONIAE ANTIGEN, URINE: NORMAL
WBC # BLD: 10.3 E9/L (ref 4.5–11.5)

## 2023-01-15 PROCEDURE — 71045 X-RAY EXAM CHEST 1 VIEW: CPT

## 2023-01-15 PROCEDURE — 6360000004 HC RX CONTRAST MEDICATION: Performed by: RADIOLOGY

## 2023-01-15 PROCEDURE — 36415 COLL VENOUS BLD VENIPUNCTURE: CPT

## 2023-01-15 PROCEDURE — 2580000003 HC RX 258: Performed by: FAMILY MEDICINE

## 2023-01-15 PROCEDURE — 94640 AIRWAY INHALATION TREATMENT: CPT

## 2023-01-15 PROCEDURE — 83605 ASSAY OF LACTIC ACID: CPT

## 2023-01-15 PROCEDURE — 6360000002 HC RX W HCPCS: Performed by: FAMILY MEDICINE

## 2023-01-15 PROCEDURE — 85025 COMPLETE CBC W/AUTO DIFF WBC: CPT

## 2023-01-15 PROCEDURE — 6370000000 HC RX 637 (ALT 250 FOR IP): Performed by: FAMILY MEDICINE

## 2023-01-15 PROCEDURE — 71275 CT ANGIOGRAPHY CHEST: CPT

## 2023-01-15 PROCEDURE — 80048 BASIC METABOLIC PNL TOTAL CA: CPT

## 2023-01-15 PROCEDURE — 2060000000 HC ICU INTERMEDIATE R&B

## 2023-01-15 RX ADMIN — ATORVASTATIN CALCIUM 20 MG: 20 TABLET, FILM COATED ORAL at 08:10

## 2023-01-15 RX ADMIN — ARFORMOTEROL TARTRATE 15 MCG: 15 SOLUTION RESPIRATORY (INHALATION) at 08:15

## 2023-01-15 RX ADMIN — CELECOXIB 200 MG: 100 CAPSULE ORAL at 19:45

## 2023-01-15 RX ADMIN — LEVOFLOXACIN 750 MG: 500 TABLET, FILM COATED ORAL at 08:10

## 2023-01-15 RX ADMIN — BUDESONIDE 250 MCG: 0.25 SUSPENSION RESPIRATORY (INHALATION) at 19:55

## 2023-01-15 RX ADMIN — Medication 10 ML: at 09:00

## 2023-01-15 RX ADMIN — ESCITALOPRAM OXALATE 10 MG: 10 TABLET ORAL at 08:11

## 2023-01-15 RX ADMIN — SULFASALAZINE 500 MG: 500 TABLET ORAL at 08:10

## 2023-01-15 RX ADMIN — ARFORMOTEROL TARTRATE 15 MCG: 15 SOLUTION RESPIRATORY (INHALATION) at 19:55

## 2023-01-15 RX ADMIN — IOPAMIDOL 75 ML: 755 INJECTION, SOLUTION INTRAVENOUS at 12:45

## 2023-01-15 RX ADMIN — CELECOXIB 200 MG: 100 CAPSULE ORAL at 08:10

## 2023-01-15 RX ADMIN — IPRATROPIUM BROMIDE AND ALBUTEROL SULFATE 1 AMPULE: .5; 2.5 SOLUTION RESPIRATORY (INHALATION) at 19:55

## 2023-01-15 RX ADMIN — METHYLPREDNISOLONE SODIUM SUCCINATE 40 MG: 40 INJECTION INTRAMUSCULAR; INTRAVENOUS at 18:08

## 2023-01-15 RX ADMIN — IPRATROPIUM BROMIDE AND ALBUTEROL SULFATE 1 AMPULE: .5; 2.5 SOLUTION RESPIRATORY (INHALATION) at 15:59

## 2023-01-15 RX ADMIN — BUDESONIDE 250 MCG: 0.25 SUSPENSION RESPIRATORY (INHALATION) at 08:15

## 2023-01-15 RX ADMIN — METHYLPREDNISOLONE SODIUM SUCCINATE 40 MG: 40 INJECTION INTRAMUSCULAR; INTRAVENOUS at 02:31

## 2023-01-15 RX ADMIN — ENOXAPARIN SODIUM 40 MG: 100 INJECTION SUBCUTANEOUS at 08:11

## 2023-01-15 RX ADMIN — IPRATROPIUM BROMIDE AND ALBUTEROL SULFATE 1 AMPULE: .5; 2.5 SOLUTION RESPIRATORY (INHALATION) at 08:15

## 2023-01-15 RX ADMIN — IPRATROPIUM BROMIDE AND ALBUTEROL SULFATE 1 AMPULE: .5; 2.5 SOLUTION RESPIRATORY (INHALATION) at 13:01

## 2023-01-15 RX ADMIN — METHYLPREDNISOLONE SODIUM SUCCINATE 40 MG: 40 INJECTION INTRAMUSCULAR; INTRAVENOUS at 08:12

## 2023-01-15 RX ADMIN — Medication 10 ML: at 19:46

## 2023-01-15 RX ADMIN — SULFASALAZINE 500 MG: 500 TABLET ORAL at 19:45

## 2023-01-15 NOTE — CONSULTS
[facility]  [address] [facility], [address] [place] [address], [address] [place] [address]    Pulmonary Consultation      Reason for Consult:  sob    Requesting Physician:  Escobar Lazar MD    CHIEF COMPLAINT:  sob    History Obtained From:  patient    HISTORY OF PRESENT ILLNESS:                The patient is a 67 y.o. female who presents with increased sob and cough for the past 3 weeks. Claims has been having problems with breathing for the past year, on-off, (?) started happening more frequently over the past 2-3 weeks until the past day when got much worse. Currently on 2 li NC, doing some better with breathing as ambulating better in room, less cough though still with sputum production. Claims usually on Trelegy daily over the past year which has helped, (?) claritin which helps with nasal congestion and breathing, albuterol mdi as needed with increased use the past few weeks.     Past Medical History:        Diagnosis Date    Asthma     Chronic renal disease, stage III [facility]) [[id number]] 7/18/2022    Depression     Hyperlipidemia     Osteoarthritis     Seizures (HCC)        Past Surgical History:        Procedure Laterality Date    HYSTERECTOMY (CERVIX STATUS UNKNOWN)      TONSILLECTOMY         Current Medications:    Current Facility-Administered Medications: atorvastatin (LIPITOR) tablet 20 mg, 20 mg, Oral, Daily  celecoxib (CELEBREX) capsule 200 mg, 200 mg, Oral, BID  escitalopram (LEXAPRO) tablet 10 mg, 10 mg, Oral, Daily  sulfaSALAzine (AZULFIDINE) tablet 500 mg, 500 mg, Oral, BID  sodium chloride flush 0.9 % injection 5-40 mL, 5-40 mL, IntraVENous, 2 times per day  sodium chloride flush 0.9 % injection 5-40 mL, 5-40 mL, IntraVENous, PRN  0.9 % sodium chloride infusion, , IntraVENous, PRN  ondansetron (ZOFRAN-ODT) disintegrating tablet 4 mg, 4 mg, Oral, Q8H PRN **OR** ondansetron (ZOFRAN) injection 4 mg, 4 mg, IntraVENous, Q6H PRN  polyethylene glycol (GLYCOLAX) packet 17 g, 17 g, Oral, Daily PRN  enoxaparin (LOVENOX) injection 40 mg, 40 mg, SubCUTAneous, Daily  acetaminophen (TYLENOL) tablet 650 mg, 650 mg, Oral, Q6H PRN **OR** acetaminophen (TYLENOL) suppository 650 mg, 650 mg, Rectal, Q6H PRN  ipratropium-albuterol (DUONEB) nebulizer solution 1 ampule, 1 ampule, Inhalation, Q4H WA  methylPREDNISolone sodium (SOLU-MEDROL) injection 40 mg, 40 mg, IntraVENous, Q8H **FOLLOWED BY** [START ON 1/17/2023] predniSONE (DELTASONE) tablet 40 mg, 40 mg, Oral, Daily  levoFLOXacin (LEVAQUIN) tablet 750 mg, 750 mg, Oral, Daily  oxyCODONE (ROXICODONE) immediate release tablet 5 mg, 5 mg, Oral, Q4H PRN **OR** oxyCODONE (ROXICODONE) immediate release tablet 10 mg, 10 mg, Oral, Q4H PRN  morphine (PF) injection 2 mg, 2 mg, IntraVENous, Q2H PRN **OR** morphine sulfate (PF) injection 4 mg, 4 mg, IntraVENous, Q2H PRN  nicotine (NICODERM CQ) 14 MG/24HR 1 patch, 1 patch, TransDERmal, Daily  budesonide (PULMICORT) nebulizer suspension 250 mcg, 250 mcg, Nebulization, BID  arformoterol tartrate (BROVANA) nebulizer solution 15 mcg, 15 mcg, Nebulization, BID    Allergies:  Penicillins    Social History:    TOBACCO:   reports that she has been smoking cigarettes. She started smoking about 58 years ago. She has a 57.00 pack-year smoking history. She uses smokeless tobacco.    Family History:   No family history on file. REVIEW OF SYSTEMS:    RESPIRATORY:  sob and cough  Remainder of complete ROS is negative.     PHYSICAL EXAM:      Vitals:    /66   Pulse 69   Temp 97.5 °F (36.4 °C) (Oral)   Resp 18   Ht 5' (1.524 m)   Wt 202 lb 6.4 oz (91.8 kg)   SpO2 93%   BMI 39.53 kg/m²     EYES:  Lids and lashes normal, pupils equal, round and reactive to light, extra ocular muscles intact, sclera clear, conjunctiva normal  ENT:  Normocephalic, without obvious abnormality, atraumatic, sinuses nontender on palpation, external ears without lesions, oral pharynx with moist mucus membranes, tonsils without erythema or exudates, gums normal and good dentition. LUNGS:  bilateral ronchi with cough  CARDIOVASCULAR:  Normal apical impulse, regular rate and rhythm, normal S1 and S2, no S3 or S4, and no murmur noted  ABDOMEN:  No scars, normal bowel sounds, soft, non-distended, non-tender, no masses palpated, no hepatosplenomegally  MUSCULOSKELETAL:  There is no redness, warmth, or swelling of the joints. Full range of motion noted. NEUROLOGIC:  Awake, alert, oriented to name, place and time. Cranial nerves II-XII are grossly intact. DATA:    CBC:   Recent Labs     01/14/23  1009 01/15/23  0239   WBC 12.0* 10.3   HGB 15.1 14.2   HCT 46.1 44.2   MCV 92.8 91.3    240       BMP:  Recent Labs     01/14/23  1100 01/14/23  1608 01/15/23  0239    138 137   K 3.0* 5.1* 4.9   * 105 104   CO2 17* 23 23   BUN 16 18 21   CREATININE 0.6 0.8 0.8    ALB:3,BILIDIR:3,BILITOT:3,ALKPHOS:3)@    PT/INR: No results for input(s): PROTIME, INR in the last 72 hours. ABG:   Recent Labs     01/14/23  1004   PH 7.319*   PO2 112.8*   PCO2 39.2   HCO3 19.7*   BE -5.9*   O2SAT 98.0   METHB 0.3   O2HB 94.2   COHB 3.6*   O2CON 20.3   HHB 1.9   THB 15.2             Radiology Review:  CTA chest reviewed with (-) PE, small patchy opacity/atelectasis right lower lobe    IMPRESSION/RECOMMENDATIONS:      Dyspnea possible COPD  hypoxia    Cont with steroids, taper as tolerated  Cont with nebs, should be able to resume usual medications as out-pt  Cont with oxygen, taper as tolerated, will need to qualify for home use if still desaturating lm with activity  Viral and infectious titers (-), procalcitonin normal, may be able to stop antibiotics if continues to do ok  Need to work on smoking cessation  Possible discharge tomorrow if remains stable      Time at the bedside, reviewing labs and radiographs, reviewing notes and consultations, discussing with staff and family was more than 55 minutes.     Thanks for letting us see this patient in consultation. Please contact us with any questions. Office [phone] or after hours through [employer], [phone].

## 2023-01-15 NOTE — PROGRESS NOTES
[facility] Progress Note    Admitting Date and Time: 1/14/2023  9:31 AM  Admit Dx: COPD exacerbation ([facility] [J44.1]    Subjective:  Patient is being followed for [facility] exacerbation ([facility] [J44.1]   Pt feels so much better wants to go home. Advised too soon. Sees Dr. Guy Lopez. Order placed for Pulmonary. CT of chest to rule out PE. Continue other meds. Per RN: as above. ROS: denies fever, chills, cp, sob, n/v, HA unless stated above.       atorvastatin  20 mg Oral Daily    celecoxib  200 mg Oral BID    escitalopram  10 mg Oral Daily    sulfaSALAzine  500 mg Oral BID    sodium chloride flush  5-40 mL IntraVENous 2 times per day    enoxaparin  40 mg SubCUTAneous Daily    ipratropium-albuterol  1 ampule Inhalation Q4H WA    methylPREDNISolone  40 mg IntraVENous Q8H    Followed by    Aric Cortez ON 1/17/2023] predniSONE  40 mg Oral Daily    levoFLOXacin  750 mg Oral Daily    nicotine  1 patch TransDERmal Daily    budesonide  250 mcg Nebulization BID    arformoterol tartrate  15 mcg Nebulization BID     sodium chloride flush, 5-40 mL, PRN  sodium chloride, , PRN  ondansetron, 4 mg, Q8H PRN   Or  ondansetron, 4 mg, Q6H PRN  polyethylene glycol, 17 g, Daily PRN  acetaminophen, 650 mg, Q6H PRN   Or  acetaminophen, 650 mg, Q6H PRN  oxyCODONE, 5 mg, Q4H PRN   Or  oxyCODONE, 10 mg, Q4H PRN  morphine, 2 mg, Q2H PRN   Or  morphine, 4 mg, Q2H PRN         Objective:    /66   Pulse 69   Temp 97.5 °F (36.4 °C) (Oral)   Resp 18   Ht 5' (1.524 m)   Wt 202 lb 6.4 oz (91.8 kg)   SpO2 93%   BMI 39.53 kg/m²     General Appearance: alert and oriented to person, place and time and in no acute distress  Skin: warm and dry  Head: normocephalic and atraumatic  Eyes: pupils equal, round, and reactive to light, extraocular eye movements intact, conjunctivae normal  Neck: neck supple and non tender without mass   Pulmonary/Chest: end expiratory wheezes bilaterally, rales or rhonchi, normal air movement, no respiratory distress  Cardiovascular: normal rate, normal S1 and S2 and no carotid bruits  Abdomen: soft, non-tender, non-distended, normal bowel sounds, no masses or organomegaly  Extremities: no cyanosis, no clubbing and no edema  Neurologic: no cranial nerve deficit and speech normal        Recent Labs     01/14/23  1100 01/14/23  1608 01/15/23  0239    138 137   K 3.0* 5.1* 4.9   * 105 104   CO2 17* 23 23   BUN 16 18 21   CREATININE 0.6 0.8 0.8   GLUCOSE 171* 125* 109*   CALCIUM 6.3* 9.1 9.1       Recent Labs     01/14/23  1009 01/15/23  0239   WBC 12.0* 10.3   RBC 4.97 4.84   HGB 15.1 14.2   HCT 46.1 44.2   MCV 92.8 91.3   MCH 30.4 29.3   MCHC 32.8 32.1   RDW 15.1* 14.6    240   MPV 11.7 10.6       Radiology:   XR CHEST PORTABLE    Result Date: 1/14/2023  EXAMINATION: ONE XRAY VIEW OF THE CHEST 1/14/2023 10:06 am COMPARISON: None. HISTORY: ORDERING SYSTEM PROVIDED HISTORY: SOB; concern for PNA vs COPD exacerbation vs pulmonary edema TECHNOLOGIST PROVIDED HISTORY: Reason for exam:->SOB; concern for PNA vs COPD exacerbation vs pulmonary edema FINDINGS: Lungs are normally expanded. Heart is normal size. No acute infiltrates, consolidates or pleural effusions are seen. There is no perihilar vascular congestion. Mediastinum appears unremarkable. There is no pneumothorax on the right or on the left. No acute cardiopulmonary process. Assessment:    Principal Problem:    COPD exacerbation (HCC)  Active Problems:    Severe chronic obstructive pulmonary disease (HCC)    Rheumatoid arthritis ([facility]    Hyperlipidemia    Depression    Tobacco use disorder  Resolved Problems:    * No resolved hospital problems. *      Plan:  1. COPD exacerbation - Continue nebs, steroids. Levaquin on board. CT of chest ordered. Pulmonary consulted. 2.   Tobacco Use disorder - continue Nicoderm patch. Avoidance/Abstinence advised. 3.   RA - supportive care. 4. HLP - continue statin.   5. Hypocalcemia , resolved - trend labs. Treat accordingly. NOTE: This report was transcribed using voice recognition software. Every effort was made to ensure accuracy; however, inadvertent computerized transcription errors may be present.     Electronically signed by Mila Abdul MD on 1/15/2023 at 11:03 AM

## 2023-01-16 ENCOUNTER — TELEPHONE (OUTPATIENT)
Dept: FAMILY MEDICINE CLINIC | Age: 73
End: 2023-01-16

## 2023-01-16 LAB
ANION GAP SERPL CALCULATED.3IONS-SCNC: 9 MMOL/L (ref 7–16)
BASOPHILS ABSOLUTE: 0.02 E9/L (ref 0–0.2)
BASOPHILS RELATIVE PERCENT: 0.1 % (ref 0–2)
BUN BLDV-MCNC: 30 MG/DL (ref 6–23)
CALCIUM SERPL-MCNC: 8.9 MG/DL (ref 8.6–10.2)
CHLORIDE BLD-SCNC: 104 MMOL/L (ref 98–107)
CO2: 25 MMOL/L (ref 22–29)
CREAT SERPL-MCNC: 1 MG/DL (ref 0.5–1)
EOSINOPHILS ABSOLUTE: 0 E9/L (ref 0.05–0.5)
EOSINOPHILS RELATIVE PERCENT: 0 % (ref 0–6)
GFR SERPL CREATININE-BSD FRML MDRD: 60 ML/MIN/1.73
GLUCOSE BLD-MCNC: 124 MG/DL (ref 74–99)
HCT VFR BLD CALC: 40.6 % (ref 34–48)
HEMOGLOBIN: 13.3 G/DL (ref 11.5–15.5)
IMMATURE GRANULOCYTES #: 0.07 E9/L
IMMATURE GRANULOCYTES %: 0.5 % (ref 0–5)
LYMPHOCYTES ABSOLUTE: 0.85 E9/L (ref 1.5–4)
LYMPHOCYTES RELATIVE PERCENT: 6.2 % (ref 20–42)
MCH RBC QN AUTO: 29.7 PG (ref 26–35)
MCHC RBC AUTO-ENTMCNC: 32.8 % (ref 32–34.5)
MCV RBC AUTO: 90.6 FL (ref 80–99.9)
MONOCYTES ABSOLUTE: 0.51 E9/L (ref 0.1–0.95)
MONOCYTES RELATIVE PERCENT: 3.7 % (ref 2–12)
NEUTROPHILS ABSOLUTE: 12.18 E9/L (ref 1.8–7.3)
NEUTROPHILS RELATIVE PERCENT: 89.5 % (ref 43–80)
PDW BLD-RTO: 14.8 FL (ref 11.5–15)
PLATELET # BLD: 233 E9/L (ref 130–450)
PMV BLD AUTO: 10.8 FL (ref 7–12)
POTASSIUM REFLEX MAGNESIUM: 4.8 MMOL/L (ref 3.5–5)
RBC # BLD: 4.48 E12/L (ref 3.5–5.5)
SODIUM BLD-SCNC: 138 MMOL/L (ref 132–146)
WBC # BLD: 13.6 E9/L (ref 4.5–11.5)

## 2023-01-16 PROCEDURE — 6360000002 HC RX W HCPCS: Performed by: FAMILY MEDICINE

## 2023-01-16 PROCEDURE — 6370000000 HC RX 637 (ALT 250 FOR IP): Performed by: FAMILY MEDICINE

## 2023-01-16 PROCEDURE — 2580000003 HC RX 258: Performed by: FAMILY MEDICINE

## 2023-01-16 PROCEDURE — 2060000000 HC ICU INTERMEDIATE R&B

## 2023-01-16 PROCEDURE — 85025 COMPLETE CBC W/AUTO DIFF WBC: CPT

## 2023-01-16 PROCEDURE — 94640 AIRWAY INHALATION TREATMENT: CPT

## 2023-01-16 PROCEDURE — 80048 BASIC METABOLIC PNL TOTAL CA: CPT

## 2023-01-16 PROCEDURE — 36415 COLL VENOUS BLD VENIPUNCTURE: CPT

## 2023-01-16 RX ADMIN — METHYLPREDNISOLONE SODIUM SUCCINATE 40 MG: 40 INJECTION INTRAMUSCULAR; INTRAVENOUS at 02:10

## 2023-01-16 RX ADMIN — SULFASALAZINE 500 MG: 500 TABLET ORAL at 08:09

## 2023-01-16 RX ADMIN — IPRATROPIUM BROMIDE AND ALBUTEROL SULFATE 1 AMPULE: .5; 2.5 SOLUTION RESPIRATORY (INHALATION) at 12:16

## 2023-01-16 RX ADMIN — BUDESONIDE 250 MCG: 0.25 SUSPENSION RESPIRATORY (INHALATION) at 19:35

## 2023-01-16 RX ADMIN — Medication 10 ML: at 08:09

## 2023-01-16 RX ADMIN — ARFORMOTEROL TARTRATE 15 MCG: 15 SOLUTION RESPIRATORY (INHALATION) at 19:35

## 2023-01-16 RX ADMIN — ATORVASTATIN CALCIUM 20 MG: 20 TABLET, FILM COATED ORAL at 08:07

## 2023-01-16 RX ADMIN — IPRATROPIUM BROMIDE AND ALBUTEROL SULFATE 1 AMPULE: .5; 2.5 SOLUTION RESPIRATORY (INHALATION) at 19:35

## 2023-01-16 RX ADMIN — IPRATROPIUM BROMIDE AND ALBUTEROL SULFATE 1 AMPULE: .5; 2.5 SOLUTION RESPIRATORY (INHALATION) at 08:31

## 2023-01-16 RX ADMIN — ARFORMOTEROL TARTRATE 15 MCG: 15 SOLUTION RESPIRATORY (INHALATION) at 08:31

## 2023-01-16 RX ADMIN — IPRATROPIUM BROMIDE AND ALBUTEROL SULFATE 1 AMPULE: .5; 2.5 SOLUTION RESPIRATORY (INHALATION) at 16:26

## 2023-01-16 RX ADMIN — BUDESONIDE 250 MCG: 0.25 SUSPENSION RESPIRATORY (INHALATION) at 08:31

## 2023-01-16 RX ADMIN — LEVOFLOXACIN 750 MG: 500 TABLET, FILM COATED ORAL at 08:06

## 2023-01-16 RX ADMIN — ESCITALOPRAM OXALATE 10 MG: 10 TABLET ORAL at 08:07

## 2023-01-16 RX ADMIN — METHYLPREDNISOLONE SODIUM SUCCINATE 40 MG: 40 INJECTION INTRAMUSCULAR; INTRAVENOUS at 08:06

## 2023-01-16 RX ADMIN — Medication 10 ML: at 20:47

## 2023-01-16 RX ADMIN — CELECOXIB 200 MG: 100 CAPSULE ORAL at 08:07

## 2023-01-16 RX ADMIN — ENOXAPARIN SODIUM 40 MG: 100 INJECTION SUBCUTANEOUS at 08:20

## 2023-01-16 ASSESSMENT — PAIN DESCRIPTION - DESCRIPTORS: DESCRIPTORS: DISCOMFORT;DULL

## 2023-01-16 ASSESSMENT — PAIN SCALES - GENERAL
PAINLEVEL_OUTOF10: 0

## 2023-01-16 ASSESSMENT — PAIN DESCRIPTION - LOCATION
LOCATION: GENERALIZED
LOCATION: GENERALIZED

## 2023-01-16 ASSESSMENT — PAIN - FUNCTIONAL ASSESSMENT: PAIN_FUNCTIONAL_ASSESSMENT: PREVENTS OR INTERFERES SOME ACTIVE ACTIVITIES AND ADLS

## 2023-01-16 NOTE — PROGRESS NOTES
Pulmonary Progress Note    Admit Date: 2023  Hospital day                               PCP: Yamile Camp MD    Chief Complaint (s):  Patient Active Problem List   Diagnosis    Rheumatoid arthritis ([address]    Hyperlipidemia    Depression    Asthma    Obesity, Class II, BMI 35-39.9, with comorbidity    Tobacco use disorder    Major depressive disorder, single episode, mild (HCC)    Severe chronic obstructive pulmonary disease (HCC)    COPD exacerbation (HCC)       Subjective:  Seen this p.m.,  is at the bedside. Pecola Stallion of the patient's illness, therapeutic alternatives and smoking cessation were discussed at length. Vitals:  VITALS:  BP (!) 127/58   Pulse 79   Temp 97.8 °F (36.6 °C) (Oral)   Resp 18   Ht 5' (1.524 m)   Wt 205 lb 0.4 oz (93 kg)   SpO2 97%   BMI 40.04 kg/m²     24HR INTAKE/OUTPUT:    No intake or output data in the 24 hours ending 23 1858    24HR PULSE OXIMETRY RANGE:    SpO2  Av %  Min: 92 %  Max: 98 %    Medications:  IV:   sodium chloride         Scheduled Meds:   atorvastatin  20 mg Oral Daily    celecoxib  200 mg Oral BID    escitalopram  10 mg Oral Daily    sulfaSALAzine  500 mg Oral BID    sodium chloride flush  5-40 mL IntraVENous 2 times per day    enoxaparin  40 mg SubCUTAneous Daily    ipratropium-albuterol  1 ampule Inhalation Q4H WA    [START ON 2023] predniSONE  40 mg Oral Daily    levoFLOXacin  750 mg Oral Daily    nicotine  1 patch TransDERmal Daily    budesonide  250 mcg Nebulization BID    arformoterol tartrate  15 mcg Nebulization BID       Diet:   ADULT DIET; Regular     EXAM:  General: No distress. Alert. Eyes: PERRL. No sclera icterus. No conjunctival injection. ENT: No discharge. Pharynx clear. Neck: Trachea midline. Normal thyroid. Resp: No accessory muscle use. Left lower lobe rales. Scattered wheezing. No rhonchi. CV: Regular rate. Regular rhythm. No murmur or rub. Abd: Non-tender. Non-distended.  No masses. No organomegaly. Normal bowel sounds. Skin: Warm and dry. No nodule on exposed extremities. No rash on exposed extremities. Ext: No cyanosis, clubbing, edema  Lymph: No cervical LAD. No supraclavicular LAD. M/S: No cyanosis. No joint deformity. No clubbing. Neuro: Awake. Follows commands. Positive pupils/gag/corneals. Normal pain response. Results:  CBC:   Recent Labs     01/14/23  1009 01/15/23  0239 01/16/23  0219   WBC 12.0* 10.3 13.6*   HGB 15.1 14.2 13.3   HCT 46.1 44.2 40.6   MCV 92.8 91.3 90.6    240 233     BMP:   Recent Labs     01/14/23  1608 01/15/23  0239 01/16/23  0219    137 138   K 5.1* 4.9 4.8    104 104   CO2 23 23 25   BUN 18 21 30*   CREATININE 0.8 0.8 1.0     LIVER PROFILE:   Recent Labs     01/14/23  1100 01/14/23  1608   AST 16 21   ALT 10 16   BILITOT 0.2 0.3   ALKPHOS 51 79     PT/INR: No results for input(s): PROTIME, INR in the last 72 hours. APTT: No results for input(s): APTT in the last 72 hours. Pathology:  N/A      Microbiology:  None    Recent ABG:   Recent Labs     01/14/23  1004   PH 7.319*   PO2 112.8*   PCO2 39.2   HCO3 19.7*   BE -5.9*   O2SAT 98.0   METHB 0.3   O2HB 94.2   COHB 3.6*   O2CON 20.3   HHB 1.9   THB 15.2             Recent Films:  CTA CHEST W CONTRAST   Final Result   No pulmonary embolism to the lobar level. The distal pulmonary arteries are   not well evaluated due to breathing motion artifact. Enlarged pulmonary   trunk suggests pulmonary hypertension, in the appropriate clinical setting. No thoracic aortic aneurysm or dissection. Pulmonary emphysema. XR CHEST PORTABLE   Final Result   Stable atherosclerotic disease, cardiomegaly, and coarsened interstitial   markings. No new cardiopulmonary pathology. XR CHEST PORTABLE   Final Result   No acute cardiopulmonary process. Assessment:  COPD, likely Gold class III/IV.   Pulmonary functions were done at [facility] per the patient but are not reflected in her record. As seen above, there has been substantial increase in emphysematous changes over the past 15 years with ongoing tobacco abuse. Rheumatoid arthritis: This is also a risk factor for the development of obstructive lung disease. The patient is followed longitudinally by rheumatology    Plan:  Likely discharge tomorrow. The patient will need medications for home nebulizer including albuterol/ipratropium to be used 3-4 times daily as well as budesonide, 0.5 mg, twice daily. Check need for supplemental oxygen upon discharge  Smoking cessation was discussed at length including pharmacologic alternatives to facilitate quitting. The patient would like to try Chantix again now with a better understanding of mechanism of action  Eventual outpatient pulmonary function testing and consideration for pulmonary rehabilitation. For now, hold Trelegy    Time at the bedside, reviewing labs and radiographs, reviewing updated notes and consultations, discussing with staff and family was more than 45 minutes. Please note that voice recognition technology was used in the preparation of this note and make therefore it may contain inadvertent transcription errors. If the patient is a COVID 19 isolation patient, the above physical exam reflects that of the examining physician for the day. Marlene Correa MD,  M.D., F.C.C.P.     [facility], [address] [place] [address], [address], [facility], [facility]  Office visits:  [place], [place], [facility]

## 2023-01-16 NOTE — TELEPHONE ENCOUNTER
----- Message from [employer] Staff sent at 1/16/2023 11:27 AM EST -----  Subject: Message to Provider    QUESTIONS  Information for Provider? Karen Monsivais called on 01/16 @ 11:22 am. ([facility]) She wants to let her PCP know that she was admitted to the   Hospital on Saturday for exhasperated SEVERE COPD. Her pulse will not   reach above 90 without oxygent, so now she is on continuous oxygen. She   has a bacterial infection in an unknown area. She is very anxious and   would like to be prescribed Welbutrin to treat it. Please call the   daughter Karen Monsivais back asa to discuss with her what she needs to do   next.  ---------------------------------------------------------------------------  --------------  [employer]  [phone]; OK to leave message on [employer],OK to respond with   electronic message via [employer] portal (only for patients who have   registered [employer] account)  ---------------------------------------------------------------------------  --------------  SCRIPT ANSWERS  Relationship to Patient? Other  Representative Name? Karen Monsivais (daughter)  Is the Representative on the appropriate HIPAA document in Epic?  Yes

## 2023-01-16 NOTE — PROGRESS NOTES
Physician Progress Note      Saturnino Kelly  CSN #:                  [id number]  :                       1950  ADMIT DATE:       2023 9:31 AM  DISCH DATE:  Bib Marquez  PROVIDER #:        Nenita Malagon MD          QUERY TEXT:    Dear Attending Physician,    Pt admitted with COPD Ex . Pt noted to have Respirations 18-28 ,\"    tachypnea. .. labored breathing\"  on admission per Nursing Flowsheet then   \"shallow breathing with dyspnea on exertion\"   and \"Accessory muscle usage and   respiratory distress present. \" per ED note. If possible, please document in   the progress notes and discharge summary if you are evaluating and/or   treating any of the following: The medical record reflects the following:  Risk Factors: COPD Ex, Emphysema ,tobacco use, Asthma  Clinical Indicators: Per ED,\". [place] Accessory muscle usage and respiratory   distress present . [place] Breath sounds: Decreased breath sounds and wheezing   present. [place] She is in acute distress. [place] \"  Per Nursing Flowsheet,\". .. tachypnea   labored breathing. ..shallow breathing with dyspnea on exertion. . \" Per   H/P,\". .. COPD Ex. [place] \" Resp 18-28  Treatment: O2 , steroids, nebs    Thank you,  Benja Burnette RN CCDS  Clinical Documentation Improvement Specialist  Options provided:  -- Acute respiratory failure with hypoxia  -- Other - I will add my own diagnosis  -- Disagree - Not applicable / Not valid  -- Disagree - Clinically unable to determine / Unknown  -- Refer to Clinical Documentation Reviewer    PROVIDER RESPONSE TEXT:    This patient is in acute respiratory failure with hypoxia. Query created by: Cristo Rebolledo on 2023 2:13 PM      Electronically signed by:   Nenita Malagon MD 2023 3:11 PM

## 2023-01-16 NOTE — PROGRESS NOTES
[facility] Progress Note    Admitting Date and Time: 1/14/2023  9:31 AM  Admit Dx: COPD exacerbation ([facility] [J44.1]    Subjective:  Patient is being [facility] [J[facility]]   Pt seen and examined this morning. No acute events overnight  In no acute distress. States she is feeling better today, saturating well on 2 L NC  Denies any acute complaints    ROS: denies fever, chills, cp, sob, n/v, HA unless stated above.       atorvastatin  20 mg Oral Daily    celecoxib  200 mg Oral BID    escitalopram  10 mg Oral Daily    sulfaSALAzine  500 mg Oral BID    sodium chloride flush  5-40 mL IntraVENous 2 times per day    enoxaparin  40 mg SubCUTAneous Daily    ipratropium-albuterol  1 ampule Inhalation Q4H WA    [START ON 1/17/2023] predniSONE  40 mg Oral Daily    levoFLOXacin  750 mg Oral Daily    nicotine  1 patch TransDERmal Daily    budesonide  250 mcg Nebulization BID    arformoterol tartrate  15 mcg Nebulization BID     sodium chloride flush, 5-40 mL, PRN  sodium chloride, , PRN  ondansetron, 4 mg, Q8H PRN   Or  ondansetron, 4 mg, Q6H PRN  polyethylene glycol, 17 g, Daily PRN  acetaminophen, 650 mg, Q6H PRN   Or  acetaminophen, 650 mg, Q6H PRN  oxyCODONE, 5 mg, Q4H PRN   Or  oxyCODONE, 10 mg, Q4H PRN  morphine, 2 mg, Q2H PRN   Or  morphine, 4 mg, Q2H PRN         Objective:    /64   Pulse 79   Temp 98.1 °F (36.7 °C) (Oral)   Resp 18   Ht 5' (1.524 m)   Wt 205 lb 0.4 oz (93 kg)   SpO2 92%   BMI 40.04 kg/m²     General Appearance: alert and oriented to person, place and time and in no acute distress  Skin: warm and dry  Head: normocephalic and atraumatic  Eyes: pupils equal, round, and reactive to light, extraocular eye movements intact, conjunctivae normal  Neck: neck supple and non tender without mass   Pulmonary/Chest: Bilateral wheezes and rhonchi  Cardiovascular: normal rate, normal S1 and S2 and no carotid bruits  Abdomen: soft, non-tender, non-distended, normal bowel sounds, no masses or organomegaly  Extremities: no cyanosis, no clubbing and no edema  Neurologic: no cranial nerve deficit and speech normal        Recent Labs     01/14/23  1608 01/15/23  0239 01/16/23  0219    137 138   K 5.1* 4.9 4.8    104 104   CO2 23 23 25   BUN 18 21 30*   CREATININE 0.8 0.8 1.0   GLUCOSE 125* 109* 124*   CALCIUM 9.1 9.1 8.9       Recent Labs     01/14/23  1009 01/15/23  0239 01/16/23 0219   WBC 12.0* 10.3 13.6*   RBC 4.97 4.84 4.48   HGB 15.1 14.2 13.3   HCT 46.1 44.2 40.6   MCV 92.8 91.3 90.6   MCH 30.4 29.3 29.7   MCHC 32.8 32.1 32.8   RDW 15.1* 14.6 14.8    240 233   MPV 11.7 10.6 10.8         Assessment and Plan:     Principal Problem:    COPD exacerbation (HCC)  Active Problems:    Severe chronic obstructive pulmonary disease (HCC)    Rheumatoid arthritis (HCC)    Hyperlipidemia    Depression    Tobacco use disorder  Resolved Problems:    * No resolved hospital problems. *      COPD exacerbation, continue breathing treatments, steroids, Levaquin, pulmonology following  Acute hypoxic respiratory failure, 2/2 #1, tachypnea/use of accessory muscle present on admission. Breathing comfortably today on 2 L NC, desaturated to mid 80s when off oxygen, would likely be discharged on home oxygen. Obtain ambulatory pulse ox prior to discharge, likely tomorrow  Tobacco use disorder, continue nicotine patch  RA, continue supportive care, sulfasalazine  Hyperlipidemia, continue Lipitor      Time spent reviewing chart, clinical exam, discussing case and answering questions with staff/consultants/patient/family aprox 40 mins. NOTE: This report was transcribed using voice recognition software. Every effort was made to ensure accuracy; however, inadvertent computerized transcription errors may be present.   Electronically signed by Star Corona MD on 1/16/2023 at 3:19 PM

## 2023-01-16 NOTE — CARE COORDINATION
Social work / Discharge Planning:         Social work met with patient for initial assessment. She is independent at home. No history of HC or JOCELYN. Patient has a home nebulizer. Currently qualifies for home oxygen. No preference for DME provider. Tentative referral made to [address]. Patient will need walking pulse ox testing when discharge is anticipated. The Plan for Transition of Care is related to the following treatment goals: home oxygen    The Patient and/or patient representative patient was provided with a choice of provider and agrees   with the discharge plan. [x] Yes [] No    Freedom of choice list was provided with basic dialogue that supports the patient's individualized plan of care/goals, treatment preferences and shares the quality data associated with the providers.  [x] Yes [] No  Electronically signed by MIR Rowland on 1/16/2023 at 12:03 PM

## 2023-01-16 NOTE — PROGRESS NOTES
Patient awake, alert and oriented. Oxygen 2 liters nasal cannula on, turned off to room air. Patients pulse oximetry immediately decreased to 85-88% on room air. Oxygen 2 liters reapplied,pulse oximetry went up to 92-93%.

## 2023-01-16 NOTE — PROGRESS NOTES
P Quality Flow/Interdisciplinary Rounds Progress Note        Quality Flow Rounds held on January 16, 2023    Disciplines Attending:  Bedside Nurse, , , and Nursing Unit 15[address] Chasity Diaz was admitted on 1/14/2023  9:31 AM    Anticipated Discharge Date:       Disposition:    Remy Score:  Remy Scale Score: 23    Readmission Risk              Risk of Unplanned Readmission:  9           Discussed patient goal for the day, patient clinical progression, and barriers to discharge. The following Goal(s) of the Day/Commitment(s) have been identified:   Continue PO levaquin, IV solumedrol Q8, pulse ox testing, and discharge planning home.       Bud Suárez RN  January 16, 2023

## 2023-01-17 VITALS
TEMPERATURE: 98.4 F | HEART RATE: 70 BPM | DIASTOLIC BLOOD PRESSURE: 64 MMHG | WEIGHT: 204.81 LBS | HEIGHT: 60 IN | RESPIRATION RATE: 22 BRPM | SYSTOLIC BLOOD PRESSURE: 120 MMHG | OXYGEN SATURATION: 95 % | BODY MASS INDEX: 40.21 KG/M2

## 2023-01-17 LAB
ANION GAP SERPL CALCULATED.3IONS-SCNC: 8 MMOL/L (ref 7–16)
BASOPHILS ABSOLUTE: 0.01 E9/L (ref 0–0.2)
BASOPHILS RELATIVE PERCENT: 0.1 % (ref 0–2)
BUN BLDV-MCNC: 29 MG/DL (ref 6–23)
CALCIUM SERPL-MCNC: 9 MG/DL (ref 8.6–10.2)
CHLORIDE BLD-SCNC: 104 MMOL/L (ref 98–107)
CO2: 27 MMOL/L (ref 22–29)
CREAT SERPL-MCNC: 1.1 MG/DL (ref 0.5–1)
EOSINOPHILS ABSOLUTE: 0.01 E9/L (ref 0.05–0.5)
EOSINOPHILS RELATIVE PERCENT: 0.1 % (ref 0–6)
GFR SERPL CREATININE-BSD FRML MDRD: 53 ML/MIN/1.73
GLUCOSE BLD-MCNC: 84 MG/DL (ref 74–99)
HCT VFR BLD CALC: 43.3 % (ref 34–48)
HEMOGLOBIN: 13.6 G/DL (ref 11.5–15.5)
IMMATURE GRANULOCYTES #: 0.06 E9/L
IMMATURE GRANULOCYTES %: 0.6 % (ref 0–5)
LYMPHOCYTES ABSOLUTE: 2.17 E9/L (ref 1.5–4)
LYMPHOCYTES RELATIVE PERCENT: 20.2 % (ref 20–42)
MCH RBC QN AUTO: 29.2 PG (ref 26–35)
MCHC RBC AUTO-ENTMCNC: 31.4 % (ref 32–34.5)
MCV RBC AUTO: 93.1 FL (ref 80–99.9)
MONOCYTES ABSOLUTE: 0.79 E9/L (ref 0.1–0.95)
MONOCYTES RELATIVE PERCENT: 7.4 % (ref 2–12)
NEUTROPHILS ABSOLUTE: 7.7 E9/L (ref 1.8–7.3)
NEUTROPHILS RELATIVE PERCENT: 71.6 % (ref 43–80)
PDW BLD-RTO: 15.2 FL (ref 11.5–15)
PLATELET # BLD: 222 E9/L (ref 130–450)
PMV BLD AUTO: 10.7 FL (ref 7–12)
POTASSIUM REFLEX MAGNESIUM: 4.4 MMOL/L (ref 3.5–5)
RBC # BLD: 4.65 E12/L (ref 3.5–5.5)
SODIUM BLD-SCNC: 139 MMOL/L (ref 132–146)
WBC # BLD: 10.7 E9/L (ref 4.5–11.5)

## 2023-01-17 PROCEDURE — 85025 COMPLETE CBC W/AUTO DIFF WBC: CPT

## 2023-01-17 PROCEDURE — 2580000003 HC RX 258: Performed by: FAMILY MEDICINE

## 2023-01-17 PROCEDURE — 36415 COLL VENOUS BLD VENIPUNCTURE: CPT

## 2023-01-17 PROCEDURE — 6360000002 HC RX W HCPCS: Performed by: FAMILY MEDICINE

## 2023-01-17 PROCEDURE — 6370000000 HC RX 637 (ALT 250 FOR IP): Performed by: FAMILY MEDICINE

## 2023-01-17 PROCEDURE — 94640 AIRWAY INHALATION TREATMENT: CPT

## 2023-01-17 PROCEDURE — 6370000000 HC RX 637 (ALT 250 FOR IP): Performed by: INTERNAL MEDICINE

## 2023-01-17 PROCEDURE — 80048 BASIC METABOLIC PNL TOTAL CA: CPT

## 2023-01-17 RX ORDER — BUDESONIDE 0.25 MG/2ML
500 INHALANT ORAL 2 TIMES DAILY
Qty: 60 EACH | Refills: 3 | Status: SHIPPED | OUTPATIENT
Start: 2023-01-17

## 2023-01-17 RX ORDER — VARENICLINE TARTRATE 0.5 MG/1
.5-1 TABLET, FILM COATED ORAL SEE ADMIN INSTRUCTIONS
Qty: 57 TABLET | Refills: 0 | Status: SHIPPED | OUTPATIENT
Start: 2023-01-17

## 2023-01-17 RX ORDER — IPRATROPIUM BROMIDE AND ALBUTEROL SULFATE 2.5; .5 MG/3ML; MG/3ML
3 SOLUTION RESPIRATORY (INHALATION)
Qty: 360 ML | Refills: 3 | Status: SHIPPED | OUTPATIENT
Start: 2023-01-17

## 2023-01-17 RX ORDER — FLUTICASONE PROPIONATE 50 MCG
2 SPRAY, SUSPENSION (ML) NASAL DAILY
Status: DISCONTINUED | OUTPATIENT
Start: 2023-01-17 | End: 2023-01-17 | Stop reason: HOSPADM

## 2023-01-17 RX ORDER — PREDNISONE 20 MG/1
TABLET ORAL
Qty: 20 TABLET | Refills: 0 | Status: SHIPPED | OUTPATIENT
Start: 2023-01-17 | End: 2023-01-28

## 2023-01-17 RX ADMIN — CELECOXIB 200 MG: 100 CAPSULE ORAL at 10:15

## 2023-01-17 RX ADMIN — FLUTICASONE PROPIONATE 2 SPRAY: 50 SPRAY, METERED NASAL at 12:39

## 2023-01-17 RX ADMIN — IPRATROPIUM BROMIDE AND ALBUTEROL SULFATE 1 AMPULE: .5; 2.5 SOLUTION RESPIRATORY (INHALATION) at 15:40

## 2023-01-17 RX ADMIN — BUDESONIDE 250 MCG: 0.25 SUSPENSION RESPIRATORY (INHALATION) at 07:43

## 2023-01-17 RX ADMIN — PREDNISONE 40 MG: 20 TABLET ORAL at 10:16

## 2023-01-17 RX ADMIN — ESCITALOPRAM OXALATE 10 MG: 10 TABLET ORAL at 10:16

## 2023-01-17 RX ADMIN — IPRATROPIUM BROMIDE AND ALBUTEROL SULFATE 1 AMPULE: .5; 2.5 SOLUTION RESPIRATORY (INHALATION) at 07:43

## 2023-01-17 RX ADMIN — SULFASALAZINE 500 MG: 500 TABLET ORAL at 10:17

## 2023-01-17 RX ADMIN — ENOXAPARIN SODIUM 40 MG: 100 INJECTION SUBCUTANEOUS at 10:14

## 2023-01-17 RX ADMIN — ARFORMOTEROL TARTRATE 15 MCG: 15 SOLUTION RESPIRATORY (INHALATION) at 07:43

## 2023-01-17 RX ADMIN — ATORVASTATIN CALCIUM 20 MG: 20 TABLET, FILM COATED ORAL at 10:17

## 2023-01-17 RX ADMIN — IPRATROPIUM BROMIDE AND ALBUTEROL SULFATE 1 AMPULE: .5; 2.5 SOLUTION RESPIRATORY (INHALATION) at 12:10

## 2023-01-17 RX ADMIN — LEVOFLOXACIN 750 MG: 500 TABLET, FILM COATED ORAL at 10:14

## 2023-01-17 RX ADMIN — Medication 10 ML: at 10:18

## 2023-01-17 ASSESSMENT — PAIN SCALES - GENERAL: PAINLEVEL_OUTOF10: 0

## 2023-01-17 NOTE — PROGRESS NOTES
RT Evaluation for Home Oxygen    Resting (Room Air):  SpO2:  88  HR:  98    Resting (On O2):  SpO2:   92  HR:  82  O2 Device:  NC  O2 Flow Rate (L/min): 2   Comments:      During Walk (Room Air):    N/A (see comment below)    During Walk (On O2):  SpO2:  86  HR:  116  O2 Device:  NC  O2 Flow Rate (L/min):  2 (oxygen increased)  O2 Flow Rate:  3 (oxygen increased)  O2 Saturation: SAO2 84%      O2 Flow Rate: 4   O2 Saturation:  SAO2 92%    OWalk/Assistance Device:  none  Rate of Dyspnea: 42   Symptoms:  increased dyspnea with ambulation that does not quickly resolve at  rest.  Comments:      After Walk:  SpO2:  95%  HR:  77  O2 Device:  NC  O2 Flow Rate (L/min): 4   Rate of Dyspnea:  24  Does the Patient Qualify for Home O2:    Liter Flow at Rest: 2   Liter Flow on Exertion:  4  Does the Patient Need Portable Oxygen Tanks:         Additional Comments: SAO2 on RA not tested during activity d/t pt required oxygen at rest.    Electronically signed by Loly Dawson RN on 1/17/2023 at 11:20 AM

## 2023-01-17 NOTE — CARE COORDINATION
Social work / Discharge Planning:          Social work updated [employer] liaison that pulse ox testing and DME order is on the chart. Patient will need to wait for delivery of portable oxygen for discharge.     Electronically signed by MIR Rodriguez on 1/17/2023 at 11:49 AM

## 2023-01-17 NOTE — DISCHARGE INSTRUCTIONS
[facility] ([facility]) hospitalist group    Take prednisone taper as prescribed starting tomorrow  Take Chantix as prescribed  Breathing treatments as prescribed  Please, make follow-up appointments with your PCP in 1 week and then pulmonology in 2 weeks

## 2023-01-17 NOTE — PROGRESS NOTES
Pulse ox was 88% on room air at rest.  Oxygen applied. Recovery pulse ox was 95% on 4 liters of oxygen while ambulating.      Karina Whitney BSN, RN  [facility]

## 2023-01-17 NOTE — DISCHARGE SUMMARY
[facility] Physician Discharge Summary       Pj Medley MD  [address], [address] [address] 21 06    Follow up in 1 week(s)  hospital follow up    Ceci Patino MD  [address]  [address]  [address] [facility] [phone]    Schedule an appointment as soon as possible for a visit in 2 week(s)  hospital follow up      Activity level: As tolerated     Dispo: home      Condition on discharge: Stable     Patient ID:  Edd Edge  [id number]  47 y.o.  1950    Admit date: 1/14/2023    Discharge date and time:  1/17/2023  12:40 PM    Admission Diagnoses: Principal Problem:    COPD exacerbation ([address]  Active Problems:    Severe chronic obstructive pulmonary disease ([address]    Rheumatoid arthritis ([address]    Hyperlipidemia    Depression    Tobacco use disorder  Resolved Problems:    * No resolved hospital problems. *      Discharge Diagnoses: Principal Problem:    COPD exacerbation ([address]  Active Problems:    Severe chronic obstructive pulmonary disease (HCC)    Rheumatoid arthritis ([address]    Hyperlipidemia    Depression    Tobacco use disorder  Resolved Problems:    * No resolved hospital problems. *      Consults:  IP CONSULT TO PULMONOLOGY    Procedures: none    Hospital Course:   Patient Edd Edge is a 67 y.o. presented with COPD exacerbation ([address] [J44.1]    She was being managed for COPD exacerbation. Pulmonology was following. She was placed on breathing treatments, Solu-Medrol followed by prednisone, as well as Levaquin. She was hypoxic on admission requiring 2 L NC, she desaturated to mid 80s when she was taken off oxygen. She required 4 L NC on ambulation. She continued to show improvement in her respiratory status and will be discharged home in stable condition with home oxygen. Breathing treatments per pulmonology. Steroid taper. Chantix. She is to follow-up with pulmonology in 2 weeks.     Discharge Exam:    General Appearance: alert and oriented to person, place and time and in no acute distress  Skin: warm and dry  Head: normocephalic and atraumatic  Eyes: pupils equal, round, and reactive to light, extraocular eye movements intact, conjunctivae normal  Neck: neck supple and non tender without mass   Pulmonary/Chest: mild wheezing bl   Cardiovascular: normal rate, normal S1 and S2 and no carotid bruits  Abdomen: soft, non-tender, non-distended, normal bowel sounds, no masses or organomegaly  Extremities: no cyanosis, no clubbing and no edema  Neurologic: no cranial nerve deficit and speech normal    No intake/output data recorded. No intake/output data recorded. LABS:  Recent Labs     01/15/23  0239 01/16/23  0219 01/17/23  0305    138 139   K 4.9 4.8 4.4    104 104   CO2 23 25 27   BUN 21 30* 29*   CREATININE 0.8 1.0 1.1*   GLUCOSE 109* 124* 84   CALCIUM 9.1 8.9 9.0       Recent Labs     01/15/23  0239 01/16/23  0219 01/17/23  0305   WBC 10.3 13.6* 10.7   RBC 4.84 4.48 4.65   HGB 14.2 13.3 13.6   HCT 44.2 40.6 43.3   MCV 91.3 90.6 93.1   MCH 29.3 29.7 29.2   MCHC 32.1 32.8 31.4*   RDW 14.6 14.8 15.2*    233 222   MPV 10.6 10.8 10.7       No results for input(s): POCGLU in the last 72 hours. Imaging:  XR CHEST PORTABLE    Result Date: 1/15/2023  EXAMINATION: ONE XRAY VIEW OF THE CHEST 1/15/2023 6:56 am COMPARISON: Chest series from January 14, 2023 HISTORY: ORDERING SYSTEM PROVIDED HISTORY: FINDINGS: Symmetric lung inflation. Mildly coarsened interstitial markings. Atherosclerotic disease and cardiomegaly. No new airspace disease, pleural effusions, or pneumothorax. Osseous and thoracic soft tissue structures demonstrate no acute findings. Stable atherosclerotic disease, cardiomegaly, and coarsened interstitial markings. No new cardiopulmonary pathology. XR CHEST PORTABLE    Result Date: 1/14/2023  EXAMINATION: ONE XRAY VIEW OF THE CHEST 1/14/2023 10:06 am COMPARISON: None.  HISTORY: ORDERING SYSTEM PROVIDED HISTORY: SOB; concern for PNA vs COPD exacerbation vs pulmonary edema TECHNOLOGIST PROVIDED HISTORY: Reason for exam:->SOB; concern for PNA vs COPD exacerbation vs pulmonary edema FINDINGS: Lungs are normally expanded. Heart is normal size. No acute infiltrates, consolidates or pleural effusions are seen. There is no perihilar vascular congestion. Mediastinum appears unremarkable. There is no pneumothorax on the right or on the left. No acute cardiopulmonary process. CTA CHEST W CONTRAST    Result Date: 1/15/2023  EXAMINATION: CTA OF THE CHEST 1/15/2023 8:59 am TECHNIQUE: CTA of the chest was performed after the administration of intravenous contrast.  Multiplanar reformatted images are provided for review. MIP images are provided for review. Automated exposure control, iterative reconstruction, and/or weight based adjustment of the mA/kV was utilized to reduce the radiation dose to as low as reasonably achievable. COMPARISON: CTA chest 04/14/2008 HISTORY: ORDERING SYSTEM PROVIDED HISTORY: dyspnea. COPD TECHNOLOGIST PROVIDED HISTORY: Reason for exam:->dyspnea. COPD FINDINGS: Pulmonary Arteries: No pulmonary embolism to the lobar level. The segmental and subsegmental arteries are not well evaluated due to breathing motion artifact. 3.2 cm diameter pulmonary trunk. Mediastinum: No thoracic aortic aneurysm or dissection. Atherosclerotic disease. The heart is not significantly enlarged. No pericardial effusion. Unremarkable thyroid and esophagus. Lungs/pleura: No pleural effusion or pneumothorax. Expiratory phase appearance of the trachea. Bands of linear atelectasis versus scarring. No pulmonary consolidation. Extensive pulmonary emphysema. Upper Abdomen: Atherosclerotic disease. Roughly 1 cm hepatic cyst.  Probable hepatic steatosis. Soft Tissues/Bones: Degenerative changes of the spine. Stable vertebral height loss. No pulmonary embolism to the lobar level.   The distal pulmonary arteries are not well evaluated due to breathing motion artifact. Enlarged pulmonary trunk suggests pulmonary hypertension, in the appropriate clinical setting. No thoracic aortic aneurysm or dissection. Pulmonary emphysema. Patient Instructions:      Medication List        START taking these medications      budesonide 0.25 MG/2ML nebulizer suspension  Commonly known as: Pulmicort  Take 4 mLs by nebulization 2 times daily     ipratropium-albuterol 0.5-2.5 (3) MG/3ML Soln nebulizer solution  Commonly known as: DUONEB  Inhale 3 mLs into the lungs every 4 hours (while awake)     predniSONE 20 MG tablet  Commonly known as: DELTASONE  Take 2 tablets by mouth daily for 2 days, THEN 1.5 tablets daily for 3 days, THEN 1 tablet daily for 3 days, THEN 0.5 tablets daily for 3 days. Start taking on: January 17, 2023     varenicline 0.5 MG tablet  Commonly known as: Chantix  Take 1-2 tablets by mouth See Admin Instructions 0.5mg DAILY for 3 days followed by 0.5mg TWICE DAILY for 4 days followed by 1mg TWICE DAILY            CHANGE how you take these medications      albuterol sulfate  (90 Base) MCG/ACT inhaler  Commonly known as: Ventolin HFA  Inhale 2 puffs into the lungs every 6 hours as needed for Wheezing  What changed: Another medication with the same name was removed. Continue taking this medication, and follow the directions you see here.             CONTINUE taking these medications      atorvastatin 20 MG tablet  Commonly known as: LIPITOR  Take 1 tablet by mouth daily     celecoxib 200 MG capsule  Commonly known as: CELEBREX  Take 1 capsule by mouth 2 times daily     escitalopram 10 MG tablet  Commonly known as: LEXAPRO  TAKE ONE TABLET BY MOUTH ONCE DAILY     sulfaSALAzine 500 MG EC tablet  Commonly known as: AZULFIDINE            STOP taking these medications      Trelegy Ellipta 200-62.5-25 MCG/ACT Aepb inhaler  Generic drug: fluticasone-umeclidin-vilant               Where to Get Your Medications These medications were sent to [address] [facility], [address] [address]      Phone: [phone]   budesonide 0.25 MG/2ML nebulizer suspension  ipratropium-albuterol 0.5-2.5 (3) MG/3ML Soln nebulizer solution  predniSONE 20 MG tablet  varenicline 0.5 MG tablet           Note that more than 30 minutes was spent in preparing discharge papers, discussing discharge with patient, medication review, etc.    Signed:  Electronically signed by Samantha Viramontes MD on 1/17/2023 at 12:40 PM

## 2023-01-17 NOTE — PROGRESS NOTES
P Quality Flow/Interdisciplinary Rounds Progress Note        Quality Flow Rounds held on January 17, 2023    Disciplines Attending:  Bedside Nurse, , , and Nursing Unit 1538 Chasity Diaz was admitted on 1/14/2023  9:31 AM    Anticipated Discharge Date:       Disposition:    Remy Score:  Remy Scale Score: 21    Readmission Risk              Risk of Unplanned Readmission:  10           Discussed patient goal for the day, patient clinical progression, and barriers to discharge. The following Goal(s) of the Day/Commitment(s) have been identified:   Pulse ox testing and discharge planning home today.       Des Chahal RN  January 17, 2023

## 2023-01-17 NOTE — PROGRESS NOTES
Discharge instructions explained to pt and  at bedside and copy given. Both verbalize understanding and are in agreement with dc plan to home. Home oxygen delivered to room and at bedside. Telemetry removed, sanitized, and returned to storage. Transport notified of need for w/c assist off unit.

## 2023-01-17 NOTE — PROGRESS NOTES
Pulmonary Progress Note    Admit Date: 2023  Hospital day                               PCP: Tiffany Huff MD    Chief Complaint (s):  Patient Active Problem List   Diagnosis    Rheumatoid arthritis (HCC)    Hyperlipidemia    Depression    Asthma    Obesity, Class II, BMI 35-39.9, with comorbidity    Tobacco use disorder    Major depressive disorder, single episode, mild (HCC)    Severe chronic obstructive pulmonary disease (HCC)    COPD exacerbation (HCC)       Subjective:  Patient seen this AM on 2L nasal cannula. Ready to be discharged.       Vitals:  VITALS:  /63   Pulse 78   Temp 98.1 °F (36.7 °C) (Oral)   Resp 22   Ht 5' (1.524 m)   Wt 204 lb 12.9 oz (92.9 kg)   SpO2 91%   BMI 40.00 kg/m²     24HR INTAKE/OUTPUT:    No intake or output data in the 24 hours ending 23 1155    24HR PULSE OXIMETRY RANGE:    SpO2  Av %  Min: 91 %  Max: 97 %    Medications:  IV:   sodium chloride         Scheduled Meds:   fluticasone  2 spray Each Nostril Daily    atorvastatin  20 mg Oral Daily    celecoxib  200 mg Oral BID    escitalopram  10 mg Oral Daily    sulfaSALAzine  500 mg Oral BID    sodium chloride flush  5-40 mL IntraVENous 2 times per day    enoxaparin  40 mg SubCUTAneous Daily    ipratropium-albuterol  1 ampule Inhalation Q4H WA    predniSONE  40 mg Oral Daily    levoFLOXacin  750 mg Oral Daily    nicotine  1 patch TransDERmal Daily    budesonide  250 mcg Nebulization BID    arformoterol tartrate  15 mcg Nebulization BID       Diet:   ADULT DIET; Regular     EXAM:  General: No distress. Alert.  Eyes: PERRL. No sclera icterus. No conjunctival injection.  ENT: No discharge. Pharynx clear.  Neck: Trachea midline. Normal thyroid.  Resp: No accessory muscle use. No rales.  No wheezing.  No rhonchi.   CV: Regular rate. Regular rhythm. No murmur or rub.   Abd: Non-tender. Non-distended. No masses. No organomegaly. Normal bowel sounds.   Skin: Warm and dry. No nodule on exposed  extremities. No rash on exposed extremities. Ext: No cyanosis, clubbing, edema  Lymph: No cervical LAD. No supraclavicular LAD. M/S: No cyanosis. No joint deformity. No clubbing. Neuro: Awake. Follows commands. Positive pupils/gag/corneals. Normal pain response. Results:  CBC:   Recent Labs     01/15/23  0239 01/16/23  0219 01/17/23  0305   WBC 10.3 13.6* 10.7   HGB 14.2 13.3 13.6   HCT 44.2 40.6 43.3   MCV 91.3 90.6 93.1    233 222       BMP:   Recent Labs     01/15/23  0239 01/16/23  0219 01/17/23  0305    138 139   K 4.9 4.8 4.4    104 104   CO2 23 25 27   BUN 21 30* 29*   CREATININE 0.8 1.0 1.1*       LIVER PROFILE:   Recent Labs     01/14/23  1608   AST 21   ALT 16   BILITOT 0.3   ALKPHOS 79       PT/INR: No results for input(s): PROTIME, INR in the last 72 hours. APTT: No results for input(s): APTT in the last 72 hours. Pathology:  N/A      Microbiology:  None    Recent ABG:   No results for input(s): PH, PO2, PCO2, HCO3, BE, O2SAT, METHB, O2HB, COHB, O2CON, HHB, THB in the last 72 hours. Recent Films:  CTA CHEST W CONTRAST   Final Result   No pulmonary embolism to the lobar level. The distal pulmonary arteries are   not well evaluated due to breathing motion artifact. Enlarged pulmonary   trunk suggests pulmonary hypertension, in the appropriate clinical setting. No thoracic aortic aneurysm or dissection. Pulmonary emphysema. XR CHEST PORTABLE   Final Result   Stable atherosclerotic disease, cardiomegaly, and coarsened interstitial   markings. No new cardiopulmonary pathology. XR CHEST PORTABLE   Final Result   No acute cardiopulmonary process. Assessment:  COPD, likely Gold class III/IV. Pulmonary functions were done at [facility] per the patient but are not reflected in her record.   As seen above, there has been substantial increase in emphysematous changes over the past 15 years with ongoing tobacco abuse.  Rheumatoid arthritis: This is also a risk factor for the development of obstructive lung disease. The patient is followed longitudinally by rheumatology  Hypoxia requiring supplemental home oxygen at 2 L nasal cannula. Plan:   Steroid taper for discharge. Continue Duoneb 3-4 times a day at home, Budesonide 0.5 mg BID via nebulizer at home. Med reconciliation complete. Smoking cessation. The patient would like to try Chantix again now with a better understanding of mechanism of action. Outpatient PFT  Possible referral to pulmonary rehab  Okay for discharge. Follow up in 2 weeks. Time at the bedside, reviewing labs and radiographs, reviewing updated notes and consultations, discussing with staff and family was more than 45 minutes. Please note that voice recognition technology was used in the preparation of this note and make therefore it may contain inadvertent transcription errors. If the patient is a COVID 19 isolation patient, the above physical exam reflects that of the examining physician for the day. CARLY Reyes NP    Attestation    The patient was seen and personally examined. Follow up in two seeks. I have discussed the case, including pertinent history and exam findings with the nurse practitioner. I have reviewed meds and pertinent labs and the key elements of the encounter have been performed by me. I agree with the assessment, plan and orders as documented by the nurse practitioner. Additions and corrections are reflected in the signed note. Severiano Smaller, MD,  M.LEANNE., F.C.C.P.    [facility], [address] [place] [address], [address] [place] [address]  Office visits:  [place], [facility]

## 2023-01-18 ENCOUNTER — CARE COORDINATION (OUTPATIENT)
Dept: CASE MANAGEMENT | Age: 73
End: 2023-01-18

## 2023-01-18 ENCOUNTER — TELEPHONE (OUTPATIENT)
Dept: FAMILY MEDICINE CLINIC | Age: 73
End: 2023-01-18

## 2023-01-18 NOTE — CARE COORDINATION
[facility] Care Transitions Initial Follow Up Call    Call within 2 business days of discharge: Yes    Care Transition Nurse contacted the family by telephone to perform post hospital discharge assessment. Verified name and  with family as identifiers. Provided introduction to self, and explanation of the Care Transition Nurse role. Patient: Chriss File Patient : 1950   MRN: [id number]  Reason for Admission: Acute respiratory failure with hypoxia and hypercapnia [facility])   Discharge Date: 23 RARS: Readmission Risk Score: 9.2      Last Discharge  [facility]       Date Complaint Diagnosis Description Type Department Provider    23 Shortness of Breath Acute respiratory failure with hypoxia and hypercapnia ([facility] . .. ED to Hosp-Admission (Discharged) (ADMITTED) ERNESTINE Yee MD; BLAINE Salazar. Received a return call from Elicia's  Bert Pruitt, confirmed on HIPAA, for initial low readmission risk score care transition call post hospital discharge. He reports that she is presently napping and has been \"breathing well\" this morning. He stated she is wearing 4L O2 during the day and wore 2L O2 at bedtime. Her pulse ox reading was 93% this morning. He denies complaints of SOB at rest or ARELLANO at this time. He confirmed that she has not smoked cigarettes since prior to admission and stated she threw away all of her cigarettes and began taking the Chantix today. He stated they have all of her medications and kindly declined a full med review at this time. Care Transition Nurse reviewed discharge instructions, medical action plan, and red flags with family who verbalized understanding. The family was given an opportunity to ask questions and does not have any further questions or concerns at this time. Were discharge instructions available to patient? Yes.  Reviewed appropriate site of care based on symptoms and resources available to patient including: PCP  Specialist  Urgent care clinics  [facility] 24/7  When to call [employer]. The family agrees to contact the PCP office for questions related to their healthcare. Medication reconciliation was not performed with family, however, he verbalizes understanding of administration of home medications. [id number] entered: no    Was patient discharged with a pulse oximeter? Already has one at home    Non-face-to-face services provided:  Scheduled appointment with PCP-unable to schedule due to provider availability, will route to Dr. Junior Nuñez office and request that they schedule within 7 days. Alexandra Carlotta stated they have open availability and he will drive Wendy Cavazos to her appt. Scheduled appointment with Andrea Alexis will call Dr. Ad Serna office to schedule. Obtained and reviewed discharge summary and/or continuity of care documents    Offered patient enrollment in the Remote Patient Monitoring (RPM) program for in-home monitoring:  Did not discuss at this time . Care Transitions 24 Hour Call    Schedule Follow Up Appointment with PCP: Completed  Do you have a copy of your discharge instructions?: Yes  Do you have all of your prescriptions and are they filled?: Yes  Have you been contacted by a [facility] Pharmacist?: No  Have you scheduled your follow up appointment?: No  Do you feel like you have everything you need to keep you well at home?: Yes  Care Transitions Interventions         Follow Up  Future Appointments   Date Time Provider Franco Heller   4/26/2023  2:30 PM Ezio Cifuentes MD [address] [place] [address]   10/30/2023  2:30 PM Ezio Cifuentes MD [address] Transition Nurse provided contact information. Plan for follow-up call in 5-7 days based on severity of symptoms and risk factors. Plan for next call: symptom management-SOB at rest? Pulse ox readings?   follow-up appointment-appts scheduled with PCP and pulm?      Michael Mckenna RN

## 2023-01-20 ENCOUNTER — OFFICE VISIT (OUTPATIENT)
Dept: FAMILY MEDICINE CLINIC | Age: 73
End: 2023-01-20

## 2023-01-20 VITALS
RESPIRATION RATE: 16 BRPM | BODY MASS INDEX: 40.37 KG/M2 | HEIGHT: 60 IN | SYSTOLIC BLOOD PRESSURE: 124 MMHG | OXYGEN SATURATION: 99 % | WEIGHT: 205.6 LBS | DIASTOLIC BLOOD PRESSURE: 66 MMHG | TEMPERATURE: 97.9 F | HEART RATE: 75 BPM

## 2023-01-20 DIAGNOSIS — J44.9 SEVERE CHRONIC OBSTRUCTIVE PULMONARY DISEASE (HCC): ICD-10-CM

## 2023-01-20 DIAGNOSIS — E66.01 OBESITY, CLASS III, BMI 40-49.9 (MORBID OBESITY) (HCC): ICD-10-CM

## 2023-01-20 DIAGNOSIS — F32.0 MAJOR DEPRESSIVE DISORDER, SINGLE EPISODE, MILD (HCC): ICD-10-CM

## 2023-01-20 DIAGNOSIS — M05.79 RHEUMATOID ARTHRITIS INVOLVING MULTIPLE SITES WITH POSITIVE RHEUMATOID FACTOR (HCC): ICD-10-CM

## 2023-01-20 DIAGNOSIS — F17.200 TOBACCO USE DISORDER: ICD-10-CM

## 2023-01-20 DIAGNOSIS — J44.1 COPD EXACERBATION (HCC): ICD-10-CM

## 2023-01-20 DIAGNOSIS — Z09 HOSPITAL DISCHARGE FOLLOW-UP: Primary | ICD-10-CM

## 2023-01-20 NOTE — PROGRESS NOTES
Post-Discharge Transitional Care  Follow Up      Siomara Rodas   YOB: 1950    Date of Office Visit:  1/20/2023  Date of Hospital Admission: 1/14/23  Date of Hospital Discharge: 1/17/23  Risk of hospital readmission (high >=14%. Medium >=10%) :Readmission Risk Score: 9.2      Care management risk score Rising risk (score 2-5) and Complex Care (Scores >=6): No Risk Score On File     Non face to face  following discharge, date last encounter closed (first attempt may have been earlier): 01/18/2023    Call initiated 2 business days of discharge: Yes    ASSESSMENT/PLAN:   Hospital discharge follow-up  -     KS DISCHARGE MEDS RECONCILED W/ CURRENT OUTPATIENT MED LIST  COPD exacerbation ([facility] [address]    On steroids and nebulizer  Obesity, Class III, BMI 40-49.9 (morbid obesity) ([facility] [address]      Wt Readings from Last 3 Encounters:   01/20/23 205 lb 9.6 oz (93.3 kg)   01/17/23 204 lb 12.9 oz (92.9 kg)   10/24/22 206 lb 3.2 oz (93.5 kg)    Unable to exercise will watch diet  Major depressive disorder, single episode, mild (HCC)     Worsening anxiety on lexapro  Stopping smoking and on chantix      Stable, cont meds recheck 6 mos    Nicotine patch ad steroids  Stable, cont meds recheck 6 mos    Monitor on present meds  Rheumatoid arthritis involving multiple sites with positive rheumatoid factor (HCC)on asulfadine    Follows with rheumatologist    Stable, cont meds recheck 6 mos    Severe chronic obstructive pulmonary disease ([facility] [address]    Was still smoking before admission     Has stopped now  Stable, cont meds recheck 6 mos    Tobacco use disorder    Medical Decision Making: high complexity  Return in 6 weeks (on 3/3/2023). Vandana kenny    Subjective:   HPI:  Follow up of Hospital problems/diagnosis(es): pt admidted for exacerbation of copd neg flu covid and rsp panel  Was seen by pulm started on nebulizer  duoneb, pulmicort and albeterol steroids  Neg resp panel  Cta chest neg for PE + emphysema, strp, legionell neg.  Emperic abx  Immunocompromised due to asulfadine, for RA  Inpatient rse: Discharge summary reviewed- see chart. Interval history/Current status: she is here with  and oxygen. He states her pOx has been good but they have not tried it off O@  She would like to bath but was tolld not to get the tubing wet. They will try to do this with a shower chair. 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 Staes 21 mg  Advised to decrease to 14 in 2 weeks then wean to 7 mg  See me in 6 weeks  She has stopped smoking   Some anxiety is on lexapro. Clarified nebulizer do not mix duo neb and pulmicort  Take pulmicort twice daily  All notes labs and medications reviewed from hospital and discussed with patient and     Patient Active Problem List   Diagnosis    Rheumatoid arthritis ([facility] [address]    Hyperlipidemia    Asthma    Tobacco use disorder    Major depressive disorder, single episode, mild (HCC)    Severe chronic obstructive pulmonary disease (HCC)    COPD exacerbation ([facility] [address]       Medications listed as ordered at the time of discharge from hospital     Medication List            Accurate as of January 20, 2023  6:47 PM. If you have any questions, ask your nurse or doctor.                 CHANGE how you take these medications      celecoxib 200 MG capsule  Commonly known as: CELEBREX  Take 1 capsule by mouth 2 times daily  What changed: when to take this            CONTINUE taking these medications      albuterol sulfate  (90 Base) MCG/ACT inhaler  Commonly known as: Ventolin HFA  Inhale 2 puffs into the lungs every 6 hours as needed for Wheezing     atorvastatin 20 MG tablet  Commonly known as: LIPITOR  Take 1 tablet by mouth daily     budesonide 0.25 MG/2ML nebulizer suspension  Commonly known as: Pulmicort  Take 4 mLs by nebulization 2 times daily     escitalopram 10 MG tablet  Commonly known as: LEXAPRO  TAKE ONE TABLET BY MOUTH ONCE DAILY     ipratropium-albuterol 0.5-2.5 (3) MG/3ML Soln nebulizer solution  Commonly known as: DUONEB  Inhale 3 mLs into the lungs every 4 hours (while awake)     predniSONE 20 MG tablet  Commonly known as: DELTASONE  Take 2 tablets by mouth daily for 2 days, THEN 1.5 tablets daily for 3 days, THEN 1 tablet daily for 3 days, THEN 0.5 tablets daily for 3 days. Start taking on: January 17, 2023     sulfaSALAzine 500 MG EC tablet  Commonly known as: AZULFIDINE     varenicline 0.5 MG tablet  Commonly known as: Chantix  Take 1-2 tablets by mouth See Admin Instructions 0.5mg DAILY for 3 days followed by 0.5mg TWICE DAILY for 4 days followed by 1mg TWICE DAILY                Medications marked \"taking\" at this time  Outpatient Medications Marked as Taking for the 1/20/23 encounter (Office Visit) with Denise Bazzi MD   Medication Sig Dispense Refill    ipratropium-albuterol (DUONEB) 0.5-2.5 (3) MG/3ML SOLN nebulizer solution Inhale 3 mLs into the lungs every 4 hours (while awake) 360 mL 3    budesonide (PULMICORT) 0.25 MG/2ML nebulizer suspension Take 4 mLs by nebulization 2 times daily 60 each 3    varenicline (CHANTIX) 0.5 MG tablet Take 1-2 tablets by mouth See Admin Instructions 0.5mg DAILY for 3 days followed by 0.5mg TWICE DAILY for 4 days followed by 1mg TWICE DAILY 57 tablet 0    predniSONE (DELTASONE) 20 MG tablet Take 2 tablets by mouth daily for 2 days, THEN 1.5 tablets daily for 3 days, THEN 1 tablet daily for 3 days, THEN 0.5 tablets daily for 3 days.  20 tablet 0    escitalopram (LEXAPRO) 10 MG tablet TAKE ONE TABLET BY MOUTH ONCE DAILY 90 tablet 1    albuterol sulfate HFA (VENTOLIN HFA) 108 (90 Base) MCG/ACT inhaler Inhale 2 puffs into the lungs every 6 hours as needed for Wheezing 18 g 5    atorvastatin (LIPITOR) 20 MG tablet Take 1 tablet by mouth daily 90 tablet 1    sulfaSALAzine (AZULFIDINE) 500 MG EC tablet Take 500 mg by mouth daily      celecoxib (CELEBREX) 200 MG capsule Take 1 capsule by mouth 2 times daily (Patient taking differently: Take 200 mg by mouth daily) 180 capsule 1        Medications patient taking as of now reconciled against medications ordered at time of hospital discharge: Yes    A comprehensive review of systems was negative except for what was noted in the HPI. Objective:    /66 (Site: Right Upper Arm)   Pulse 75   Temp 97.9 °F (36.6 °C)   Resp 16   Ht 5' (1.524 m)   Wt 205 lb 9.6 oz (93.3 kg)   SpO2 99%   BMI 40.15 kg/m²   General Appearance: alert and oriented to person, place and time, well developed and well- nourished , obese, has O2 with tank,  Skin: warm and dry, no rash or erythema  Head: normocephalic and atraumatic  Neck: supple and non-tender without mass, no thyromegaly or thyroid nodules, no cervical lymphadenopathy  Pulmonary/Chest: clear to auscultation bilaterally- no wheezes, rales or rhonchi, normal air movement, no respiratory distress  Cardiovascular: normal rate, regular rhythm, normal S1 and S2, no murmurs, rubs, clicks, or gallops, distal pulses intact, no carotid bruits  Abdomen: soft, non-tender, non-distended, normal bowel sounds, no masses or organomegaly  Extremities: no cyanosis, clubbing or edema  Musculoskeletal: normal range of motion, no joint swelling, deformity or tenderness      An electronic signature was used to authenticate this note.   --Escobar Lazar MD

## 2023-01-24 RX ORDER — LORATADINE 10 MG/1
10 TABLET ORAL DAILY
Qty: 90 TABLET | Refills: 1 | Status: SHIPPED | OUTPATIENT
Start: 2023-01-24

## 2023-01-24 NOTE — TELEPHONE ENCOUNTER
Patient called requesting a Rx for Claritin as it is helping with the head congestion.  [facility]

## 2023-01-25 ENCOUNTER — CARE COORDINATION (OUTPATIENT)
Dept: CASE MANAGEMENT | Age: 73
End: 2023-01-25

## 2023-01-25 NOTE — CARE COORDINATION
[facility] Care Transitions Follow Up Call    Patient: Teressa Gomez  Patient : 1950   MRN: [id number]  Reason for Admission: Acute respiratory failure with hypoxia and hypercapnia ([address]   new 4L o2   Discharge Date: 23 RARS: Readmission Risk Score: 9.2    Attempted to reach the patient for subsequent Care Transition call. Message left with CTN's contact information requesting return phone call.       Follow Up  Future Appointments   Date Time Provider Franco Heller   2023  2:15 PM CARLY Goodwin - NP [facility]   3/3/2023  2:30 PM Marisela Henao MD [facility]   2023  2:30 PM Marisela Henao MD [facility]   10/30/2023  2:30 PM Marisela Henao MD [facility]     Zan Mitchell RN

## 2023-02-01 ENCOUNTER — CARE COORDINATION (OUTPATIENT)
Dept: CASE MANAGEMENT | Age: 73
End: 2023-02-01

## 2023-02-01 NOTE — CARE COORDINATION
[facility] Care Transitions Follow Up Call    Patient: Soumya Belcher  Patient : 1950   MRN: [id number]  Reason for Admission: Acute respiratory failure with hypoxia and hypercapnia [facility])   Discharge Date: 23 RARS: Readmission Risk Score: 9.2    Final attempt to reach the patient for subsequent Care Transition call. Message left with CTN's contact information requesting return phone call. Signing off.    Follow Up  Future Appointments   Date Time Provider Franco Heller   2023  1:45 PM CARLY Hoffman - NP [facility]   3/3/2023  2:30 PM Clyde Godwin MD [facility]   2023  2:30 PM Clyde Godwin MD [facility]   10/30/2023  2:30 PM Clyde Godwin MD [facility]     Aria Zhao RN

## 2023-02-06 ENCOUNTER — TELEPHONE (OUTPATIENT)
Dept: FAMILY MEDICINE CLINIC | Age: 73
End: 2023-02-06

## 2023-02-06 ENCOUNTER — APPOINTMENT (OUTPATIENT)
Dept: GENERAL RADIOLOGY | Age: 73
DRG: 189 | End: 2023-02-06
Payer: MEDICARE

## 2023-02-06 ENCOUNTER — HOSPITAL ENCOUNTER (INPATIENT)
Age: 73
LOS: 6 days | Discharge: HOME HEALTH | DRG: 189 | End: 2023-02-13
Attending: EMERGENCY MEDICINE | Admitting: FAMILY MEDICINE
Payer: MEDICARE

## 2023-02-06 DIAGNOSIS — D72.829 LEUKOCYTOSIS, UNSPECIFIED TYPE: ICD-10-CM

## 2023-02-06 DIAGNOSIS — J44.1 COPD EXACERBATION (HCC): ICD-10-CM

## 2023-02-06 DIAGNOSIS — J96.21 ACUTE ON CHRONIC RESPIRATORY FAILURE WITH HYPOXIA AND HYPERCAPNIA (HCC): Primary | ICD-10-CM

## 2023-02-06 DIAGNOSIS — F41.0 PANIC ATTACKS: Primary | ICD-10-CM

## 2023-02-06 DIAGNOSIS — J96.22 ACUTE ON CHRONIC RESPIRATORY FAILURE WITH HYPOXIA AND HYPERCAPNIA (HCC): Primary | ICD-10-CM

## 2023-02-06 DIAGNOSIS — R79.89 D-DIMER, ELEVATED: ICD-10-CM

## 2023-02-06 DIAGNOSIS — R65.10 SIRS (SYSTEMIC INFLAMMATORY RESPONSE SYNDROME) (HCC): ICD-10-CM

## 2023-02-06 PROCEDURE — 96375 TX/PRO/DX INJ NEW DRUG ADDON: CPT

## 2023-02-06 PROCEDURE — 6370000000 HC RX 637 (ALT 250 FOR IP): Performed by: EMERGENCY MEDICINE

## 2023-02-06 PROCEDURE — 96374 THER/PROPH/DIAG INJ IV PUSH: CPT

## 2023-02-06 PROCEDURE — 94664 DEMO&/EVAL PT USE INHALER: CPT

## 2023-02-06 PROCEDURE — 94640 AIRWAY INHALATION TREATMENT: CPT

## 2023-02-06 PROCEDURE — 99285 EMERGENCY DEPT VISIT HI MDM: CPT

## 2023-02-06 RX ORDER — DESVENLAFAXINE 50 MG/1
50 TABLET, EXTENDED RELEASE ORAL DAILY
Qty: 30 TABLET | Refills: 3 | Status: ON HOLD
Start: 2023-02-06 | End: 2023-02-07 | Stop reason: ALTCHOICE

## 2023-02-06 RX ORDER — IPRATROPIUM BROMIDE AND ALBUTEROL SULFATE 2.5; .5 MG/3ML; MG/3ML
3 SOLUTION RESPIRATORY (INHALATION) ONCE
Status: COMPLETED | OUTPATIENT
Start: 2023-02-07 | End: 2023-02-06

## 2023-02-06 RX ORDER — LORAZEPAM 0.5 MG/1
0.5 TABLET ORAL 2 TIMES DAILY PRN
Qty: 60 TABLET | Refills: 0 | Status: ON HOLD | OUTPATIENT
Start: 2023-02-06 | End: 2023-03-08

## 2023-02-06 RX ORDER — IPRATROPIUM BROMIDE AND ALBUTEROL SULFATE 2.5; .5 MG/3ML; MG/3ML
1 SOLUTION RESPIRATORY (INHALATION) ONCE
Status: DISCONTINUED | OUTPATIENT
Start: 2023-02-07 | End: 2023-02-06

## 2023-02-06 RX ORDER — METHYLPREDNISOLONE SODIUM SUCCINATE 125 MG/2ML
125 INJECTION, POWDER, LYOPHILIZED, FOR SOLUTION INTRAMUSCULAR; INTRAVENOUS ONCE
Status: COMPLETED | OUTPATIENT
Start: 2023-02-07 | End: 2023-02-07

## 2023-02-06 RX ADMIN — IPRATROPIUM BROMIDE AND ALBUTEROL SULFATE 3 AMPULE: .5; 2.5 SOLUTION RESPIRATORY (INHALATION) at 23:55

## 2023-02-06 NOTE — TELEPHONE ENCOUNTER
[facility] called again; contrary to note in emr, insisted they hadn't heard from anyone at office. Asked Burton Romero, she had talked to them, said she is waiting to hear from dr. Haider Galo pt of this, still anxious to hear from someone.

## 2023-02-06 NOTE — TELEPHONE ENCOUNTER
Spoke with Marcin Morales and Vic Petersen and said that she has Anxiety because she has to take the \"steam\" into her mouth. Denies anxiety is from the mask itself.

## 2023-02-06 NOTE — TELEPHONE ENCOUNTER
Pt's spouse, Ezio Alberts, called today. Pt was admitted to [address] [place] [address] where she was on breathing treatments and has continued these treatments at home. Has been doing well, but has started having anxiety, and trouble doing treatments. Pulse ox good at 92/93.  Pls call Ray at [phone] to advise

## 2023-02-07 ENCOUNTER — TELEPHONE (OUTPATIENT)
Dept: OTHER | Facility: CLINIC | Age: 73
End: 2023-02-07

## 2023-02-07 ENCOUNTER — APPOINTMENT (OUTPATIENT)
Dept: CT IMAGING | Age: 73
DRG: 189 | End: 2023-02-07
Payer: MEDICARE

## 2023-02-07 ENCOUNTER — APPOINTMENT (OUTPATIENT)
Dept: GENERAL RADIOLOGY | Age: 73
DRG: 189 | End: 2023-02-07
Payer: MEDICARE

## 2023-02-07 PROBLEM — R06.89 HYPERCAPNIA: Status: ACTIVE | Noted: 2023-02-07

## 2023-02-07 LAB
ADENOVIRUS BY PCR: NOT DETECTED
ANION GAP SERPL CALCULATED.3IONS-SCNC: 12 MMOL/L (ref 7–16)
B.E.: -0.1 MMOL/L (ref -3–3)
B.E.: 1.6 MMOL/L (ref -3–3)
B.E.: 2.7 MMOL/L (ref -3–3)
BASOPHILS ABSOLUTE: 0.07 E9/L (ref 0–0.2)
BASOPHILS RELATIVE PERCENT: 0.5 % (ref 0–2)
BORDETELLA PARAPERTUSSIS BY PCR: NOT DETECTED
BORDETELLA PERTUSSIS BY PCR: NOT DETECTED
BUN BLDV-MCNC: 17 MG/DL (ref 6–23)
CALCIUM SERPL-MCNC: 9.4 MG/DL (ref 8.6–10.2)
CHLAMYDOPHILIA PNEUMONIAE BY PCR: NOT DETECTED
CHLORIDE BLD-SCNC: 99 MMOL/L (ref 98–107)
CO2: 27 MMOL/L (ref 22–29)
COHB: 1.1 % (ref 0–1.5)
COHB: 1.2 % (ref 0–1.5)
COHB: 1.6 % (ref 0–1.5)
CORONAVIRUS 229E BY PCR: NOT DETECTED
CORONAVIRUS HKU1 BY PCR: NOT DETECTED
CORONAVIRUS NL63 BY PCR: NOT DETECTED
CORONAVIRUS OC43 BY PCR: NOT DETECTED
CREAT SERPL-MCNC: 0.7 MG/DL (ref 0.5–1)
CRITICAL: ABNORMAL
D DIMER: 598 NG/ML DDU
DATE ANALYZED: ABNORMAL
DATE OF COLLECTION: ABNORMAL
EKG ATRIAL RATE: 101 BPM
EKG P AXIS: 75 DEGREES
EKG P-R INTERVAL: 160 MS
EKG Q-T INTERVAL: 336 MS
EKG QRS DURATION: 70 MS
EKG QTC CALCULATION (BAZETT): 435 MS
EKG R AXIS: -43 DEGREES
EKG T AXIS: 52 DEGREES
EKG VENTRICULAR RATE: 101 BPM
EOSINOPHILS ABSOLUTE: 0.54 E9/L (ref 0.05–0.5)
EOSINOPHILS RELATIVE PERCENT: 3.8 % (ref 0–6)
FIO2: 40 %
FIO2: 50 %
GFR SERPL CREATININE-BSD FRML MDRD: >60 ML/MIN/1.73
GLUCOSE BLD-MCNC: 140 MG/DL (ref 74–99)
HCO3: 28.1 MMOL/L (ref 22–26)
HCO3: 31.6 MMOL/L (ref 22–26)
HCO3: 33.8 MMOL/L (ref 22–26)
HCT VFR BLD CALC: 46.7 % (ref 34–48)
HEMOGLOBIN: 14.5 G/DL (ref 11.5–15.5)
HHB: 1.3 % (ref 0–5)
HHB: 2.1 % (ref 0–5)
HHB: 2.3 % (ref 0–5)
HUMAN METAPNEUMOVIRUS BY PCR: NOT DETECTED
HUMAN RHINOVIRUS/ENTEROVIRUS BY PCR: NOT DETECTED
IMMATURE GRANULOCYTES #: 0.05 E9/L
IMMATURE GRANULOCYTES %: 0.4 % (ref 0–5)
INFLUENZA A BY PCR: NOT DETECTED
INFLUENZA A BY PCR: NOT DETECTED
INFLUENZA B BY PCR: NOT DETECTED
INFLUENZA B BY PCR: NOT DETECTED
LAB: ABNORMAL
LACTIC ACID, SEPSIS: 1.1 MMOL/L (ref 0.5–1.9)
LACTIC ACID, SEPSIS: 1.4 MMOL/L (ref 0.5–1.9)
LYMPHOCYTES ABSOLUTE: 1.14 E9/L (ref 1.5–4)
LYMPHOCYTES RELATIVE PERCENT: 8 % (ref 20–42)
Lab: ABNORMAL
MCH RBC QN AUTO: 29.7 PG (ref 26–35)
MCHC RBC AUTO-ENTMCNC: 31 % (ref 32–34.5)
MCV RBC AUTO: 95.7 FL (ref 80–99.9)
METHB: 0.3 % (ref 0–1.5)
MODE: ABNORMAL
MONOCYTES ABSOLUTE: 0.87 E9/L (ref 0.1–0.95)
MONOCYTES RELATIVE PERCENT: 6.1 % (ref 2–12)
MYCOPLASMA PNEUMONIAE BY PCR: NOT DETECTED
NEUTROPHILS ABSOLUTE: 11.58 E9/L (ref 1.8–7.3)
NEUTROPHILS RELATIVE PERCENT: 81.2 % (ref 43–80)
O2 CONTENT: 18.5 ML/DL
O2 CONTENT: 18.8 ML/DL
O2 CONTENT: 20.8 ML/DL
O2 SATURATION: 97.7 % (ref 92–98.5)
O2 SATURATION: 97.9 % (ref 92–98.5)
O2 SATURATION: 98.7 % (ref 92–98.5)
O2HB: 95.8 % (ref 94–97)
O2HB: 96.4 % (ref 94–97)
O2HB: 97.3 % (ref 94–97)
OPERATOR ID: 1741
OPERATOR ID: 2485
OPERATOR ID: ABNORMAL
PARAINFLUENZA VIRUS 1 BY PCR: NOT DETECTED
PARAINFLUENZA VIRUS 2 BY PCR: NOT DETECTED
PARAINFLUENZA VIRUS 3 BY PCR: NOT DETECTED
PARAINFLUENZA VIRUS 4 BY PCR: NOT DETECTED
PATIENT TEMP: 37 C
PCO2: 62.7 MMHG (ref 35–45)
PCO2: 75 MMHG (ref 35–45)
PCO2: 90.2 MMHG (ref 35–45)
PDW BLD-RTO: 13.2 FL (ref 11.5–15)
PEEP/CPAP: 5 CMH2O
PEEP/CPAP: 5 CMH2O
PFO2: 2.59 MMHG/%
PFO2: 2.78 MMHG/%
PH BLOOD GAS: 7.19 (ref 7.35–7.45)
PH BLOOD GAS: 7.24 (ref 7.35–7.45)
PH BLOOD GAS: 7.27 (ref 7.35–7.45)
PLATELET # BLD: 274 E9/L (ref 130–450)
PMV BLD AUTO: 9.7 FL (ref 7–12)
PO2: 103.7 MMHG (ref 75–100)
PO2: 120.2 MMHG (ref 75–100)
PO2: 138.9 MMHG (ref 75–100)
POTASSIUM SERPL-SCNC: 4.6 MMOL/L (ref 3.5–5)
PRO-BNP: 140 PG/ML (ref 0–125)
PROCALCITONIN: 0.04 NG/ML (ref 0–0.08)
PS: 15 CMH20
RBC # BLD: 4.88 E12/L (ref 3.5–5.5)
RESPIRATORY SYNCYTIAL VIRUS BY PCR: NOT DETECTED
RI(T): 0.84
RI(T): 1.05
RR MECHANICAL: 20 B/MIN
SARS-COV-2, NAAT: NOT DETECTED
SARS-COV-2, PCR: NOT DETECTED
SODIUM BLD-SCNC: 138 MMOL/L (ref 132–146)
SOURCE, BLOOD GAS: ABNORMAL
THB: 13.6 G/DL (ref 11.5–16.5)
THB: 13.6 G/DL (ref 11.5–16.5)
THB: 15.3 G/DL (ref 11.5–16.5)
TIME ANALYZED: 137
TIME ANALYZED: 609
TIME ANALYZED: 958
TROPONIN, HIGH SENSITIVITY: 13 NG/L (ref 0–9)
TROPONIN, HIGH SENSITIVITY: 15 NG/L (ref 0–9)
TROPONIN, HIGH SENSITIVITY: 17 NG/L (ref 0–9)
VT MECHANICAL: 450 ML
WBC # BLD: 14.3 E9/L (ref 4.5–11.5)

## 2023-02-07 PROCEDURE — 83880 ASSAY OF NATRIURETIC PEPTIDE: CPT

## 2023-02-07 PROCEDURE — 0202U NFCT DS 22 TRGT SARS-COV-2: CPT

## 2023-02-07 PROCEDURE — 6360000002 HC RX W HCPCS: Performed by: INTERNAL MEDICINE

## 2023-02-07 PROCEDURE — 80048 BASIC METABOLIC PNL TOTAL CA: CPT

## 2023-02-07 PROCEDURE — 6360000004 HC RX CONTRAST MEDICATION: Performed by: RADIOLOGY

## 2023-02-07 PROCEDURE — 6360000002 HC RX W HCPCS: Performed by: FAMILY MEDICINE

## 2023-02-07 PROCEDURE — A4216 STERILE WATER/SALINE, 10 ML: HCPCS | Performed by: FAMILY MEDICINE

## 2023-02-07 PROCEDURE — 6360000002 HC RX W HCPCS

## 2023-02-07 PROCEDURE — 85025 COMPLETE CBC W/AUTO DIFF WBC: CPT

## 2023-02-07 PROCEDURE — 2500000003 HC RX 250 WO HCPCS: Performed by: EMERGENCY MEDICINE

## 2023-02-07 PROCEDURE — 84484 ASSAY OF TROPONIN QUANT: CPT

## 2023-02-07 PROCEDURE — 6360000002 HC RX W HCPCS: Performed by: EMERGENCY MEDICINE

## 2023-02-07 PROCEDURE — 87040 BLOOD CULTURE FOR BACTERIA: CPT

## 2023-02-07 PROCEDURE — 6370000000 HC RX 637 (ALT 250 FOR IP): Performed by: EMERGENCY MEDICINE

## 2023-02-07 PROCEDURE — 87635 SARS-COV-2 COVID-19 AMP PRB: CPT

## 2023-02-07 PROCEDURE — 83605 ASSAY OF LACTIC ACID: CPT

## 2023-02-07 PROCEDURE — 93010 ELECTROCARDIOGRAM REPORT: CPT | Performed by: INTERNAL MEDICINE

## 2023-02-07 PROCEDURE — 71045 X-RAY EXAM CHEST 1 VIEW: CPT

## 2023-02-07 PROCEDURE — 2500000003 HC RX 250 WO HCPCS: Performed by: FAMILY MEDICINE

## 2023-02-07 PROCEDURE — 36600 WITHDRAWAL OF ARTERIAL BLOOD: CPT

## 2023-02-07 PROCEDURE — 2000000000 HC ICU R&B

## 2023-02-07 PROCEDURE — 94660 CPAP INITIATION&MGMT: CPT

## 2023-02-07 PROCEDURE — 85378 FIBRIN DEGRADE SEMIQUANT: CPT

## 2023-02-07 PROCEDURE — 82805 BLOOD GASES W/O2 SATURATION: CPT

## 2023-02-07 PROCEDURE — 36415 COLL VENOUS BLD VENIPUNCTURE: CPT

## 2023-02-07 PROCEDURE — 84145 PROCALCITONIN (PCT): CPT

## 2023-02-07 PROCEDURE — 87081 CULTURE SCREEN ONLY: CPT

## 2023-02-07 PROCEDURE — 99233 SBSQ HOSP IP/OBS HIGH 50: CPT | Performed by: FAMILY MEDICINE

## 2023-02-07 PROCEDURE — 71275 CT ANGIOGRAPHY CHEST: CPT

## 2023-02-07 PROCEDURE — 94640 AIRWAY INHALATION TREATMENT: CPT

## 2023-02-07 PROCEDURE — 2580000003 HC RX 258: Performed by: FAMILY MEDICINE

## 2023-02-07 PROCEDURE — 6370000000 HC RX 637 (ALT 250 FOR IP): Performed by: FAMILY MEDICINE

## 2023-02-07 PROCEDURE — 93005 ELECTROCARDIOGRAM TRACING: CPT

## 2023-02-07 PROCEDURE — 87502 INFLUENZA DNA AMP PROBE: CPT

## 2023-02-07 PROCEDURE — 2580000003 HC RX 258: Performed by: EMERGENCY MEDICINE

## 2023-02-07 RX ORDER — BUDESONIDE 0.5 MG/2ML
0.5 INHALANT ORAL 2 TIMES DAILY
Status: DISCONTINUED | OUTPATIENT
Start: 2023-02-07 | End: 2023-02-13 | Stop reason: HOSPADM

## 2023-02-07 RX ORDER — ENOXAPARIN SODIUM 100 MG/ML
40 INJECTION SUBCUTANEOUS DAILY
Status: DISCONTINUED | OUTPATIENT
Start: 2023-02-07 | End: 2023-02-13 | Stop reason: HOSPADM

## 2023-02-07 RX ORDER — BISACODYL 10 MG
10 SUPPOSITORY, RECTAL RECTAL DAILY PRN
Status: DISCONTINUED | OUTPATIENT
Start: 2023-02-07 | End: 2023-02-13 | Stop reason: HOSPADM

## 2023-02-07 RX ORDER — IPRATROPIUM BROMIDE AND ALBUTEROL SULFATE 2.5; .5 MG/3ML; MG/3ML
1 SOLUTION RESPIRATORY (INHALATION)
Status: DISCONTINUED | OUTPATIENT
Start: 2023-02-07 | End: 2023-02-13 | Stop reason: HOSPADM

## 2023-02-07 RX ORDER — 0.9 % SODIUM CHLORIDE 0.9 %
30 INTRAVENOUS SOLUTION INTRAVENOUS ONCE
Status: COMPLETED | OUTPATIENT
Start: 2023-02-07 | End: 2023-02-07

## 2023-02-07 RX ORDER — MIDAZOLAM HYDROCHLORIDE 1 MG/ML
INJECTION INTRAMUSCULAR; INTRAVENOUS
Status: DISCONTINUED
Start: 2023-02-07 | End: 2023-02-07 | Stop reason: WASHOUT

## 2023-02-07 RX ORDER — SODIUM CHLORIDE 9 MG/ML
INJECTION, SOLUTION INTRAVENOUS PRN
Status: DISCONTINUED | OUTPATIENT
Start: 2023-02-07 | End: 2023-02-13 | Stop reason: HOSPADM

## 2023-02-07 RX ORDER — SODIUM CHLORIDE 0.9 % (FLUSH) 0.9 %
5-40 SYRINGE (ML) INJECTION EVERY 12 HOURS SCHEDULED
Status: DISCONTINUED | OUTPATIENT
Start: 2023-02-07 | End: 2023-02-13 | Stop reason: HOSPADM

## 2023-02-07 RX ORDER — ONDANSETRON 2 MG/ML
4 INJECTION INTRAMUSCULAR; INTRAVENOUS EVERY 6 HOURS PRN
Status: DISCONTINUED | OUTPATIENT
Start: 2023-02-07 | End: 2023-02-13 | Stop reason: HOSPADM

## 2023-02-07 RX ORDER — ONDANSETRON 4 MG/1
4 TABLET, ORALLY DISINTEGRATING ORAL EVERY 8 HOURS PRN
Status: DISCONTINUED | OUTPATIENT
Start: 2023-02-07 | End: 2023-02-13 | Stop reason: HOSPADM

## 2023-02-07 RX ORDER — ATORVASTATIN CALCIUM 20 MG/1
20 TABLET, FILM COATED ORAL EVERY MORNING
COMMUNITY

## 2023-02-07 RX ORDER — ACETAMINOPHEN 325 MG/1
650 TABLET ORAL EVERY 6 HOURS PRN
Status: DISCONTINUED | OUTPATIENT
Start: 2023-02-07 | End: 2023-02-13 | Stop reason: HOSPADM

## 2023-02-07 RX ORDER — ACETAMINOPHEN 650 MG/1
650 SUPPOSITORY RECTAL EVERY 6 HOURS PRN
Status: DISCONTINUED | OUTPATIENT
Start: 2023-02-07 | End: 2023-02-13 | Stop reason: HOSPADM

## 2023-02-07 RX ORDER — METHYLPREDNISOLONE SODIUM SUCCINATE 40 MG/ML
40 INJECTION, POWDER, LYOPHILIZED, FOR SOLUTION INTRAMUSCULAR; INTRAVENOUS EVERY 6 HOURS
Status: DISCONTINUED | OUTPATIENT
Start: 2023-02-07 | End: 2023-02-08

## 2023-02-07 RX ORDER — ARFORMOTEROL TARTRATE 15 UG/2ML
15 SOLUTION RESPIRATORY (INHALATION) 2 TIMES DAILY
Status: DISCONTINUED | OUTPATIENT
Start: 2023-02-07 | End: 2023-02-13 | Stop reason: HOSPADM

## 2023-02-07 RX ORDER — BUDESONIDE 0.5 MG/2ML
1 INHALANT ORAL 2 TIMES DAILY
COMMUNITY

## 2023-02-07 RX ORDER — CELECOXIB 200 MG/1
200 CAPSULE ORAL EVERY MORNING
Status: ON HOLD | COMMUNITY
End: 2023-02-13 | Stop reason: HOSPADM

## 2023-02-07 RX ORDER — LORATADINE 10 MG/1
10 TABLET ORAL EVERY MORNING
Status: ON HOLD | COMMUNITY
End: 2023-02-13 | Stop reason: HOSPADM

## 2023-02-07 RX ORDER — IPRATROPIUM BROMIDE AND ALBUTEROL SULFATE 2.5; .5 MG/3ML; MG/3ML
3 SOLUTION RESPIRATORY (INHALATION) ONCE
Status: COMPLETED | OUTPATIENT
Start: 2023-02-07 | End: 2023-02-07

## 2023-02-07 RX ORDER — AZITHROMYCIN 250 MG/1
500 TABLET, FILM COATED ORAL DAILY
Status: DISPENSED | OUTPATIENT
Start: 2023-02-07 | End: 2023-02-10

## 2023-02-07 RX ORDER — LORAZEPAM 2 MG/ML
0.5 INJECTION INTRAMUSCULAR ONCE
Status: COMPLETED | OUTPATIENT
Start: 2023-02-07 | End: 2023-02-07

## 2023-02-07 RX ORDER — DESVENLAFAXINE 50 MG/1
50 TABLET, EXTENDED RELEASE ORAL NIGHTLY
COMMUNITY

## 2023-02-07 RX ORDER — SODIUM CHLORIDE 0.9 % (FLUSH) 0.9 %
5-40 SYRINGE (ML) INJECTION PRN
Status: DISCONTINUED | OUTPATIENT
Start: 2023-02-07 | End: 2023-02-13 | Stop reason: HOSPADM

## 2023-02-07 RX ADMIN — IPRATROPIUM BROMIDE AND ALBUTEROL SULFATE 1 AMPULE: 2.5; .5 SOLUTION RESPIRATORY (INHALATION) at 19:53

## 2023-02-07 RX ADMIN — IPRATROPIUM BROMIDE AND ALBUTEROL SULFATE 1 AMPULE: 2.5; .5 SOLUTION RESPIRATORY (INHALATION) at 16:28

## 2023-02-07 RX ADMIN — SODIUM CHLORIDE 2544 ML: 9 INJECTION, SOLUTION INTRAVENOUS at 07:57

## 2023-02-07 RX ADMIN — DOXYCYCLINE 100 MG: 100 INJECTION, POWDER, LYOPHILIZED, FOR SOLUTION INTRAVENOUS at 08:04

## 2023-02-07 RX ADMIN — IPRATROPIUM BROMIDE AND ALBUTEROL SULFATE 1 AMPULE: 2.5; .5 SOLUTION RESPIRATORY (INHALATION) at 12:16

## 2023-02-07 RX ADMIN — SODIUM CHLORIDE, PRESERVATIVE FREE 20 MG: 5 INJECTION INTRAVENOUS at 11:25

## 2023-02-07 RX ADMIN — BUDESONIDE 500 MCG: 0.5 SUSPENSION RESPIRATORY (INHALATION) at 19:53

## 2023-02-07 RX ADMIN — SODIUM CHLORIDE, PRESERVATIVE FREE 10 ML: 5 INJECTION INTRAVENOUS at 11:25

## 2023-02-07 RX ADMIN — ENOXAPARIN SODIUM 40 MG: 100 INJECTION SUBCUTANEOUS at 11:24

## 2023-02-07 RX ADMIN — ARFORMOTEROL TARTRATE 15 MCG: 15 SOLUTION RESPIRATORY (INHALATION) at 19:53

## 2023-02-07 RX ADMIN — IPRATROPIUM BROMIDE AND ALBUTEROL SULFATE 3 AMPULE: .5; 2.5 SOLUTION RESPIRATORY (INHALATION) at 06:20

## 2023-02-07 RX ADMIN — METHYLPREDNISOLONE SODIUM SUCCINATE 125 MG: 125 INJECTION, POWDER, FOR SOLUTION INTRAMUSCULAR; INTRAVENOUS at 01:22

## 2023-02-07 RX ADMIN — METHYLPREDNISOLONE SODIUM SUCCINATE 40 MG: 40 INJECTION, POWDER, LYOPHILIZED, FOR SOLUTION INTRAMUSCULAR; INTRAVENOUS at 11:25

## 2023-02-07 RX ADMIN — SODIUM CHLORIDE, PRESERVATIVE FREE 10 ML: 5 INJECTION INTRAVENOUS at 20:51

## 2023-02-07 RX ADMIN — BUDESONIDE 500 MCG: 0.5 SUSPENSION RESPIRATORY (INHALATION) at 12:16

## 2023-02-07 RX ADMIN — IOPAMIDOL 75 ML: 755 INJECTION, SOLUTION INTRAVENOUS at 05:56

## 2023-02-07 RX ADMIN — LORAZEPAM 0.5 MG: 2 INJECTION INTRAMUSCULAR at 05:36

## 2023-02-07 RX ADMIN — SODIUM CHLORIDE, PRESERVATIVE FREE 20 MG: 5 INJECTION INTRAVENOUS at 20:50

## 2023-02-07 RX ADMIN — METHYLPREDNISOLONE SODIUM SUCCINATE 40 MG: 40 INJECTION, POWDER, LYOPHILIZED, FOR SOLUTION INTRAMUSCULAR; INTRAVENOUS at 17:00

## 2023-02-07 RX ADMIN — WATER 1000 MG: 1 INJECTION INTRAMUSCULAR; INTRAVENOUS; SUBCUTANEOUS at 07:59

## 2023-02-07 RX ADMIN — ARFORMOTEROL TARTRATE 15 MCG: 15 SOLUTION RESPIRATORY (INHALATION) at 12:15

## 2023-02-07 RX ADMIN — METHYLPREDNISOLONE SODIUM SUCCINATE 40 MG: 40 INJECTION, POWDER, LYOPHILIZED, FOR SOLUTION INTRAMUSCULAR; INTRAVENOUS at 23:08

## 2023-02-07 ASSESSMENT — PAIN SCALES - GENERAL
PAINLEVEL_OUTOF10: 0
PAINLEVEL_OUTOF10: 0

## 2023-02-07 NOTE — ED NOTES
Nurse to nurse given to Simon Garibay, [address] [place] [address].      Luke Luke, OMI  02/07/23 [id number]

## 2023-02-07 NOTE — PROGRESS NOTES
OOB up to bedside commode continent of 400 cc of urine. Pt. On attempts to get back to bed, started to panic, became tachypnic and  tachycardic. Unable to talk pt. Down, Sats never dropped below 96%. Placed pt. Back on AVAPS at her request until she was comfortable. 11:45 Pt. Back on 6L NC eating her lunch without difficulty. Much emotional support given to pt. And . Teaching done on activity and oxygen conservation during COPD exacerbation. Both verbalized understanding. Will need to continue to reinforce.

## 2023-02-07 NOTE — H&P
[facility] History and Physical      CHIEF COMPLAINT:  Dyspnea and altered mentation    History of Present Illness:  Patient is a 66 yo female with history of Chronic Respiratory Failure on 4L of O2 via Nasal Cannula who developed worsening dyspnea since Wednesday. She was hospitalized on January 14th and Discharged on January 17th. She received oral steroids until January 19, 2023. Yesterday she missed three breathing treatments. She became anxious and was prescribed Pristiq and Ativan. She has not had HA, CP, palpitations, N, V, D, rash or exposure to anyone sick. No fever or chills. She was brought to the ED via EMS and was hypoxic (high 70s). ABG was with hypercarbia and respiratory acidosis. BIPAP was administered. Repeat ABG was worse. AVAPS was ordered. She will be admitted to the ICU for further management and possible intubation if no improvement in her respiratory status. Patient has history of Tobacco use. She is up to date with her Flu and COVID 19 vaccines. Informant(s) for H&P:  Spouse    REVIEW OF SYSTEMS:  A comprehensive review of systems was negative except for: what is in the HPI      PMH:  Past Medical History:   Diagnosis Date    Asthma     Chronic renal disease, stage III [facility]) [[id number]] 7/18/2022    Depression     Hyperlipidemia     Osteoarthritis     Seizures ([facility] [address]        Surgical History:  Past Surgical History:   Procedure Laterality Date    HYSTERECTOMY (CERVIX STATUS UNKNOWN)      TONSILLECTOMY         Medications Prior to Admission:    Prior to Admission medications    Medication Sig Start Date End Date Taking? Authorizing Provider   desvenlafaxine succinate (PRISTIQ) 50 MG TB24 extended release tablet Take 1 tablet by mouth daily 2/6/23   Rama Khan MD   LORazepam (ATIVAN) 0.5 MG tablet Take 1 tablet by mouth 2 times daily as needed for Anxiety for up to 30 days.  Max Daily Amount: 1 mg 2/6/23 3/8/23  Rama Khan MD   arformoterol tartrate (BROVANA) 15 MCG/2ML NEBU Take 1 ampule by nebulization 2 times daily 1/31/23   Bernardine Reji, APRN - NP   formoterol (PERFOROMIST) 20 MCG/2ML nebulizer solution Take 2 mLs by nebulization in the morning and 2 mLs in the evening. 1/31/23   Bernardine Reji, APRN - NP   loratadine (CLARITIN) 10 MG tablet Take 1 tablet by mouth daily 1/24/23   Russel Lam MD   ipratropium-albuterol (DUONEB) 0.5-2.5 (3) MG/3ML SOLN nebulizer solution Inhale 3 mLs into the lungs every 4 hours (while awake) 1/17/23   Bernardine Reji, APRN - NP   budesonide (PULMICORT) 0.25 MG/2ML nebulizer suspension Take 4 mLs by nebulization 2 times daily 1/17/23   Bernardine Reji, APRN - NP   varenicline (CHANTIX) 0.5 MG tablet Take 1-2 tablets by mouth See Admin Instructions 0.5mg DAILY for 3 days followed by 0.5mg TWICE DAILY for 4 days followed by 1mg TWICE DAILY 1/17/23   Jeanine Awad MD   albuterol sulfate HFA (VENTOLIN HFA) 108 (90 Base) MCG/ACT inhaler Inhale 2 puffs into the lungs every 6 hours as needed for Wheezing 10/24/22   Russel Lam MD   atorvastatin (LIPITOR) 20 MG tablet Take 1 tablet by mouth daily 10/24/22   Russel Lam MD   sulfaSALAzine (AZULFIDINE) 500 MG EC tablet Take 500 mg by mouth daily 6/4/18   Nancy Quiros MD   celecoxib (CELEBREX) 200 MG capsule Take 1 capsule by mouth 2 times daily  Patient taking differently: Take 200 mg by mouth daily 9/5/18   Russel Lam MD       Allergies:    Penicillins    Social History:    reports that she has been smoking cigarettes. She started smoking about 58 years ago. She has a 57.00 pack-year smoking history. She uses smokeless tobacco. She reports current alcohol use. She reports that she does not use drugs. Family History:   family history is not on file.        PHYSICAL EXAM:  Vitals:  /72   Pulse 92   Temp 98.3 °F (36.8 °C) (Axillary)   Resp 20   Ht 5' 2\" (1.575 m)   Wt 187 lb (84.8 kg)   SpO2 98%   BMI 34.20 kg/m²     General Appearance: obtunded on NIV. Not responsive to verbal or tactile stimuli. No retractions. Looks sick  Skin: warm and dry  Head: normocephalic and atraumatic  Eyes: pupils equal, round, and reactive to light, Neck: neck supple and non tender without mass   Pulmonary/Chest: no wheezes, rales or rhonchi, improved air movement, with respiratory distress  Cardiovascular: normal rate, normal S1 and S2 and no carotid bruits  Abdomen: soft, non-tender, distended, normal bowel sounds, no masses or organomegaly  Extremities: no cyanosis, no clubbing and no edema  Neurologic: unresponsive due to hypercarbia      LABS:  Recent Labs     02/07/23 0116      K 4.6   CL 99   CO2 27   BUN 17   CREATININE 0.7   GLUCOSE 140*   CALCIUM 9.4       Recent Labs     02/07/23 0116   WBC 14.3*   RBC 4.88   HGB 14.5   HCT 46.7   MCV 95.7   MCH 29.7   MCHC 31.0*   RDW 13.2      MPV 9.7     ABG:    Lab Results   Component Value Date/Time    PH 7.191 02/07/2023 06:09 AM    PCO2 90.2 02/07/2023 06:09 AM    PO2 138.9 02/07/2023 06:09 AM    HCO3 33.8 02/07/2023 06:09 AM    BE 2.7 02/07/2023 06:09 AM    O2SAT 98.7 02/07/2023 06:09 AM       Radiology:   CTA PULMONARY W CONTRAST   Final Result   No evidence of pulmonary embolism or acute pulmonary abnormality. XR CHEST PORTABLE   Final Result   No acute process. EKG: Sinus Tachycardia, rate 101. No acute changes. QTc 436    ASSESSMENT:      Principal Problem:    Hypercapnia  Resolved Problems:    * No resolved hospital problems. *      PLAN:    1. Acute Respiratory Failure with Hypercapnia - patient was feeling well according to spouse and had been going periods without oxygen. Yesterday she missed three respiratory treatments. She developed acute respiratory distress not responsive to resumption of her respiratory treatments. She presented to the ED obtunded and with pCO2 in the mid 70s. Despite BIPAP her condition worsened to pCO2 in the mid 90s.   She is being admitted to the ICU for further respiratory management and possible intubation. CXR did not reveal any infiltrate. Flu and COVID 19 are negative. Respiratory panel is pending. Patient received in the ED - IV Solumedrol of 125 mg x1, Rocephin and Doxycycline. Blood cultures have been ordered x 2. Intensivist consult. Pulmonary consult with Dr. Hakeem Ash who knows her well. CT of chest was negative for PE. 2.   Anxiety - has been on Ativan 0.5 mg po bid and Pristiq 50 mg daily. To continue Ativan IV and resume Pristiq when able to take oral well. 3.  Tobacco Use disorder - unclear if was abstinent. Has Chantix on her med reconciliation. Nicoderm patch 21 mg daily. 4.  Hyperlipidemia - on Lipitor 20 mg daily. Continue same. 5.  Obesity - BMI 34.2. Supportive care. Component of Obesity Hypoventilation syndrome. Continue respiratory support. Code Status: Full  DVT prophylaxis: Lovenox    NOTE: This report was transcribed using voice recognition software. Every effort was made to ensure accuracy; however, inadvertent computerized transcription errors may be present.     Electronically signed by Mardene Halsted, MD on 2/7/2023 at 8:24 AM

## 2023-02-07 NOTE — CONSULTS
Critical Care Admit/Consult Note         Patient - Kris Vargas   MRN -  [id number]   Acct # - [de-identified]   - 1950      Date of Admission -  2023 11:31 PM  Date of evaluation -  2023-A   Hospital Day - 0            ADMIT/CONSULT DETAILS     Reason for Admit/Consult   COPD exacerbation. Nikolai Potter MD  Primary Care Physician - Franco Herrera MD         HPI   The patient is a 67 y.o. female with significant past medical history of COPD  presents to the emergency department for wheezing and shortness of breath that started around Wednesday and has gotten worse ever since. Symptoms are worsened with movement and slightly better with rest.  She has tried breathing treatments at home with no relief. She was recently admitted for COPD exacerbation from  to 2023. She stopped steroid therapy on the . She is not on antibiotics. No known sick contacts. No changes in cough frequency and sputum. She also denies the following: Chest pain, fever, chills, Braden pain, nausea, vomiting, diaphoresis, jaw pain, focal weakness/numbness in the extremities, Rashes, pruritus, history of blood clots, recent surgeries, hemoptysis, pleuritic chest pain, leg edema/erythema/tenderness, estrogen therapy, history of cancer history of chemotherapy. Patient did say she chronically wears 4 L nasal cannula since being discharged and had to increase her to 5 L today. Per EMS report patient was in the high 70s low 80% on room air. In ER, pt was put on BIPAP, got dose of solu-medrol, breathing treatment, doxy and Rocephin. Repeat ABGs showed worsening in respiratory acidosis with with pCO2 of 90. 2. pt was shifted to AVAPs from BIPAP and shifted to ICU for further management.       Past Medical History         Diagnosis Date    Asthma     Chronic renal disease, stage III [facility]) [[id number]] 2022    Depression     Hyperlipidemia Osteoarthritis     Seizures ([facility] [address]         Past Surgical History           Procedure Laterality Date    HYSTERECTOMY (CERVIX STATUS UNKNOWN)      TONSILLECTOMY         Current Medications   Current Medications    sodium chloride flush  5-40 mL IntraVENous 2 times per day    enoxaparin  40 mg SubCUTAneous Daily    famotidine (PEPCID) injection  20 mg IntraVENous BID    ipratropium-albuterol  1 ampule Inhalation Q4H WA    budesonide  0.5 mg Nebulization BID    arformoterol tartrate  15 mcg Nebulization BID    methylPREDNISolone  40 mg IntraVENous Q6H    azithromycin  500 mg Oral Daily     sodium chloride flush, sodium chloride, ondansetron **OR** ondansetron, acetaminophen **OR** acetaminophen, bisacodyl  IV Drips/Infusions   sodium chloride       Home Medications  Medications Prior to Admission: budesonide (PULMICORT) 0.5 MG/2ML nebulizer suspension, Take 1 ampule by nebulization 2 times daily  celecoxib (CELEBREX) 200 MG capsule, Take 200 mg by mouth every morning  atorvastatin (LIPITOR) 20 MG tablet, Take 20 mg by mouth every morning  desvenlafaxine succinate (PRISTIQ) 50 MG TB24 extended release tablet, Take 50 mg by mouth nightly  loratadine (CLARITIN) 10 MG tablet, Take 10 mg by mouth every morning  OXYGEN, Inhale 4 L/min into the lungs continuous  LORazepam (ATIVAN) 0.5 MG tablet, Take 1 tablet by mouth 2 times daily as needed for Anxiety for up to 30 days. Max Daily Amount: 1 mg  arformoterol tartrate (BROVANA) 15 MCG/2ML NEBU, Take 1 ampule by nebulization 2 times daily  formoterol (PERFOROMIST) 20 MCG/2ML nebulizer solution, Take 2 mLs by nebulization in the morning and 2 mLs in the evening.   ipratropium-albuterol (DUONEB) 0.5-2.5 (3) MG/3ML SOLN nebulizer solution, Inhale 3 mLs into the lungs every 4 hours (while awake)  varenicline (CHANTIX) 0.5 MG tablet, Take 1-2 tablets by mouth See Admin Instructions 0.5mg DAILY for 3 days followed by 0.5mg TWICE DAILY for 4 days followed by 1mg TWICE DAILY  albuterol sulfate HFA (VENTOLIN HFA) 108 (90 Base) MCG/ACT inhaler, Inhale 2 puffs into the lungs every 6 hours as needed for Wheezing  sulfaSALAzine (AZULFIDINE) 500 MG EC tablet, Take 500 mg by mouth every morning    Diet/Nutrition   ADULT DIET; Regular    Allergies   Penicillins    Social History   Tobacco   reports that she has been smoking cigarettes. She started smoking about 58 years ago. She has a 57.00 pack-year smoking history. She uses smokeless tobacco.    Alcohol     reports current alcohol use. Occupational history :    Family History   No family history on file. Sleep History   n/a    ROS     REVIEW OF SYSTEMS:  CONSTITUTIONAL:  negative for  fevers, chills, sweats, and fatigue  EYES:  negative except for  double vision and blurred vision  HEENT:  negative for  sore mouth, sore throat, hoarseness, and voice change  RESPIRATORY:  negative except for  dry cough, dyspnea, and wheezing  CARDIOVASCULAR:  negative for  chest pain, dyspnea  GASTROINTESTINAL:  negative for nausea, vomiting, change in bowel habits, diarrhea, and constipation  NEUROLOGICAL:  negative for tremor, weakness, numbness, and syncope  BEHAVIOR/PSYCH:  normal mood. Mechanical Ventilation Data   VENT SETTINGS (Comprehensive)     Additional Respiratory Assessments  Heart Rate: 98  Resp: 24  SpO2: 100 %    ABG  Lab Results   Component Value Date/Time    PH 7.270 2023 09:58 AM    PCO2 62.7 2023 09:58 AM    PO2 103.7 2023 09:58 AM    HCO3 28.1 2023 09:58 AM    O2SAT 97.9 2023 09:58 AM     Lab Results   Component Value Date/Time    MODE AVAPS 2023 09:58 AM           Vitals    height is 5' 2\" (1.575 m) and weight is 187 lb (84.8 kg). Her axillary temperature is 97.8 °F (36.6 °C). Her blood pressure is 133/75 and her pulse is 98. Her respiration is 24 and oxygen saturation is 100%.        Temperature Range: Temp: 97.8 °F (36.6 °C) Temp  Av.4 °F (36.9 °C)  Min: 97.8 °F (36.6 °C)  Max: 99 °F (37.2 °C)  BP Range:  Systolic (84LPX), YOV:455 , Min:121 , FUH:539     Diastolic (61ALJ), GRY:64, Min:72, Max:105    Pulse Range: Pulse  Av  Min: 84  Max: 115  Respiration Range: Resp  Av.1  Min: 20  Max: 28  Current Pulse Ox[de-identified]  SpO2: 100 %  24HR Pulse Ox Range:  SpO2  Av.8 %  Min: 95 %  Max: 100 %  Oxygen Amount and Delivery: O2 Flow Rate (L/min): 6 L/min      I/O (24 Hours)    Patient Vitals for the past 8 hrs:   BP Temp Temp src Pulse Resp SpO2   23 0947 133/75 97.8 °F (36.6 °C) Axillary 98 24 100 %   23 0843 121/72 -- -- 84 20 98 %   23 0810 131/72 98.3 °F (36.8 °C) Axillary 92 20 98 %   23 0625 -- -- -- -- 21 --   23 0602 (!) 151/81 -- -- (!) 102 22 98 %   23 0601 -- -- -- -- 24 --   23 0535 (!) 185/105 -- -- (!) 115 28 98 %     No intake or output data in the 24 hours ending 23 1031  No intake/output data recorded. Patient Vitals for the past 96 hrs (Last 3 readings):   Weight   23 2337 187 lb (84.8 kg)         Exam     PHYSICAL EXAM:  General appearance - alert, well appearing, and in no distress, having NIV mask on. Mental status - alert, oriented to person, place, and time  Eyes - pupils equal and reactive, extraocular eye movements intact  Ears - bilateral TM's and external ear canals normal  Nose - normal and patent, no erythema, discharge or polyps  Mouth - mucous membranes moist, pharynx normal without lesions  Neck - supple, no significant adenopathy  Chest - Prominent wheeze and rales present all over lung field B/L.    Heart - normal rate, regular rhythm, normal S1, S2, no murmurs, rubs, clicks or gallops  Abdomen - soft, nontender, nondistended, no masses or organomegaly  Neurological - alert, oriented, normal speech, no focal findings or movement disorder noted}  Extremities - peripheral pulses normal, no pedal edema, no clubbing or cyanosis  Skin - normal coloration and turgor, no rashes, no suspicious skin lesions noted     Data   Old records and images have been reviewed    Lab Results   CBC     Lab Results   Component Value Date/Time    WBC 14.3 02/07/2023 01:16 AM    RBC 4.88 02/07/2023 01:16 AM    HGB 14.5 02/07/2023 01:16 AM    HCT 46.7 02/07/2023 01:16 AM     02/07/2023 01:16 AM    MCV 95.7 02/07/2023 01:16 AM    MCH 29.7 02/07/2023 01:16 AM    MCHC 31.0 02/07/2023 01:16 AM    RDW 13.2 02/07/2023 01:16 AM    LYMPHOPCT 8.0 02/07/2023 01:16 AM    MONOPCT 6.1 02/07/2023 01:16 AM    BASOPCT 0.5 02/07/2023 01:16 AM    MONOSABS 0.87 02/07/2023 01:16 AM    LYMPHSABS 1.14 02/07/2023 01:16 AM    EOSABS 0.54 02/07/2023 01:16 AM    BASOSABS 0.07 02/07/2023 01:16 AM       BMP   Lab Results   Component Value Date/Time     02/07/2023 01:16 AM    K 4.6 02/07/2023 01:16 AM    K 4.4 01/17/2023 03:05 AM    CL 99 02/07/2023 01:16 AM    CO2 27 02/07/2023 01:16 AM    BUN 17 02/07/2023 01:16 AM    CREATININE 0.7 02/07/2023 01:16 AM    GLUCOSE 140 02/07/2023 01:16 AM    CALCIUM 9.4 02/07/2023 01:16 AM       LFTS  Lab Results   Component Value Date/Time    ALKPHOS 79 01/14/2023 04:08 PM    ALT 16 01/14/2023 04:08 PM    AST 21 01/14/2023 04:08 PM    PROT 7.4 01/14/2023 04:08 PM    BILITOT 0.3 01/14/2023 04:08 PM    LABALBU 4.0 01/14/2023 04:08 PM       INR  No results for input(s): PROTIME, INR in the last 72 hours.    APTT  No results for input(s): APTT in the last 72 hours.    Lactic Acid  Lab Results   Component Value Date/Time    LACTA 0.8 01/15/2023 02:39 AM        BNP   No results for input(s): BNP in the last 72 hours.     Cultures     No results for input(s): BC in the last 72 hours.  No results for input(s): BLOODCULT2 in the last 72 hours.    No results for input(s): LABURIN in the last 72 hours.          Radiology   CXR        CT Scans  No pulmonary embolism    SYSTEMS ASSESSMENT    Neuro     Pt is AAOx3.       Respiratory   #COPD exacerbation:  -Right now pt is on AVAPS and recent ABGs are improving than last one with pH  7.1---> 7.2, pCO2 90.2---> 62.7.   -Try to wean down AVAP back to BIPAP/NC. -pt has received  dose of solumedrol, rocephin, doxy in ER.   -Start Solumedrol 40 mg  -Start Z-he.  -Start Pulmicort, Charlcie Dolin, Duoneb. -Wean oxygen as tolerated. Keep O2 sat 90-92%  -Will consult with Dr. Hakeem Ash as pt was seen by him in past.   -Repeat CXR, ABGs.   -Resp cx and RFA pending. Cardiovascular   #no acute events. -Troponin 15--> 13. Gastrointestinal   Adult regular diet. Renal   #no acute events. Infectious Disease   #No acute events. Hematology/Oncology   -#No acute events. Social/Spiritual/DNR/Other   Full code. -Pepcid.   -Levonox. Josiane Araya MD  PGY -1    2/7/2023  10:31 AM      I personally saw, examined and provided care for the patient. Radiographs, labs and medication list were reviewed by me independently. Review of Residents documentation was conducted and revisions were made as appropriate. I agree with the above documented exam, problem list and plan of care. Acute hypercapnic respiratory failure. Patient with significant tobacco use history, COPD, anxiety. Anxious and ER evaluation received Ativan with worsening hypercapnia. Clinically improved now in ICU. ABG is improved. Continue NIV as needed. We will treat for exacerbation with IV steroids, bronchodilators. Consult pulmonary -Dr. Hakeem Ash    Spouse updated at bedside.     CCT excluding procedures 35 minutes    William Alexis,

## 2023-02-07 NOTE — TELEPHONE ENCOUNTER
Writer contacted ED provider, , to inform of 30-day readmission risk via phone . Writer's attempt to contact ED provider was unsuccessful. Callback: If you need to callback to inform of disposition, please call [phone].

## 2023-02-07 NOTE — ED PROVIDER NOTES
80-year-old female former smoker, history of COPD presents to the emergency department for wheezing and shortness of breath that started around Wednesday and has gotten worse ever since. Symptoms are worsened with movement and slightly better with rest.  She has tried breathing treatments at home with no relief. She was recently admitted for COPD exacerbation from January 14 January 17, 2023. She stopped steroid therapy on the 19th. She is not on antibiotics. No known sick contacts. No changes in cough frequency and sputum. She also denies the following: Chest pain, fever, chills, Braden pain, nausea, vomiting, diaphoresis, jaw pain, focal weakness/numbness in the extremities, Rashes, pruritus, history of blood clots, recent surgeries, hemoptysis, pleuritic chest pain, leg edema/erythema/tenderness, estrogen therapy, history of cancer history of chemotherapy. Patient did say she chronically wears 4 L nasal cannula since being discharged and had to increase her to 5 L today. Per EMS report patient was in the high 70s low 80% on room air. Review of Systems   Pertinent review of systems in HPI above  Physical Exam  Constitutional:       Appearance: She is well-developed. She is obese. She is not diaphoretic. HENT:      Head: Normocephalic and atraumatic. Cardiovascular:      Rate and Rhythm: Regular rhythm. Tachycardia present. Heart sounds: No murmur heard. No friction rub. Pulmonary:      Breath sounds: Wheezing present. Comments: In monitoring patient was mildly in distress she was belly breathing I sat her up and placed blankets in her back is mildly better breathing she was sat up with pillows. She was negative for symptoms improved. There is diffuse wheezing on auscultation. Abdominal:      Palpations: Abdomen is soft. Tenderness: There is no abdominal tenderness. Musculoskeletal:         General: Normal range of motion. Right lower leg: No tenderness. No edema. Left lower leg: No tenderness. No edema. Neurological:      General: No focal deficit present. Mental Status: She is alert and oriented to person, place, and time. Procedures     EKG: This EKG is signed by emergency department physician. Rate: 101  Rhythm: Sinus  AXIS: Left axis  ST Changes: No ST elevations, QTc is 435, QRS 70,   Interpretation: Normal sinus rhythm  Comparison: Stable from previous EKG    MDM     79-year-old female former smoker, history of COPD presents to the emergency department for wheezing and shortness of breath that started around Wednesday and has gotten worse ever since. Symptoms are worsened with movement and slightly better with rest.  She has tried breathing treatments at home with no relief. She was recently admitted for COPD exacerbation from January 14 January 17, 2023. She stopped steroid therapy on the 19th. She is not on antibiotics. No known sick contacts. No changes in cough frequency and sputum. She also denies the following: Chest pain, fever, chills, Braden pain, nausea, vomiting, diaphoresis, jaw pain, focal weakness/numbness in the extremities, Rashes, pruritus, history of blood clots, recent surgeries, hemoptysis, pleuritic chest pain, leg edema/erythema/tenderness, estrogen therapy, history of cancer history of chemotherapy. Upon entering the room the patient is heard audibly wheezing she is in mild distress. She is slightly tachycardic and she is hemodynamically stable she is 94% on 5 L. There is diffuse wheezing on auscultation. Heart is regular rhythm with tachycardia. Abdomen is nondistended no guarding or tenderness. No rashes noted in the extremities. There is no pitting edema noted legs are symmetric no erythema/tenderness. No calf tenderness. Patient is given a DuoNeb 3 AM treatment and Solu-Medrol  mg with improvement in her wheezing. Her ABG showed respiratory acidosis with hypercapnia.   She was started on BiPAP 12/5, IPAP/EPAP respectively. Please see ED course below  CBC remarkable for wbc count at 14.3 likely from steriod use. Blood counts are all reasonable  BMP to evaluate for electrolytes and kidney function which were normal  EKG shows no acute changes. Troponin is 17 with repeat shows not significant change   . Not above 500 and due to HPI it unlikely she is having a CHF event. Flu studies pending   Chest x-ray please see ED course below    My impression to COPD exacerbation hypercapnia with respiratory acidosis. I considered acute coronary syndrome however patient EKG did not show acute changes and troponin  . She has no chest pain no nausea no vomiting no diaphoresis no radiating pain to the upper extremities or the jaw. I considered pneumothorax however chest x-ray did not show any evidence of increased lung markings and the patient had bilateral breath sounds. I considered Heart failure however the patient lung exam is wheezing not rales/crackles. Her LE is no pitting edema. BNP is less than 500. Although more than 100 but due to HPI and no cardiomegaly and congestive changes on X-ray,  unlikely CHF. ED Course as of 02/07/23 2214   Mon Feb 06, 2023   2332 Record review patient is a smoker has a history of COPD [MN]   2333 External note reviewed patient was recently admitted for COPD exacerbation from 1/14/2023 to 1/17/2023  \"She was being managed for COPD exacerbation. Pulmonology was following. She was placed on breathing treatments, Solu-Medrol followed by prednisone, as well as Levaquin. She was hypoxic on admission requiring 2 L NC, she desaturated to mid 80s when she was taken off oxygen. She required 4 L NC on ambulation. She continued to show improvement in her respiratory status and will be discharged home in stable condition with home oxygen. Breathing treatments per pulmonology. Steroid taper. Chantix. She is to follow-up with pulmonology in 2 weeks. \" [MN]   Adene Feb 07, 2023   0215 ABG shows respiratory acidosis with a pH of 7.2 PCO2 of 35.0 elevated. Patient is put on 12i/5e with increased deltoid pressures to 80 correcting hypercapnia [MN]   0244 Chest x-ray was ordered to evaluate for pneumonia or pneumothorax or effusions. There is no reading of consolidations, increased lung markings or effusions report costophrenic angle. No cardiomegaly. There is no acute cardiopulmonary process that is showing on chest x-ray. [MN]   0245 BNP is 140. Troponin is 17 repeat troponin pending D-dimer pending BMP pending infectious counts of blood counts are pending flu studies are pending as well. [MN]   3077 Discussed admission with Dr. Ziggy lynne to admit to the ICU. [JN]      ED Course User Index  [JN] Enrique Flynn DO  [MN] Paola Davies, DO            --------------------------------------------- PAST HISTORY ---------------------------------------------  Past Medical History:  has a past medical history of Asthma, Chronic renal disease, stage III ([facility] [address] [090199], Depression, Hyperlipidemia, Osteoarthritis, and Seizures ([facility] [address] Past Surgical History:  has a past surgical history that includes Hysterectomy and Tonsillectomy. Social History:  reports that she has been smoking cigarettes. She started smoking about 58 years ago. She has a 57.00 pack-year smoking history. She uses smokeless tobacco. She reports current alcohol use. She reports that she does not use drugs. Family History: family history is not on file. The patients home medications have been reviewed.     Allergies: Penicillins    -------------------------------------------------- RESULTS -------------------------------------------------    LABS:  Results for orders placed or performed during the hospital encounter of 02/06/23   COVID-19, Rapid    Specimen: Nasopharyngeal Swab   Result Value Ref Range    SARS-CoV-2, NAAT Not Detected Not Detected   Rapid influenza A/B antigens    Specimen: Nasopharyngeal   Result Value Ref Range    Influenza A by PCR Not Detected Not Detected    Influenza B by PCR Not Detected Not Detected   CBC with Auto Differential   Result Value Ref Range    WBC 14.3 (H) 4.5 - 11.5 E9/L    RBC 4.88 3.50 - 5.50 E12/L    Hemoglobin 14.5 11.5 - 15.5 g/dL    Hematocrit 46.7 34.0 - 48.0 %    MCV 95.7 80.0 - 99.9 fL    MCH 29.7 26.0 - 35.0 pg    MCHC 31.0 (L) 32.0 - 34.5 %    RDW 13.2 11.5 - 15.0 fL    Platelets 930 932 - 451 E9/L    MPV 9.7 7.0 - 12.0 fL    Neutrophils % 81.2 (H) 43.0 - 80.0 %    Immature Granulocytes % 0.4 0.0 - 5.0 %    Lymphocytes % 8.0 (L) 20.0 - 42.0 %    Monocytes % 6.1 2.0 - 12.0 %    Eosinophils % 3.8 0.0 - 6.0 %    Basophils % 0.5 0.0 - 2.0 %    Neutrophils Absolute 11.58 (H) 1.80 - 7.30 E9/L    Immature Granulocytes # 0.05 E9/L    Lymphocytes Absolute 1.14 (L) 1.50 - 4.00 E9/L    Monocytes Absolute 0.87 0.10 - 0.95 E9/L    Eosinophils Absolute 0.54 (H) 0.05 - 0.50 E9/L    Basophils Absolute 0.07 0.00 - 0.20 G3/X   Basic Metabolic Panel   Result Value Ref Range    Sodium 138 132 - 146 mmol/L    Potassium 4.6 3.5 - 5.0 mmol/L    Chloride 99 98 - 107 mmol/L    CO2 27 22 - 29 mmol/L    Anion Gap 12 7 - 16 mmol/L    Glucose 140 (H) 74 - 99 mg/dL    BUN 17 6 - 23 mg/dL    Creatinine 0.7 0.5 - 1.0 mg/dL    Est, Glom Filt Rate >60 >=60 mL/min/1.73    Calcium 9.4 8.6 - 10.2 mg/dL   Troponin   Result Value Ref Range    Troponin, High Sensitivity 17 (H) 0 - 9 ng/L   Brain Natriuretic Peptide   Result Value Ref Range    Pro- (H) 0 - 125 pg/mL   Blood Gas, Arterial   Result Value Ref Range    Date Analyzed 20230207     Time Analyzed 0137     Source: Blood Arterial     pH, Blood Gas 7.242 (L) 7.350 - 7.450    PCO2 75.0 (HH) 35.0 - 45.0 mmHg    PO2 120.2 (H) 75.0 - 100.0 mmHg    HCO3 31.6 (H) 22.0 - 26.0 mmol/L    B.E. 1.6 -3.0 - 3.0 mmol/L    O2 Sat 97.7 92.0 - 98.5 %    O2Hb 95.8 94.0 - 97.0 %    COHb 1.6 (H) 0.0 - 1.5 %    MetHb 0.3 0.0 - 1.5 %    O2 Content 20.8 mL/dL    HHb 2.3 0.0 - 5.0 %    tHb (est) 15.3 11.5 - 16.5 g/dL    Mode NC-5  L     Date Of Collection      Time Collected      Pt Temp 37.0 C     ID [id number]     Lab [id number]     Critical(s) Notified Handed report to /OMI    Troponin   Result Value Ref Range    Troponin, High Sensitivity 15 (H) 0 - 9 ng/L   D-Dimer, Quantitative   Result Value Ref Range    D-Dimer, Quant 598 ng/mL DDU   Blood Gas, Arterial   Result Value Ref Range    Date Analyzed 20230207     Time Analyzed 0609     Source: Blood Arterial     pH, Blood Gas 7.191 (LL) 7.350 - 7.450    PCO2 90.2 (HH) 35.0 - 45.0 mmHg    PO2 138.9 (H) 75.0 - 100.0 mmHg    HCO3 33.8 (H) 22.0 - 26.0 mmol/L    B.E. 2.7 -3.0 - 3.0 mmol/L    O2 Sat 98.7 (H) 92.0 - 98.5 %    PO2/FIO2 2.78 mmHg/%    RI(T) 0.84     O2Hb 97.3 (H) 94.0 - 97.0 %    COHb 1.1 0.0 - 1.5 %    MetHb 0.3 0.0 - 1.5 %    O2 Content 18.8 mL/dL    HHb 1.3 0.0 - 5.0 %    tHb (est) 13.6 11.5 - 16.5 g/dL    Mode NIV PAP     FIO2 50.0 %    Peep/Cpap 5.0 cmH2O    PS 15 cmH20    Date Of Collection      Time Collected      Pt Temp 37.0 C     ID [id number]     Lab 91405     Critical(s) Notified Handed report to Dr/RN    EKG 12 Lead   Result Value Ref Range    Ventricular Rate 101 BPM    Atrial Rate 101 BPM    P-R Interval 160 ms    QRS Duration 70 ms    Q-T Interval 336 ms    QTc Calculation (Bazett) 435 ms    P Axis 75 degrees    R Axis -43 degrees    T Axis 52 degrees       RADIOLOGY:  CTA PULMONARY W CONTRAST   Final Result   No evidence of pulmonary embolism or acute pulmonary abnormality. XR CHEST PORTABLE   Final Result   No acute process. ------------------------- NURSING NOTES AND VITALS REVIEWED ---------------------------  Date / Time Roomed:  2/6/2023 11:31 PM  ED Bed Assignment:  05/05    The nursing notes within the ED encounter and vital signs as below have been reviewed.      Patient Vitals for the past 24 hrs:   BP Temp Temp src Pulse Resp SpO2 Height Weight   02/07/23 8515 -- -- -- -- 21 -- -- --   02/07/23 0602 (!) 151/81 -- -- (!) 102 22 98 % -- --   02/07/23 0601 -- -- -- -- 24 -- -- --   02/07/23 0535 (!) 185/105 -- -- (!) 115 28 98 % -- --   02/07/23 0151 -- -- -- -- 25 -- -- --   02/06/23 2354 (!) 182/93 99 °F (37.2 °C) Oral -- -- -- -- --   02/06/23 2337 -- -- -- (!) 115 24 95 % 5' 2\" (1.575 m) 187 lb (84.8 kg)       Oxygen Saturation Interpretation: Improved after treatment        This patient has remained hemodynamically stable during their ED course. Diagnosis:  1. Acute on chronic respiratory failure with hypoxia and hypercapnia (HCC)    2. COPD exacerbation (HCC)    3. D-dimer, elevated    4. SIRS (systemic inflammatory response syndrome) (HCC)    5. Leukocytosis, unspecified type        Disposition:  Patient's disposition: Admit    Patient's condition is stable. Attending was present and available throughout encounter including all critical portions; See Attending Note/Attestation for Final Plan         ATTENDING PROVIDER ATTESTATION:     Rosanne Cata presented to the emergency department for evaluation of Shortness of Breath    I have reviewed and discussed the case, including pertinent history (medical, surgical, family and social) and exam findings with the Resident and the Nurse assigned to Rosanne Ivy. I have personally performed and/or participated in the history, exam, medical decision making, and procedures and agree with all pertinent clinical information. I have reviewed my findings and recommendations with Rosannedenise Ivy and members of family present at the time of disposition. I have personally reviewed all laboratory radiology results from today's visit. I have personally reviewed and agree with resident interpretation of EKG for all EKGs from today's visit. MDM:     I, Dr. Bert Weinberg am the primary provider of record    Medical Decision Making  Patient presents with shortness of breath.   History of COPD normally on supplemental oxygen, she is required additional oxygen over her baseline over the last few days. Differential diagnosis includes infectious,PE, ACS, CHF, to name a few    Work-up was initiated, first blood gas showed respiratory acidosis with elevated PCO2. She was placed on BiPAP, CTA was ordered second elevated D-dimer, she was extremely anxious and was refusing to lay flat, multiple discussions with the patient she requested anxiety medicine which was given, she has a leukocytosis, initial viral studies negative, film array has been sent. Repeat gas shows worsening respiratory acidosis. She was switched to AVAPS, spoke with critical care they will accept the patient to the unit. EKG is ordered to have documentation of patient's current rhythm, and to rule out any obvious acute cardiac illnesses such as ACS. Additionally, QT interval may be of use in decision making regarding any medications administered here in the ED. Patient is placed on cardiac monitor and continuous pulse ox for monitoring. CBC is ordered to evaluate for any signs of infection or inflammation by obtaining a WBC count, or any signs of acute anemia by interpreting hemoglobin. CMP was ordered to evaluate for any electrolyte imbalances, kidney function, or any elevations in anion gap. Troponin ordered to evaluate for possible cardiac etiology of symptoms including but not limited to STEMI or NSTEMI. Lactic acid levels were ordered to evaluate for signs of ischemia or decrease perfusion to organ systems. Viral swabs are ordered to evaluate possible viral etiology of symptoms. Blood cultures are ordered to evaluate, rule out and, if present, treat bacteremia with antibiotics narrowed down to the found organism. BNP ordered to evaluate for possible cardiac failure or worsening cardiac function.  D-dimer was ordered to evaluate for elevations due to patient's presentation, and if elevated, may suggest possible pulmonary embolism as etiology of symptoms. CT head without contrast was ordered to evaluate for acute or chronic intracranial hemorrhage or masses. A CT abdomen with IV contrast was ordered to evaluate for, but without limitation, constipation, small bowel obstruction, bowel ischemia, pneumoperitoneum, diverticulitis, cholecystitis, appendicitis, perforation. Problems Addressed:  Acute on chronic respiratory failure with hypoxia and hypercapnia ([facility] [address]): acute illness or injury that poses a threat to life or bodily functions    Amount and/or Complexity of Data Reviewed  Labs: ordered. Decision-making details documented in ED Course. Radiology: ordered and independent interpretation performed. Decision-making details documented in ED Course. ECG/medicine tests: ordered and independent interpretation performed. Decision-making details documented in ED Course. Risk  Prescription drug management. Decision regarding hospitalization. Critical Care  Total time providing critical care: 30-74 minutes (Please note that the withdrawal or failure to initiate urgent interventions for this patient would likely result in a life threatening deterioration or permanent disability. Accordingly this patient received 34 minutes of critical care time, excluding separately billable procedures.  )    Spoke with Dr. Foreign Phipps (Critical care). Discussed case. They will provide consultation. Spoke with Dr. Jayla Rossi (Medicine). Discussed case. They will admit this patient. SEP-1 CORE MEASURE DATA      Sepsis Criteria   Severe Sepsis Criteria   Septic Shock Criteria     Must be confirmed or suspected to move forward with diagnosis of sepsis. Must meet 2:    [] Temperature > 100.9 F (38.3 C)        or < 96.8 F (36 C)  [x] HR > 90  [] RR > 20  [x] WBC > 12 or < 4 or 10% bands      AND:      [x] Infection Confirmed or        Suspected.      Must meet 1:    [] Lactate > 2       or   [x] Signs of Organ Dysfunction:    - SBP < 90 or MAP < 65  - Altered mental status  - Creatinine > 2 or increased from      baseline  - Urine Output < 0.5 ml/kg/hr  - Bilirubin > 2  - INR > 1.5 (not anticoagulated)  - Platelets < 499,934  - Acute Respiratory Failure as     evidenced by new need for NIPPV     or mechanical ventilation      [] No criteria met for Severe Sepsis. Must meet 1:    [] Lactate > 4        or   [] SBP < 90 or MAP < 65 for at        least two readings in the first        hour after fluid bolus        administration      [] Vasopressors initiated (if hypotension persists after fluid resuscitation)        [] No criteria met for Septic Shock. Patient Vitals for the past 6 hrs:   BP Pulse Resp SpO2   02/07/23 0151 -- -- 25 --   02/07/23 0535 (!) 185/105 (!) 115 28 98 %   02/07/23 0601 -- -- 24 --   02/07/23 0602 (!) 151/81 (!) 102 22 98 %   02/07/23 0625 -- -- 21 --      Recent Labs     02/07/23  0116   WBC 14.3*   CREATININE 0.7            Time Severe Sepsis Identified: [id number]    Fluid Resuscitation Rational: at least 30mL/kg based on entered actual weight at time of triage      Repeat lactate level: ordered and pending at this time    Reassessment Exam:   I have reassessed tissue perfusion and hemodynamic status after fluid bolus at this time:      My findings/plan: The primary encounter diagnosis was Acute on chronic respiratory failure with hypoxia and hypercapnia ([address] Diagnoses of COPD exacerbation ([address] D-dimer, elevated, SIRS (systemic inflammatory response syndrome) ([address] and Leukocytosis, unspecified type were also pertinent to this visit.   New Prescriptions    No medications on file     Dav Bidding, DO            Dav Bidding, DO  02/07/23 8372

## 2023-02-07 NOTE — PROGRESS NOTES
Date: 2/7/2023    Time: 1:52 AM    Patient Placed On BIPAP/CPAP/ Non-Invasive Ventilation? Yes    If no must comment. Facial area red/color change? No           If YES are Blister/Lesion present? No   If yes must notify nursing staff  BIPAP/CPAP skin barrier? Yes    Skin barrier type:mepilexlite       Comments: Pt placed on BiPAP post ABG results, FiO2 started at 50%. Machine plugged into red outlet.         Erin العلي, [facility]    02/07/23 0151   NIV Type   NIV Started/Stopped On   Mode Bilevel   Mask Type Full face mask   Mask Size Small   Settings/Measurements   PIP Observed 12 cm H20   IPAP 12 cmH20   CPAP/EPAP 5 cmH2O   Vt (Measured) 433 mL   Rate Ordered 12   Resp 25   FiO2  50 %   Minute Volume (L/min) 11 Liters   Mask Leak (lpm) 23 lpm   Comfort Level Good   Using Accessory Muscles Yes   SpO2 97

## 2023-02-07 NOTE — CARE COORDINATION
Social Work:    Patient with COPD, presently off [address], doing well and likely to downgrade tomorrow per charge RN Karissa. Social service to follow-up and assess patient's discharge needs.     Electronically signed by MIR Farah on 2/7/2023 at 2:39 PM

## 2023-02-07 NOTE — CONSULTS
Pulmonary Consultation    Admit Date: 2/6/2023  Requesting Physician: Tiffany Santiago MD    Reason for consultation:  Respiratory failure  HPI:  The patient is a 70-year-old female recently discharged from the hospital in acute exacerbation of COPD. She presented again now with shortness of breath and hypoxia. Seen and evaluated emergency room, CT scan of the chest was ordered and the patient became increasingly anxious. Given some Ativan, repeat blood gases showed significant hypercapnia. Placed on noninvasive ventilation, the patient responded reasonably well and was transferred to the ICU for further therapy. Currently being treated for COPD with noninvasive ventilation as well as standard therapies, the patient becomes extremely anxious when off BiPAP. She seen this p.m. resting comfortably. PMH:    Past Medical History:   Diagnosis Date    Asthma     Chronic renal disease, stage III [facility]) [[id number]] 7/18/2022    Depression     Hyperlipidemia     Osteoarthritis     Seizures (HCC)      PSH:   Past Surgical History:   Procedure Laterality Date    HYSTERECTOMY (CERVIX STATUS UNKNOWN)      TONSILLECTOMY         Review of Systems:    Unobtainable due to patient factors.       Social History:  Alcohol:  No  Tobacco:   Ongoing, beeping  Employment:  no silica or asbestos exposure  Family:  No family history of lung disease    Medications:   sodium chloride        sodium chloride flush  5-40 mL IntraVENous 2 times per day    enoxaparin  40 mg SubCUTAneous Daily    famotidine (PEPCID) injection  20 mg IntraVENous BID    ipratropium-albuterol  1 ampule Inhalation Q4H WA    budesonide  0.5 mg Nebulization BID    arformoterol tartrate  15 mcg Nebulization BID    methylPREDNISolone  40 mg IntraVENous Q6H    azithromycin  500 mg Oral Daily       Vitals:  Tmax:  VITALS:  /74   Pulse 94   Temp 97.8 °F (36.6 °C) (Axillary)   Resp 22   Ht 5' 2\" (1.575 m)   Wt 187 lb (84.8 kg)   SpO2 96% BMI 34.20 kg/m²   24HR INTAKE/OUTPUT:  No intake or output data in the 24 hours ending 23 1743  CURRENT PULSE OXIMETRY:  SpO2: 96 %  24HR PULSE OXIMETRY RANGE:  SpO2  Av.4 %  Min: 95 %  Max: 100 %    EXAM:  General: No distress. On nonaggressive lesion  Eyes: PERRL. No sclera icterus. No conjunctival injection. ENT: No discharge. Pharynx clear. Neck: Trachea midline. Normal thyroid. No jvd, no hjr. Resp: Scattered wheezing. No accessory muscle use. No rales. No rhonchi. CV: Regular rate. Regular rhythm. No murmur No rub. Abd: Non-tender. Non-distended. No masses. No organmegaly. Normal bowel sounds. Skin: Warm and dry. No nodule on exposed extremities. No rash on exposed extremities. Lymph: No cervical LAD. No supraclavicular LAD. Ext: No joint deformity. No clubbing. No cyanosis. No edema  Neuro: Awake. Follows commands. Positive pupils/gag/corneals. Normal pain response. Lab Results:  CBC:   Recent Labs     23  0116   WBC 14.3*   HGB 14.5   HCT 46.7   MCV 95.7          BMP:  Recent Labs     23  0116      K 4.6   CL 99   CO2 27   BUN 17   CREATININE 0.7    ALB:3,BILIDIR:3,BILITOT:3,ALKPHOS:3)@    PT/INR: No results for input(s): PROTIME, INR in the last 72 hours. Cultures:  Sputum: not available  Blood: not available    ABG:   Recent Labs     23  0958   PH 7.270*   PO2 103.7*   PCO2 62.7*   HCO3 28.1*   BE -0.1   O2SAT 97.9   METHB 0.3   O2HB 96.4   COHB 1.2   O2CON 18.5   HHB 2.1   THB 13.6     FiO2 : 40 %       Films:     CTA PULMONARY W CONTRAST   Final Result   No evidence of pulmonary embolism or acute pulmonary abnormality. XR CHEST PORTABLE   Final Result   No acute process. XR CHEST PORTABLE    (Results Pending)   . Assessment:  Acute hypercapnic respiratory failure: Likely secondary, medications and COPD.       Plan:  Treat for COPD flare  NIVM as needed  Non-sedating anxiolytic      Thanks for letting us see this patient in consultation. Total time in reviewing the previous admissions and records, reviewing the current x-rays, labs, and discussing with clinical staff including nursing and physicians, exceeded 50 minutes. Please contact us with any questions. Office [phone] or after hours through [employer], x [phone]. Please note that voice recognition technology was used (while wearing a Covid mandated mask) in the preparation of this note and make therefore it may contain inadvertent transcription errors. If the patient is a COVID 19 isolation patient, the above physical exam reflects that of the examining physician for the day. Radames Baer MD, M.D., F.C.C.P.     [facility], [address] [place] [address], [address] [place] [address], [facility]  Office Visits:  [place]

## 2023-02-07 NOTE — PROGRESS NOTES
Patient admitted from ER to , with the following belongings 2 sets of gold hoops in her ears, cell phone and , all other belongings sent home with pt. placed on monitor, patient oriented to room and unit visiting hours. Patient guide at bedside, reviewed patient rights and responsibilities. MRSA nasal swab obtained. Bed alarm on. Call light within reach. Pt. Alert and oriented, VSS. Denies any complaints of pain. Pt. Is dyspnic with any exertion. ABG's obtained and pt. Remains on AVAPS at this time.

## 2023-02-07 NOTE — PATIENT CARE CONFERENCE
Intensive Care Daily Quality Rounding Checklist      ICU Team Members: Bedside Nurse, , , and Nursing Unit Leadership    ICU Day #: NUMBER: 1    Intubation Date:  na   0    Ventilator Day #: NUMBER: 0    Central Line Insertion Date:  0   0        Day #: NUMBER: 0        Indication: na     Arterial Line Insertion Date:  0   0      Day #: NUMBER: 0    Temporary Hemodialysis Catheter Insertion Date:  0   0      Day # NUMBER: 0    DVT Prophylaxis:lovenox    GI Prophylaxis:pepcid    Rice Catheter Insertion Date:  0   0       Day #: 0      Indications:       Continued need (if yes, reason documented and discussed with physician):     Skin Issues/ Wounds and ordered treatment discussed on rounds: y, no issues    Goals/ Plans for the Day:  monitor labs and replace as needed, monitor abg's and wean avaps as tolerated, breathing treatments as needed, diet as tolerated

## 2023-02-08 ENCOUNTER — APPOINTMENT (OUTPATIENT)
Dept: GENERAL RADIOLOGY | Age: 73
DRG: 189 | End: 2023-02-08
Payer: MEDICARE

## 2023-02-08 LAB
ALBUMIN SERPL-MCNC: 3.7 G/DL (ref 3.5–5.2)
ALP BLD-CCNC: 74 U/L (ref 35–104)
ALT SERPL-CCNC: 20 U/L (ref 0–32)
ANION GAP SERPL CALCULATED.3IONS-SCNC: 6 MMOL/L (ref 7–16)
AST SERPL-CCNC: 18 U/L (ref 0–31)
B.E.: 5.3 MMOL/L (ref -3–3)
BASOPHILS ABSOLUTE: 0 E9/L (ref 0–0.2)
BASOPHILS RELATIVE PERCENT: 0 % (ref 0–2)
BILIRUB SERPL-MCNC: <0.2 MG/DL (ref 0–1.2)
BUN BLDV-MCNC: 20 MG/DL (ref 6–23)
CALCIUM SERPL-MCNC: 9 MG/DL (ref 8.6–10.2)
CHLORIDE BLD-SCNC: 103 MMOL/L (ref 98–107)
CO2: 31 MMOL/L (ref 22–29)
COHB: 1.2 % (ref 0–1.5)
CREAT SERPL-MCNC: 0.7 MG/DL (ref 0.5–1)
CRITICAL: ABNORMAL
DATE ANALYZED: ABNORMAL
DATE OF COLLECTION: ABNORMAL
EOSINOPHILS ABSOLUTE: 0 E9/L (ref 0.05–0.5)
EOSINOPHILS RELATIVE PERCENT: 0 % (ref 0–6)
GFR SERPL CREATININE-BSD FRML MDRD: >60 ML/MIN/1.73
GLUCOSE BLD-MCNC: 115 MG/DL (ref 74–99)
HCO3: 32.2 MMOL/L (ref 22–26)
HCT VFR BLD CALC: 41.1 % (ref 34–48)
HEMOGLOBIN: 12.5 G/DL (ref 11.5–15.5)
HHB: 5.4 % (ref 0–5)
LAB: ABNORMAL
LYMPHOCYTES ABSOLUTE: 0.85 E9/L (ref 1.5–4)
LYMPHOCYTES RELATIVE PERCENT: 6.9 % (ref 20–42)
Lab: ABNORMAL
MAGNESIUM: 2 MG/DL (ref 1.6–2.6)
MCH RBC QN AUTO: 29.5 PG (ref 26–35)
MCHC RBC AUTO-ENTMCNC: 30.4 % (ref 32–34.5)
MCV RBC AUTO: 96.9 FL (ref 80–99.9)
METHB: 0.3 % (ref 0–1.5)
MODE: ABNORMAL
MONOCYTES ABSOLUTE: 0.48 E9/L (ref 0.1–0.95)
MONOCYTES RELATIVE PERCENT: 3.5 % (ref 2–12)
MYELOCYTE PERCENT: 0.9 % (ref 0–0)
NEUTROPHILS ABSOLUTE: 10.89 E9/L (ref 1.8–7.3)
NEUTROPHILS RELATIVE PERCENT: 88.7 % (ref 43–80)
NUCLEATED RED BLOOD CELLS: 0 /100 WBC
O2 CONTENT: 18.5 ML/DL
O2 SATURATION: 94.5 % (ref 92–98.5)
O2HB: 93.1 % (ref 94–97)
OPERATOR ID: 1741
OVALOCYTES: ABNORMAL
PATIENT TEMP: 37 C
PCO2: 56.8 MMHG (ref 35–45)
PDW BLD-RTO: 13.2 FL (ref 11.5–15)
PH BLOOD GAS: 7.37 (ref 7.35–7.45)
PHOSPHORUS: 2.5 MG/DL (ref 2.5–4.5)
PLATELET # BLD: 254 E9/L (ref 130–450)
PMV BLD AUTO: 10.8 FL (ref 7–12)
PO2: 67.5 MMHG (ref 75–100)
POTASSIUM SERPL-SCNC: 4.7 MMOL/L (ref 3.5–5)
RBC # BLD: 4.24 E12/L (ref 3.5–5.5)
SODIUM BLD-SCNC: 140 MMOL/L (ref 132–146)
SOURCE, BLOOD GAS: ABNORMAL
TARGET CELLS: ABNORMAL
THB: 14.1 G/DL (ref 11.5–16.5)
TIME ANALYZED: 923
TOTAL PROTEIN: 6.6 G/DL (ref 6.4–8.3)
WBC # BLD: 12.1 E9/L (ref 4.5–11.5)

## 2023-02-08 PROCEDURE — 36415 COLL VENOUS BLD VENIPUNCTURE: CPT

## 2023-02-08 PROCEDURE — 2580000003 HC RX 258: Performed by: FAMILY MEDICINE

## 2023-02-08 PROCEDURE — 6370000000 HC RX 637 (ALT 250 FOR IP): Performed by: INTERNAL MEDICINE

## 2023-02-08 PROCEDURE — 6370000000 HC RX 637 (ALT 250 FOR IP)

## 2023-02-08 PROCEDURE — 6360000002 HC RX W HCPCS: Performed by: FAMILY MEDICINE

## 2023-02-08 PROCEDURE — 94640 AIRWAY INHALATION TREATMENT: CPT

## 2023-02-08 PROCEDURE — 36600 WITHDRAWAL OF ARTERIAL BLOOD: CPT

## 2023-02-08 PROCEDURE — 6360000002 HC RX W HCPCS: Performed by: INTERNAL MEDICINE

## 2023-02-08 PROCEDURE — 94660 CPAP INITIATION&MGMT: CPT

## 2023-02-08 PROCEDURE — 99233 SBSQ HOSP IP/OBS HIGH 50: CPT | Performed by: FAMILY MEDICINE

## 2023-02-08 PROCEDURE — 80053 COMPREHEN METABOLIC PANEL: CPT

## 2023-02-08 PROCEDURE — 83735 ASSAY OF MAGNESIUM: CPT

## 2023-02-08 PROCEDURE — 2580000003 HC RX 258: Performed by: INTERNAL MEDICINE

## 2023-02-08 PROCEDURE — 2500000003 HC RX 250 WO HCPCS: Performed by: INTERNAL MEDICINE

## 2023-02-08 PROCEDURE — 2060000000 HC ICU INTERMEDIATE R&B

## 2023-02-08 PROCEDURE — 71045 X-RAY EXAM CHEST 1 VIEW: CPT

## 2023-02-08 PROCEDURE — A4216 STERILE WATER/SALINE, 10 ML: HCPCS | Performed by: INTERNAL MEDICINE

## 2023-02-08 PROCEDURE — 2700000000 HC OXYGEN THERAPY PER DAY

## 2023-02-08 PROCEDURE — 6370000000 HC RX 637 (ALT 250 FOR IP): Performed by: FAMILY MEDICINE

## 2023-02-08 PROCEDURE — 2500000003 HC RX 250 WO HCPCS: Performed by: FAMILY MEDICINE

## 2023-02-08 PROCEDURE — 84100 ASSAY OF PHOSPHORUS: CPT

## 2023-02-08 PROCEDURE — 82805 BLOOD GASES W/O2 SATURATION: CPT

## 2023-02-08 PROCEDURE — 85025 COMPLETE CBC W/AUTO DIFF WBC: CPT

## 2023-02-08 RX ORDER — NICOTINE 21 MG/24HR
1 PATCH, TRANSDERMAL 24 HOURS TRANSDERMAL DAILY
Status: DISCONTINUED | OUTPATIENT
Start: 2023-02-08 | End: 2023-02-08

## 2023-02-08 RX ORDER — NICOTINE 21 MG/24HR
1 PATCH, TRANSDERMAL 24 HOURS TRANSDERMAL DAILY
Status: DISCONTINUED | OUTPATIENT
Start: 2023-02-08 | End: 2023-02-09

## 2023-02-08 RX ORDER — METHYLPREDNISOLONE SODIUM SUCCINATE 40 MG/ML
40 INJECTION, POWDER, LYOPHILIZED, FOR SOLUTION INTRAMUSCULAR; INTRAVENOUS EVERY 12 HOURS
Status: DISCONTINUED | OUTPATIENT
Start: 2023-02-08 | End: 2023-02-09

## 2023-02-08 RX ORDER — ESCITALOPRAM OXALATE 10 MG/1
10 TABLET ORAL DAILY
Status: DISCONTINUED | OUTPATIENT
Start: 2023-02-08 | End: 2023-02-13 | Stop reason: HOSPADM

## 2023-02-08 RX ORDER — LORAZEPAM 0.5 MG/1
0.5 TABLET ORAL 2 TIMES DAILY PRN
Status: DISPENSED | OUTPATIENT
Start: 2023-02-08 | End: 2023-02-09

## 2023-02-08 RX ADMIN — IPRATROPIUM BROMIDE AND ALBUTEROL SULFATE 1 AMPULE: 2.5; .5 SOLUTION RESPIRATORY (INHALATION) at 11:20

## 2023-02-08 RX ADMIN — METHYLPREDNISOLONE SODIUM SUCCINATE 40 MG: 40 INJECTION, POWDER, LYOPHILIZED, FOR SOLUTION INTRAMUSCULAR; INTRAVENOUS at 04:41

## 2023-02-08 RX ADMIN — ESCITALOPRAM 10 MG: 10 TABLET, FILM COATED ORAL at 10:32

## 2023-02-08 RX ADMIN — ARFORMOTEROL TARTRATE 15 MCG: 15 SOLUTION RESPIRATORY (INHALATION) at 08:11

## 2023-02-08 RX ADMIN — ARFORMOTEROL TARTRATE 15 MCG: 15 SOLUTION RESPIRATORY (INHALATION) at 19:55

## 2023-02-08 RX ADMIN — ENOXAPARIN SODIUM 40 MG: 100 INJECTION SUBCUTANEOUS at 08:55

## 2023-02-08 RX ADMIN — SALINE NASAL SPRAY 1 SPRAY: 1.5 SOLUTION NASAL at 10:36

## 2023-02-08 RX ADMIN — SODIUM CHLORIDE, PRESERVATIVE FREE 10 ML: 5 INJECTION INTRAVENOUS at 20:39

## 2023-02-08 RX ADMIN — BUDESONIDE 500 MCG: 0.5 SUSPENSION RESPIRATORY (INHALATION) at 19:55

## 2023-02-08 RX ADMIN — METHYLPREDNISOLONE SODIUM SUCCINATE 40 MG: 40 INJECTION, POWDER, FOR SOLUTION INTRAMUSCULAR; INTRAVENOUS at 16:34

## 2023-02-08 RX ADMIN — SODIUM CHLORIDE, PRESERVATIVE FREE 20 MG: 5 INJECTION INTRAVENOUS at 20:33

## 2023-02-08 RX ADMIN — IPRATROPIUM BROMIDE AND ALBUTEROL SULFATE 1 AMPULE: 2.5; .5 SOLUTION RESPIRATORY (INHALATION) at 19:55

## 2023-02-08 RX ADMIN — AZITHROMYCIN 500 MG: 250 TABLET, FILM COATED ORAL at 08:55

## 2023-02-08 RX ADMIN — SODIUM CHLORIDE, PRESERVATIVE FREE 10 ML: 5 INJECTION INTRAVENOUS at 08:58

## 2023-02-08 RX ADMIN — IPRATROPIUM BROMIDE AND ALBUTEROL SULFATE 1 AMPULE: 2.5; .5 SOLUTION RESPIRATORY (INHALATION) at 16:26

## 2023-02-08 RX ADMIN — LORAZEPAM 0.5 MG: 0.5 TABLET ORAL at 11:15

## 2023-02-08 RX ADMIN — IPRATROPIUM BROMIDE AND ALBUTEROL SULFATE 1 AMPULE: 2.5; .5 SOLUTION RESPIRATORY (INHALATION) at 08:11

## 2023-02-08 RX ADMIN — SODIUM CHLORIDE, PRESERVATIVE FREE 20 MG: 5 INJECTION INTRAVENOUS at 08:55

## 2023-02-08 RX ADMIN — BUDESONIDE 500 MCG: 0.5 SUSPENSION RESPIRATORY (INHALATION) at 08:11

## 2023-02-08 ASSESSMENT — PAIN SCALES - GENERAL
PAINLEVEL_OUTOF10: 0

## 2023-02-08 NOTE — CARE COORDINATION
Social work:    Notified [facility] at [facility] of admission and possible NIV need.     Electronically signed by MIR Ulloa on 2/8/2023 at 1:13 PM

## 2023-02-08 NOTE — PLAN OF CARE
Problem: Discharge Planning  Goal: Discharge to home or other facility with appropriate resources  2/8/2023 0112 by Sarabjit Sheldon RN  Outcome: Not Progressing  2/7/2023 1115 by Eve Staton RN  Outcome: Progressing  Flowsheets (Taken 2/7/2023 0463)  Discharge to home or other facility with appropriate resources: Identify discharge learning needs (meds, wound care, etc)     Problem: Chronic Conditions and Co-morbidities  Goal: Patient's chronic conditions and co-morbidity symptoms are monitored and maintained or improved  2/8/2023 0112 by Sarabjit Sheldon RN  Outcome: Progressing  2/7/2023 1115 by Eve Staton RN  Outcome: Not Progressing     Problem: Discharge Planning  Goal: Discharge to home or other facility with appropriate resources  2/8/2023 0112 by Sarabjit Sheldon RN  Outcome: Not Progressing  2/7/2023 1115 by Eve Staton RN  Outcome: Progressing  Flowsheets (Taken 2/7/2023 4997)  Discharge to home or other facility with appropriate resources: Identify discharge learning needs (meds, wound care, etc)     Problem: Chronic Conditions and Co-morbidities  Goal: Patient's chronic conditions and co-morbidity symptoms are monitored and maintained or improved  2/8/2023 0112 by Sarabjit Sheldon RN  Outcome: Progressing  2/7/2023 1115 by Eve Staton RN  Outcome: Not Progressing

## 2023-02-08 NOTE — CARE COORDINATION
Social Work:    Reviewed chart notes. Patient currently on 5 liter NC, AVAPS nightly. DNR-CCA. Social service attempted to meet with Mrs. Radha Goldberg [facility] in [place] but she was asleep. Social service met with her  Walter Butcher at bedside, advised him about social service &  roles, as well as discussed discharge planning. Raghu advises that Yesenia Redding is a patient of Dr. Danyelle Armando and uses [employer] in [facility]. Yesenia Redding was independent with ADl's prior to admission and has 02 (4 liters) from [employer], as well as a nebulizer. Walter Butcher thinks they may have a walker at home but he is not certain. He advises that Yesenia Redding has never used [facility] [address] or SNF, however, Walter Butcher states that they are receptive to whatever is recommended. PT/OT will need ordered if appropriate. Will follow.     Electronically signed by MIR Menjivar on 2/8/2023 at 1:04 PM

## 2023-02-08 NOTE — PROGRESS NOTES
Date: 2/7/2023    Time: 11:56 PM    Patient Placed On BIPAP/CPAP/ Non-Invasive Ventilation? Yes    If no must comment. Facial area red/color change? No           If YES are Blister/Lesion present? No   If yes must notify nursing staff  BIPAP/CPAP skin barrier? Yes    Skin barrier type:mepilexlite       Comments: Pt placed on AVAPS for the night. Min and Max pressures were able to be decreased to achieve tidal volumes. Machine plugged in to red outlet and outside alarm plugged in.         Maged Lopez RCP    02/07/23 2300   NIV Type   Mode AVAPS   Mask Type Full face mask   Mask Size Small   Settings/Measurements   PIP Observed 23 cm H20   CPAP/EPAP 5 cmH2O   IPAP Min 16 cmH2O   IPAP Max 26 cmH2O   Vt (Set, mL) 450 mL   Vt (Measured) 489 mL   Rate Ordered 16   Resp 16   O2 Flow Rate (L/min) 5 L/min   FiO2  35 %   Minute Volume (L/min) 9.3 Liters   Mask Leak (lpm) 29 lpm   Comfort Level Good   Using Accessory Muscles No

## 2023-02-08 NOTE — PROGRESS NOTES
Patient reports that she would strongly prefer female nurse/staff due to prior experiences in hospital.

## 2023-02-08 NOTE — PROGRESS NOTES
[facility] ER Progress Note    Admitting Date and Time: 2/6/2023 11:31 PM  Admit Dx: Hypercapnia [R06.89]  COPD exacerbation (HCC) [J44.1]  SIRS (systemic inflammatory response syndrome) (HCC) [R65.10]  D-dimer, elevated [R79.89]  Acute on chronic respiratory failure with hypoxia and hypercapnia (HCC) [J96.21, J96.22]  Leukocytosis, unspecified type [D72.829]    Subjective:  Patient is being followed for Hypercapnia [R06.89]  COPD exacerbation ([address] [J44.1]  SIRS (systemic inflammatory response syndrome) (HCC) [R65.10]  D-dimer, elevated [R79.89]  Acute on chronic respiratory failure with hypoxia and hypercapnia (HCC) [J96.21, J96.22]  Leukocytosis, unspecified type [D72.829]   Pt feels much better. Off NIV; on baseline of 4 L via NC. Nose feels dry. Want to get up out of bed and walk. Per RN: education on pulmonary issues. ROS: denies fever, chills, cp, sob, n/v, HA unless stated above.      sodium chloride flush  5-40 mL IntraVENous 2 times per day    enoxaparin  40 mg SubCUTAneous Daily    famotidine (PEPCID) injection  20 mg IntraVENous BID    ipratropium-albuterol  1 ampule Inhalation Q4H WA    budesonide  0.5 mg Nebulization BID    arformoterol tartrate  15 mcg Nebulization BID    methylPREDNISolone  40 mg IntraVENous Q6H    azithromycin  500 mg Oral Daily     sodium chloride flush, 5-40 mL, PRN  sodium chloride, , PRN  ondansetron, 4 mg, Q8H PRN   Or  ondansetron, 4 mg, Q6H PRN  acetaminophen, 650 mg, Q6H PRN   Or  acetaminophen, 650 mg, Q6H PRN  bisacodyl, 10 mg, Daily PRN         Objective:    BP (!) 134/97   Pulse 95   Temp 97.9 °F (36.6 °C) (Oral)   Resp (!) 33   Ht 5' 2\" (1.575 m)   Wt 187 lb (84.8 kg)   SpO2 94%   BMI 34.20 kg/m²     General Appearance: alert and oriented to person, place and time and in no acute distress.   SOB noted with conversation  Skin: warm and dry  Head: normocephalic and atraumatic  Eyes: pupils equal, round, and reactive to light, extraocular eye movements intact, conjunctivae normal  Neck: neck supple and non tender without mass   Pulmonary/Chest: bilateral end expiratory wheezing noted. Chest retractions present. SOB with conversation. Mild respiratory distress  Cardiovascular: normal rate, normal S1 and S2 and no carotid bruits  Abdomen: soft, non-tender, non-distended, normal bowel sounds, no masses or organomegaly  Extremities: no cyanosis, no clubbing and no edema  Neurologic: no cranial nerve deficit and speech normal        Recent Labs     02/07/23  0116 02/08/23  0445    140   K 4.6 4.7   CL 99 103   CO2 27 31*   BUN 17 20   CREATININE 0.7 0.7   GLUCOSE 140* 115*   CALCIUM 9.4 9.0       Recent Labs     02/07/23  0116 02/08/23  0445   WBC 14.3* 12.1*   RBC 4.88 4.24   HGB 14.5 12.5   HCT 46.7 41.1   MCV 95.7 96.9   MCH 29.7 29.5   MCHC 31.0* 30.4*   RDW 13.2 13.2    254   MPV 9.7 10.8       Radiology:   XR CHEST PORTABLE    Result Date: 2/8/2023  EXAMINATION: ONE XRAY VIEW OF THE CHEST 2/8/2023 6:04 am COMPARISON: 7 February 2023 HISTORY: ORDERING SYSTEM PROVIDED HISTORY: resp failure TECHNOLOGIST PROVIDED HISTORY: Reason for exam:->resp failure FINDINGS: The lungs are without acute focal process. There is no effusion or pneumothorax. The cardiomediastinal silhouette is without acute process. The osseous structures are without acute process. No acute process. XR CHEST PORTABLE    Result Date: 2/7/2023  EXAMINATION: ONE XRAY VIEW OF THE CHEST 2/7/2023 12:21 am COMPARISON: 01/15/2023 HISTORY: ORDERING SYSTEM PROVIDED HISTORY: SOB TECHNOLOGIST PROVIDED HISTORY: Reason for exam:->SOB FINDINGS: The lungs are without acute focal process. There is no effusion or pneumothorax. The cardiomediastinal silhouette is without acute process. The osseous structures are without acute process. No acute process.      CTA PULMONARY W CONTRAST    Result Date: 2/7/2023  EXAMINATION: CTA OF THE CHEST 2/7/2023 5:56 am TECHNIQUE: CTA of the chest was performed after the administration of intravenous contrast.  Multiplanar reformatted images are provided for review. MIP images are provided for review. Automated exposure control, iterative reconstruction, and/or weight based adjustment of the mA/kV was utilized to reduce the radiation dose to as low as reasonably achievable. COMPARISON: None. HISTORY: ORDERING SYSTEM PROVIDED HISTORY: Cute on chronic respiratory failure, elevated D-dimer, concern for pulmonary embolism TECHNOLOGIST PROVIDED HISTORY: Reason for exam:->Cute on chronic respiratory failure, elevated D-dimer, concern for pulmonary embolism Decision Support Exception - unselect if not a suspected or confirmed emergency medical condition->Emergency Medical Condition ([place]) FINDINGS: Pulmonary Arteries: Pulmonary arteries are adequately opacified for evaluation. No evidence of intraluminal filling defect to suggest pulmonary embolism. Main pulmonary artery is normal in caliber. Mediastinum: No evidence of mediastinal lymphadenopathy. The heart and pericardium demonstrate no acute abnormality. There is no acute abnormality of the thoracic aorta. Lungs/pleura: The lungs are without acute process. No focal consolidation or pulmonary edema. Upper lobe predominant emphysematous change. No evidence of pleural effusion or pneumothorax. Upper Abdomen:  Limited images of the upper abdomen demonstrate no acute abnormality. Soft Tissues/Bones: No acute bone or soft tissue abnormality. No evidence of pulmonary embolism or acute pulmonary abnormality. Assessment:    Principal Problem:    Hypercapnia  Resolved Problems:    * No resolved hospital problems. *      Plan:  1. Acute Respiratory Failure with Hypercapnia - patient was feeling better. Pulmonary consult and Intensivist recommendations appreciated. Needs follow up with Pulmonologist and Pulmonary rehab as outpatient.   2.   Anxiety - has been on Ativan 0.5 mg po bid and Pristiq 50 mg daily. To continue Ativan oral and resume Pristiq when able to take oral well. 3.  Tobacco Use disorder - patient was not abstinent. Continue Nicoderm patch 21 mg daily. 4.  Hyperlipidemia - on Lipitor 20 mg daily. Continue same. 5.  Obesity - BMI 34.2. Supportive care. Component of Obesity Hypoventilation syndrome. Continue respiratory support. NOTE: This report was transcribed using voice recognition software. Every effort was made to ensure accuracy; however, inadvertent computerized transcription errors may be present.     Electronically signed by Evaristo Garcia MD on 2/8/2023 at 9:40 AM

## 2023-02-08 NOTE — PROGRESS NOTES
Patient transferring from ICU room 213 to 630, report called to charge RN, placed on telepack 630, patient belongings consisting of tablet, cell phone, all other belongings were sent home with . transported with patient.

## 2023-02-08 NOTE — PATIENT CARE CONFERENCE
..  Intensive Care Daily Quality Rounding Checklist      ICU Team Members: Dr. Cyn Addison, Dea Vee, charge nurse, bedside nurse, respiratory therapist    ICU Day #: NUMBER: 2    Intubation Date:  n/a    Ventilator Day #: n/a    Central Line Insertion Date:  n/a        Day #: n/a        Indication: n/a     Arterial Line Insertion Date:  n/a      Day #: n/a     Temporary Hemodialysis Catheter Insertion Date:  n/a      Day # n/a    DVT Prophylaxis: Lovenox    GI Prophylaxis: Pepcid    Rice Catheter Insertion Date: n/a       Day #: n/a      Indications: n/a      Continued need (if yes, reason documented and discussed with physician): n/a    Skin Issues/ Wounds and ordered treatment discussed on rounds: no issues    Goals/ Plans for the Day: Daily Labs - Report Results, advance activity, wean O2, transfer to floor today

## 2023-02-08 NOTE — PROGRESS NOTES
Critical Care Team - Daily Progress Note      Date and time: 2/8/2023 8:36 AM  Patient's name:  Truman Montes  Medical Record Number: [id number]  Patient's account/billing number: [de-identified]  Patient's YOB: 1950  Age: 67 y.o. Date of Admission: 2/6/2023 11:31 PM  Length of stay during current admission: 1      Primary Care Physician: Gutierrez Kate MD  ICU Attending Physician: Dr. Raina Sánchez    Code Status: Full Code    Reason for ICU admission: COPD exacerbation      SUBJECTIVE:     OVERNIGHT EVENTS:           02/08: Patient ABG has improved. She is currently on 5L NC. Will continue AVAPS nightly. Solumedrol changed to q12, nicotine patch and ativan added. Code status discussed patient is DNR-CCA. Tolerating diet. She is stable for transfer out of ICU. Intake/Output:   No intake/output data recorded. I/O last 3 completed shifts: In: 516.3 [P.O.:420; IV Piggyback:96.3]  Out: 1000 [Urine:1000]      ROS:  Unless stated above, ROS is otherwise negative. Initial HPI + past overnight events: The patient is a 67 y.o. female with significant past medical history of COPD  presents to the emergency department for wheezing and shortness of breath that started around Wednesday and has gotten worse ever since. Symptoms are worsened with movement and slightly better with rest.  She has tried breathing treatments at home with no relief. She was recently admitted for COPD exacerbation from January 14 January to 17, 2023. She stopped steroid therapy on the 19th. She is not on antibiotics. No known sick contacts. No changes in cough frequency and sputum.   She also denies the following: Chest pain, fever, chills, Braden pain, nausea, vomiting, diaphoresis, jaw pain, focal weakness/numbness in the extremities, Rashes, pruritus, history of blood clots, recent surgeries, hemoptysis, pleuritic chest pain, leg edema/erythema/tenderness, estrogen therapy, history of cancer history of chemotherapy. Patient did say she chronically wears 4 L nasal cannula since being discharged and had to increase her to 5 L today. Per EMS report patient was in the high 70s low 80% on room air. In ER, pt was put on BIPAP, got dose of solu-medrol, breathing treatment, doxy and Rocephin. Repeat ABGs showed worsening in respiratory acidosis with with pCO2 of 90. 2. pt was shifted to AVAPs from BIPAP and shifted to ICU for further management. OBJECTIVE:     VITAL SIGNS:  BP (!) 154/79   Pulse 90   Temp 97.9 °F (36.6 °C) (Oral)   Resp 26   Ht 5' 2\" (1.575 m)   Wt 187 lb (84.8 kg)   SpO2 95%   BMI 34.20 kg/m²   Tmax over 24 hours:  Temp (24hrs), Av.8 °F (36.6 °C), Min:97.4 °F (36.3 °C), Max:98.3 °F (36.8 °C)      Patient Vitals for the past 6 hrs:   BP Temp Temp src Pulse Resp SpO2   23 0812 -- -- -- 90 26 95 %   23 0811 -- -- -- 89 26 96 %   23 0800 (!) 154/79 97.9 °F (36.6 °C) Oral 86 17 95 %   23 0700 (!) 144/82 -- -- 81 18 98 %   23 0600 (!) 150/83 -- -- 83 26 98 %   23 0500 (!) 191/89 -- -- 96 21 92 %   23 0400 (!) 121/52 -- -- 76 22 96 %   23 0300 (!) 109/53 -- -- 73 16 97 %         Intake/Output Summary (Last 24 hours) at 2023 0836  Last data filed at 2023 0400  Gross per 24 hour   Intake 516.25 ml   Output 1000 ml   Net -483.75 ml     Wt Readings from Last 2 Encounters:   23 187 lb (84.8 kg)   23 187 lb (84.8 kg)     Body mass index is 34.2 kg/m².       PHYSICAL EXAM:  General appearance - alert, well appearing, and in no distress, eating breakfast on 5L NC  Mental status - alert, oriented to person, place, and time  Eyes - pupils equal and reactive, extraocular eye movements intact  Ears - bilateral TM's and external ear canals normal  Nose - normal and patent, no erythema, discharge or polyps  Mouth - mucous membranes moist, pharynx normal without lesions  Neck - supple, no significant adenopathy  Chest - Prominent wheeze and rales present all over lung field B/L. Heart - normal rate, regular rhythm, normal S1, S2, no murmurs, rubs, clicks or gallops  Abdomen - soft, nontender, nondistended, no masses or organomegaly  Neurological - alert, oriented, normal speech, no focal findings or movement disorder noted}  Extremities - peripheral pulses normal, no pedal edema, no clubbing or cyanosis  Skin - normal coloration and turgor, no rashes, no suspicious skin lesions noted       MEDICATIONS:    Scheduled Meds:   sodium chloride flush  5-40 mL IntraVENous 2 times per day    enoxaparin  40 mg SubCUTAneous Daily    famotidine (PEPCID) injection  20 mg IntraVENous BID    ipratropium-albuterol  1 ampule Inhalation Q4H WA    budesonide  0.5 mg Nebulization BID    arformoterol tartrate  15 mcg Nebulization BID    methylPREDNISolone  40 mg IntraVENous Q6H    azithromycin  500 mg Oral Daily     Continuous Infusions:   sodium chloride       PRN Meds:   sodium chloride flush, 5-40 mL, PRN  sodium chloride, , PRN  ondansetron, 4 mg, Q8H PRN   Or  ondansetron, 4 mg, Q6H PRN  acetaminophen, 650 mg, Q6H PRN   Or  acetaminophen, 650 mg, Q6H PRN  bisacodyl, 10 mg, Daily PRN          VENT SETTINGS (Comprehensive) (if applicable):      Additional Respiratory Assessments  Heart Rate: 90  Resp: 26  SpO2: 95 %    Arterial Blood Gas 2/8/2023  Recent Labs     02/07/23  0958   PH 7.270*   PCO2 62.7*   PO2 103.7*   HCO3 28.1*   BE -0.1   O2SAT 97.9         Laboratory findings:    Complete Blood Count:   Recent Labs     02/07/23  0116 02/08/23  0445   WBC 14.3* 12.1*   HGB 14.5 12.5   HCT 46.7 41.1    254        Last 3 Blood Glucose:   Recent Labs     02/07/23  0116 02/08/23  0445   GLUCOSE 140* 115*        PT/INR:  No results found for: PROTIME, INR  PTT:  No results found for: APTT, PTT    Comprehensive Metabolic Profile:   Recent Labs     02/07/23  0116 02/08/23  0445    140   K 4.6 4.7   CL 99 103   CO2 27 31*   BUN 17 20   CREATININE 0.7 0.7 GLUCOSE 140* 115*   CALCIUM 9.4 9.0   PROT  --  6.6   LABALBU  --  3.7   BILITOT  --  <0.2   ALKPHOS  --  74   AST  --  18   ALT  --  20      Magnesium:   Lab Results   Component Value Date/Time    MG 2.0 02/08/2023 04:45 AM     Phosphorus:   Lab Results   Component Value Date/Time    PHOS 2.5 02/08/2023 04:45 AM     Ionized Calcium: No results found for: CAION     Urinalysis:     Troponin: No results for input(s): TROPONINI in the last 72 hours. XR CHEST PORTABLE   Final Result   No acute process. CTA PULMONARY W CONTRAST   Final Result   No evidence of pulmonary embolism or acute pulmonary abnormality. XR CHEST PORTABLE   Final Result   No acute process. XR CHEST PORTABLE    (Results Pending)         ASSESSMENT:     Patient Active Problem List    Diagnosis Date Noted    Hypercapnia 02/07/2023    COPD exacerbation ([facility] [address] 01/14/2023    Severe chronic obstructive pulmonary disease ([facility] [address] 07/18/2022    Major depressive disorder, single episode, mild ([facility] [address] 04/21/2021    Rheumatoid arthritis ([facility] [address] 07/11/2014    Hyperlipidemia 07/11/2014    Asthma 07/11/2014    Tobacco use disorder 07/05/2005         PLAN:     Neuro   No acute issues  AAOx3. Respiratory   Acute hypercapnic respiratory failure 2/2 COPD exacerbation and ativan in ED- improving  NIVM while asleep, 5L NC while awake  ABGs improving   pH 7.19>7.27>7.371  pCO2 90.2> 62.7>56.8   Sp 1 dose solumedrol, rocephin, doxy in ER. Solumedrol 40 mg q12  Z-he day 2  Pulmonary hygiene: Pulmicort, Brovana, Duoneb. Resp cx and RFA negative  Wean oxygen as tolerated. Keep O2 sat 90-92%  Dr. Nora Lindquist consulted as it is his patient    Cardiovascular   no acute events. Troponin 15--> 13. Gastrointestinal   Adult regular diet. Pepcid ppx     Renal   no acute events. Infectious Disease   No acute events. Hematology/Oncology   No acute events.       Social/Spiritual/DNR/Other     Code status: Full Code  Diet ADULT DIET; Regular  Stress ulcer prophylaxis: pepcid  DVT prophylaxis: pharmacologic prophylaxis (with the following: enoxaparin)  Consultations needed: Yes Pulm  Transfer out of ICU today? Yes    Lines/catheters  Peripheral IVs    Patient does not require ICU level care. Marleni Contreras MD  8:36 AM  02/08/23       I personally saw, examined and provided care for the patient. Radiographs, labs and medication list were reviewed by me independently. Review of Residents documentation was conducted and revisions were made as appropriate. I agree with the above documented exam, problem list and plan of care.     Clinically improved  Tolerated NIV overnight    Continue treatment for COPD exacerbation  Anxiety remains significant contributing factor  We will decrease steroids as she is getting more anxious  Tobacco cessation, nicotine patch  Pulmonary following      Juliann Layne, DO

## 2023-02-09 ENCOUNTER — APPOINTMENT (OUTPATIENT)
Dept: GENERAL RADIOLOGY | Age: 73
DRG: 189 | End: 2023-02-09
Payer: MEDICARE

## 2023-02-09 LAB
ALBUMIN SERPL-MCNC: 3.5 G/DL (ref 3.5–5.2)
ALP BLD-CCNC: 69 U/L (ref 35–104)
ALT SERPL-CCNC: 20 U/L (ref 0–32)
ANION GAP SERPL CALCULATED.3IONS-SCNC: 8 MMOL/L (ref 7–16)
AST SERPL-CCNC: 16 U/L (ref 0–31)
B.E.: 8.1 MMOL/L (ref -3–3)
BASOPHILS ABSOLUTE: 0.03 E9/L (ref 0–0.2)
BASOPHILS RELATIVE PERCENT: 0.3 % (ref 0–2)
BILIRUB SERPL-MCNC: 0.2 MG/DL (ref 0–1.2)
BUN BLDV-MCNC: 25 MG/DL (ref 6–23)
CALCIUM SERPL-MCNC: 9.2 MG/DL (ref 8.6–10.2)
CHLORIDE BLD-SCNC: 104 MMOL/L (ref 98–107)
CO2: 30 MMOL/L (ref 22–29)
COHB: 1.1 % (ref 0–1.5)
CREAT SERPL-MCNC: 0.8 MG/DL (ref 0.5–1)
CRITICAL: ABNORMAL
DATE ANALYZED: ABNORMAL
DATE OF COLLECTION: ABNORMAL
EOSINOPHILS ABSOLUTE: 0 E9/L (ref 0.05–0.5)
EOSINOPHILS RELATIVE PERCENT: 0 % (ref 0–6)
FIO2: 35 %
GFR SERPL CREATININE-BSD FRML MDRD: >60 ML/MIN/1.73
GLUCOSE BLD-MCNC: 121 MG/DL (ref 74–99)
HCO3: 34.2 MMOL/L (ref 22–26)
HCT VFR BLD CALC: 38.1 % (ref 34–48)
HEMOGLOBIN: 12 G/DL (ref 11.5–15.5)
HHB: 3 % (ref 0–5)
HYPOCHROMIA: ABNORMAL
IMMATURE GRANULOCYTES #: 0.06 E9/L
IMMATURE GRANULOCYTES %: 0.6 % (ref 0–5)
LAB: ABNORMAL
LYMPHOCYTES ABSOLUTE: 0.59 E9/L (ref 1.5–4)
LYMPHOCYTES RELATIVE PERCENT: 5.5 % (ref 20–42)
Lab: ABNORMAL
MAGNESIUM: 2.3 MG/DL (ref 1.6–2.6)
MCH RBC QN AUTO: 29.8 PG (ref 26–35)
MCHC RBC AUTO-ENTMCNC: 31.5 % (ref 32–34.5)
MCV RBC AUTO: 94.5 FL (ref 80–99.9)
METHB: 0.3 % (ref 0–1.5)
MODE: ABNORMAL
MONOCYTES ABSOLUTE: 0.48 E9/L (ref 0.1–0.95)
MONOCYTES RELATIVE PERCENT: 4.5 % (ref 2–12)
MRSA CULTURE ONLY: NORMAL
NEUTROPHILS ABSOLUTE: 9.6 E9/L (ref 1.8–7.3)
NEUTROPHILS RELATIVE PERCENT: 89.1 % (ref 43–80)
O2 CONTENT: 16.8 ML/DL
O2 SATURATION: 97 % (ref 92–98.5)
O2HB: 95.6 % (ref 94–97)
OPERATOR ID: 789
PATIENT TEMP: 37 C
PCO2: 54.7 MMHG (ref 35–45)
PDW BLD-RTO: 13.3 FL (ref 11.5–15)
PEEP/CPAP: 5 CMH2O
PFO2: 2.71 MMHG/%
PH BLOOD GAS: 7.41 (ref 7.35–7.45)
PHOSPHORUS: 2.3 MG/DL (ref 2.5–4.5)
PLATELET # BLD: 232 E9/L (ref 130–450)
PMV BLD AUTO: 10.3 FL (ref 7–12)
PO2: 94.7 MMHG (ref 75–100)
POIKILOCYTES: ABNORMAL
POTASSIUM SERPL-SCNC: 4.5 MMOL/L (ref 3.5–5)
RBC # BLD: 4.03 E12/L (ref 3.5–5.5)
RI(T): 0.96
RR MECHANICAL: 16 B/MIN
SODIUM BLD-SCNC: 142 MMOL/L (ref 132–146)
SOURCE, BLOOD GAS: ABNORMAL
TARGET CELLS: ABNORMAL
THB: 12.4 G/DL (ref 11.5–16.5)
TIME ANALYZED: 531
TOTAL PROTEIN: 6.5 G/DL (ref 6.4–8.3)
VT MECHANICAL: 450 ML
WBC # BLD: 10.8 E9/L (ref 4.5–11.5)

## 2023-02-09 PROCEDURE — 97161 PT EVAL LOW COMPLEX 20 MIN: CPT

## 2023-02-09 PROCEDURE — 6370000000 HC RX 637 (ALT 250 FOR IP): Performed by: INTERNAL MEDICINE

## 2023-02-09 PROCEDURE — 83735 ASSAY OF MAGNESIUM: CPT

## 2023-02-09 PROCEDURE — 36415 COLL VENOUS BLD VENIPUNCTURE: CPT

## 2023-02-09 PROCEDURE — 82805 BLOOD GASES W/O2 SATURATION: CPT

## 2023-02-09 PROCEDURE — 6360000002 HC RX W HCPCS: Performed by: INTERNAL MEDICINE

## 2023-02-09 PROCEDURE — 80053 COMPREHEN METABOLIC PANEL: CPT

## 2023-02-09 PROCEDURE — 94660 CPAP INITIATION&MGMT: CPT

## 2023-02-09 PROCEDURE — 2580000003 HC RX 258: Performed by: INTERNAL MEDICINE

## 2023-02-09 PROCEDURE — 94640 AIRWAY INHALATION TREATMENT: CPT

## 2023-02-09 PROCEDURE — 71045 X-RAY EXAM CHEST 1 VIEW: CPT

## 2023-02-09 PROCEDURE — 99233 SBSQ HOSP IP/OBS HIGH 50: CPT | Performed by: FAMILY MEDICINE

## 2023-02-09 PROCEDURE — 84100 ASSAY OF PHOSPHORUS: CPT

## 2023-02-09 PROCEDURE — 2060000000 HC ICU INTERMEDIATE R&B

## 2023-02-09 PROCEDURE — 6370000000 HC RX 637 (ALT 250 FOR IP)

## 2023-02-09 PROCEDURE — 2700000000 HC OXYGEN THERAPY PER DAY

## 2023-02-09 PROCEDURE — 97165 OT EVAL LOW COMPLEX 30 MIN: CPT

## 2023-02-09 PROCEDURE — 85025 COMPLETE CBC W/AUTO DIFF WBC: CPT

## 2023-02-09 PROCEDURE — 97530 THERAPEUTIC ACTIVITIES: CPT

## 2023-02-09 PROCEDURE — 2500000003 HC RX 250 WO HCPCS: Performed by: INTERNAL MEDICINE

## 2023-02-09 RX ORDER — METHYLPREDNISOLONE SODIUM SUCCINATE 40 MG/ML
40 INJECTION, POWDER, LYOPHILIZED, FOR SOLUTION INTRAMUSCULAR; INTRAVENOUS DAILY
Status: COMPLETED | OUTPATIENT
Start: 2023-02-10 | End: 2023-02-12

## 2023-02-09 RX ORDER — NICOTINE 21 MG/24HR
1 PATCH, TRANSDERMAL 24 HOURS TRANSDERMAL DAILY
Status: DISCONTINUED | OUTPATIENT
Start: 2023-02-09 | End: 2023-02-13 | Stop reason: HOSPADM

## 2023-02-09 RX ADMIN — IPRATROPIUM BROMIDE AND ALBUTEROL SULFATE 1 AMPULE: 2.5; .5 SOLUTION RESPIRATORY (INHALATION) at 15:53

## 2023-02-09 RX ADMIN — BUDESONIDE 500 MCG: 0.5 SUSPENSION RESPIRATORY (INHALATION) at 21:02

## 2023-02-09 RX ADMIN — ENOXAPARIN SODIUM 40 MG: 100 INJECTION SUBCUTANEOUS at 09:20

## 2023-02-09 RX ADMIN — SODIUM CHLORIDE, PRESERVATIVE FREE 20 MG: 5 INJECTION INTRAVENOUS at 20:47

## 2023-02-09 RX ADMIN — ESCITALOPRAM 10 MG: 10 TABLET, FILM COATED ORAL at 09:20

## 2023-02-09 RX ADMIN — BUDESONIDE 500 MCG: 0.5 SUSPENSION RESPIRATORY (INHALATION) at 08:41

## 2023-02-09 RX ADMIN — IPRATROPIUM BROMIDE AND ALBUTEROL SULFATE 1 AMPULE: 2.5; .5 SOLUTION RESPIRATORY (INHALATION) at 21:02

## 2023-02-09 RX ADMIN — METHYLPREDNISOLONE SODIUM SUCCINATE 40 MG: 40 INJECTION, POWDER, FOR SOLUTION INTRAMUSCULAR; INTRAVENOUS at 05:35

## 2023-02-09 RX ADMIN — ARFORMOTEROL TARTRATE 15 MCG: 15 SOLUTION RESPIRATORY (INHALATION) at 08:41

## 2023-02-09 RX ADMIN — SODIUM CHLORIDE, PRESERVATIVE FREE 20 MG: 5 INJECTION INTRAVENOUS at 09:20

## 2023-02-09 RX ADMIN — IPRATROPIUM BROMIDE AND ALBUTEROL SULFATE 1 AMPULE: 2.5; .5 SOLUTION RESPIRATORY (INHALATION) at 12:26

## 2023-02-09 RX ADMIN — ARFORMOTEROL TARTRATE 15 MCG: 15 SOLUTION RESPIRATORY (INHALATION) at 21:02

## 2023-02-09 RX ADMIN — AZITHROMYCIN 500 MG: 250 TABLET, FILM COATED ORAL at 09:20

## 2023-02-09 RX ADMIN — IPRATROPIUM BROMIDE AND ALBUTEROL SULFATE 1 AMPULE: 2.5; .5 SOLUTION RESPIRATORY (INHALATION) at 08:41

## 2023-02-09 ASSESSMENT — PAIN SCALES - GENERAL: PAINLEVEL_OUTOF10: 0

## 2023-02-09 NOTE — PROGRESS NOTES
Occupational Therapy    OCCUPATIONAL THERAPY INITIAL EVALUATION     [address] [facility], [place]         [id number]                                                  Patient Name: Eli Castillo    MRN: [id number]    : 1950    Room: [address] [place], [place] [address]A      Evaluating OT: Arturo Prince OTR/L   [id number]      Referring Zuri Arcos MD    Specific Provider Orders/Date:OT eval and treat 2023      Diagnosis:  Hypercapnia [R06.89]  COPD exacerbation ([facility] [address] [J44.1]  SIRS (systemic inflammatory response syndrome) ([facility] [address] [R65.10]  D-dimer, elevated [R79.89]  Acute on chronic respiratory failure with hypoxia and hypercapnia ([facility] [address] [J96.21, J96.22]  Leukocytosis, unspecified type [D72.829]     Pertinent Medical History: asthma, anxiety    Precautions:  Fall Risk, O2     Assessment of current deficits    [x] Functional mobility  [x]ADLs  [x] Strength               []Cognition    [x] Functional transfers   [x] IADLs         [x] Safety Awareness   [x]Endurance    [] Fine Coordination              [x] Balance      [] Vision/perception   []Sensation     []Gross Motor Coordination  [] ROM  [] Delirium                   [] Motor Control     OT PLAN OF CARE   OT POC based on physician orders, patient diagnosis and results of clinical assessment    Frequency/Duration  1-3 days/wk for 2 weeks PRN   Specific OT Treatment Interventions to include:   ADL retraining/adapted techniques and AE recommendations to increase functional independence within precautions                    Energy conservation techniques to improve tolerance for selfcare routine   Functional transfer/mobility training/DME recommendations for increased independence, safety and fall prevention         Patient/family education to increase safety and functional independence             Environmental modifications for safe mobility and completion of ADLs Therapeutic activity to improve functional performance during ADLs. Therapeutic exercise to improve tolerance and functional strength for ADLs    Balance retraining/tolerance tasks for facilitation of postural control with dynamic challenges during ADLs . Positioning to improve functional independence      Recommended Adaptive Equipment: TBD     Home Living: Pt lives with .    1 story with 3-4 steps   Bathroom setup: walk in shower, seat , hand held shower   Equipment owned: walker , home O2    Prior Level of Function: Independent  with ADLs  SBA with showering , assist  with IADLs; ambulated with no device    Pain Level: no pain this session ;   Cognition: A&O: 4/4;    Memory:  good    Sequencing:  good    Problem solving:  good    Judgement/safety:  good      Functional Assessment:  AM-PAC Daily Activity Raw Score: 18/24   Initial Eval Status  Date: 2/9/2023 Treatment Status  Date: STGs = LTGs  Time frame: 10-14 days   Feeding Independent     Grooming Set-up   Independent   UB Dressing Set- up   Independent   LB Dressing SBA   Mod I    Bathing SBA   Mod I    Toileting Independent      Bed Mobility  Independent  Supine to sit      Functional Transfers Supervision   Sit-stand from bed   Mod I    Functional Mobility Supervision, no device, O2   Ambulating in room   No LOB or unsteadiness     On 5 L   O2 sats >90%    Patient anxious at times - needing increase time, and rest breaks with deep breathing/relax tech   Mod I  with good tolerance    Balance Sitting:     Static:  Independent     Dynamic:supervision   Standing: supervision   Independent   Activity Tolerance Limited   D/t resp status , anxiousness   Good  with ADL activity    Visual/  Perceptual Glasses: none by bedside                 Hand Dominance right   AROM (PROM) Strength Additional Info:    RUE  WFL WFl good  and wfl FMC/dexterity noted during ADL tasks       RGE WF WFL good  and wfl FMC/dexterity noted during ADL tasks       Hearing: [facility]  Sensation:  No c/o numbness or tingling   Tone: WFL   Edema: none observed     Comments: Upon arrival patient lying in bed . At end of session, patient  sitting in chair  with call light and phone within reach, all lines and tubes intact.  present    Overall patient demonstrated  decreased independence and safety during completion of ADL/functional transfer/mobility tasks. Pt would benefit from continued skilled OT to increase safety and independence with completion of ADL/IADL tasks for functional independence and quality of life. Rehab Potential: good for established goals     Patient / Family Goal: return home       Patient and/or family were instructed on functional diagnosis, prognosis/goals and OT plan of care. Demonstrated good  understanding. Eval Complexity: Low    Time In: 1015  Time Out: 1035    Min Units   OT Eval Low 97165 x  1   OT Eval Medium 26566      OT Eval High 50371      OT Re-Eval [id number]       Therapeutic Ex 08409      Therapeutic Activities 27333       ADL/Self Care 55266       Orthotic Management 03156       Manual 08794     Neuro Re-Ed 34257       Non-Billable Time          Evaluation Time additionally includes thorough review of current medical information, gathering information on past medical history/social history and prior level of function, interpretation of standardized testing/informal observation of tasks, assessment of data and development of plan of care and goals.             Lindsey Stokes  OTR/L  OT [id number]

## 2023-02-09 NOTE — PROGRESS NOTES
Date: 2/8/2023    Time: 11:05 PM    Patient Placed On BIPAP/CPAP/ Non-Invasive Ventilation? Yes    If no must comment. Facial area red/color change? No           If YES are Blister/Lesion present? No   If yes must notify nursing staff  BIPAP/CPAP skin barrier?   Yes    Skin barrier type:mepilexlite       Comments:        Gera Guajardo RCP

## 2023-02-09 NOTE — PROGRESS NOTES
Pulmonary Progress Note    Admit Date: 2023  Hospital day                               PCP: Tiffany Santiago MD    Chief Complaint (s):  Patient Active Problem List   Diagnosis    Rheumatoid arthritis ([address]    Hyperlipidemia    Asthma    Tobacco use disorder    Major depressive disorder, single episode, mild (HCC)    Severe chronic obstructive pulmonary disease (HCC)    COPD exacerbation (HCC)    Hypercapnia       Subjective:  Patient seen up in chair on 5L nasal cannula. She is tolerating activity today. She is very anxious. Educated that she may need NIV at home. Vitals:  VITALS:  BP (!) 157/94   Pulse 88   Temp 98 °F (36.7 °C) (Oral)   Resp 22   Ht 5' 2\" (1.575 m)   Wt 206 lb 8 oz (93.7 kg)   SpO2 94%   BMI 37.77 kg/m²     24HR INTAKE/OUTPUT:      Intake/Output Summary (Last 24 hours) at 2023 1142  Last data filed at 2023 1200  Gross per 24 hour   Intake --   Output 200 ml   Net -200 ml       24HR PULSE OXIMETRY RANGE:    SpO2  Av.2 %  Min: 90 %  Max: 98 %    Medications:  IV:   sodium chloride         Scheduled Meds:   methylPREDNISolone  40 mg IntraVENous Q12H    nicotine  1 patch TransDERmal Daily    escitalopram  10 mg Oral Daily    sodium chloride flush  5-40 mL IntraVENous 2 times per day    enoxaparin  40 mg SubCUTAneous Daily    famotidine (PEPCID) injection  20 mg IntraVENous BID    ipratropium-albuterol  1 ampule Inhalation Q4H WA    budesonide  0.5 mg Nebulization BID    arformoterol tartrate  15 mcg Nebulization BID    azithromycin  500 mg Oral Daily       Diet:   ADULT DIET; Regular     EXAM:  General: No distress. Alert. Eyes: PERRL. No sclera icterus. No conjunctival injection. ENT: No discharge. Pharynx clear. Neck: Trachea midline. Normal thyroid. Resp: No accessory muscle use. no rales. Scattered bilateral wheezing. no rhonchi. CV: Regular rate. Regular rhythm. No murmur or rub. Abd: Non-tender. Non-distended. No masses. No organomegaly. Normal bowel sounds. Skin: Warm and dry. No nodule on exposed extremities. No rash on exposed extremities. Ext: No cyanosis, clubbing, edema  Lymph: No cervical LAD. No supraclavicular LAD. M/S: No cyanosis. No joint deformity. No clubbing. Neuro: Awake. Follows commands. Positive pupils/gag/corneals. Normal pain response. Results:  CBC:   Recent Labs     02/07/23 0116 02/08/23 0445 02/09/23 0220   WBC 14.3* 12.1* 10.8   HGB 14.5 12.5 12.0   HCT 46.7 41.1 38.1   MCV 95.7 96.9 94.5    254 232     BMP:   Recent Labs     02/07/23 0116 02/08/23 0445 02/09/23 0220    140 142   K 4.6 4.7 4.5   CL 99 103 104   CO2 27 31* 30*   PHOS  --  2.5 2.3*   BUN 17 20 25*   CREATININE 0.7 0.7 0.8     LIVER PROFILE:   Recent Labs     02/08/23 0445 02/09/23 0220   AST 18 16   ALT 20 20   BILITOT <0.2 0.2   ALKPHOS 74 69     PT/INR: No results for input(s): PROTIME, INR in the last 72 hours. APTT: No results for input(s): APTT in the last 72 hours. Pathology:  N/A      Microbiology:  None    Recent ABG:   Recent Labs     02/09/23  0531   PH 7.414   PO2 94.7   PCO2 54.7*   HCO3 34.2*   BE 8.1*   O2SAT 97.0   METHB 0.3   O2HB 95.6   COHB 1.1   O2CON 16.8   HHB 3.0   THB 12.4     FiO2 : 35 %       Recent Films:  XR CHEST PORTABLE   Final Result   No acute cardiopulmonary process. XR CHEST PORTABLE   Final Result   No acute process. CTA PULMONARY W CONTRAST   Final Result   No evidence of pulmonary embolism or acute pulmonary abnormality. XR CHEST PORTABLE   Final Result   No acute process. XR CHEST PORTABLE    (Results Pending)       Assessment:  Acute hypercapnic respiratory failure: Likely secondary, medications and COPD. Resolving with CO2 down to 54.7. Anxiety. Plan:  Continue steroids  Continue supplemental oxygen. Keep SpO2 between 88%-92%.   Increase nicotine patch up to 21 for potential withdrawal side effects  Continue aerosols  Check ABGs, if PCO2 is greater than 45 then likely, NIV for home to reduce the risk of readmission for respiratory failure with hypercapnia and death. Await Dr. Elyssa Rajan final orders. Time at the bedside, reviewing labs and radiographs, reviewing updated notes and consultations, discussing with staff and family was more than 35 minutes. Please note that voice recognition technology was used in the preparation of this note and make therefore it may contain inadvertent transcription errors. If the patient is a COVID 19 isolation patient, the above physical exam reflects that of the examining physician for the day. CARLY Zuñiga - ADI  Attestation    The patient was seen and personally examined. Noninvasive ventilation is clearly indicated given the patient's resting PCO2. It is unclear whether she will tolerate it and use it. This will be discussed with her prior to discharge. I have discussed the case, including pertinent history and exam findings with the nurse practitioner. I have reviewed meds and pertinent labs and the key elements of the encounter have been performed by me. I agree with the assessment, plan and orders as documented by the nurse practitioner. Additions and corrections are reflected in the signed note. Georgi Cardoza MD,  M.D., F.C.C.P.    [facility], [address] [place] [address], [address] [place] [address]  Office visits:  [place]

## 2023-02-09 NOTE — PROGRESS NOTES
[facility] Progress Note    Admitting Date and Time: 2/6/2023 11:31 PM  Admit Dx: Hypercapnia [R06.89]  COPD exacerbation (HCC) [J44.1]  SIRS (systemic inflammatory response syndrome) (HCC) [R65.10]  D-dimer, elevated [R79.89]  Acute on chronic respiratory failure with hypoxia and hypercapnia (HCC) [J96.21, J96.22]  Leukocytosis, unspecified type [D72.829]    Subjective:  Patient is being followed for Hypercapnia [R06.89]  COPD exacerbation ([address] [J44.1]  SIRS (systemic inflammatory response syndrome) (HCC) [R65.10]  D-dimer, elevated [R79.89]  Acute on chronic respiratory failure with hypoxia and hypercapnia (HCC) [J96.21, J96.22]  Leukocytosis, unspecified type [D72.829]   Pt feels better. Worked with therapy yesterday and did well. Moisturizing nose drops have been helpful. Requesting for same. Per RN: Aware of above. ROS: denies fever, chills, cp, sob, n/v, HA unless stated above. nicotine  1 patch TransDERmal Daily    [START ON 2/10/2023] methylPREDNISolone  40 mg IntraVENous Daily    escitalopram  10 mg Oral Daily    sodium chloride flush  5-40 mL IntraVENous 2 times per day    enoxaparin  40 mg SubCUTAneous Daily    famotidine (PEPCID) injection  20 mg IntraVENous BID    ipratropium-albuterol  1 ampule Inhalation Q4H WA    budesonide  0.5 mg Nebulization BID    arformoterol tartrate  15 mcg Nebulization BID    azithromycin  500 mg Oral Daily     sodium chloride, 1 spray, Q2H PRN  sodium chloride flush, 5-40 mL, PRN  sodium chloride, , PRN  ondansetron, 4 mg, Q8H PRN   Or  ondansetron, 4 mg, Q6H PRN  acetaminophen, 650 mg, Q6H PRN   Or  acetaminophen, 650 mg, Q6H PRN  bisacodyl, 10 mg, Daily PRN         Objective:    BP (!) 157/94   Pulse 88   Temp 98 °F (36.7 °C) (Oral)   Resp 22   Ht 5' 2\" (1.575 m)   Wt 206 lb 8 oz (93.7 kg)   SpO2 94%   BMI 37.77 kg/m²     General Appearance: alert and oriented to person, place and time and in no acute distress.   No shortness of breath with conversation  Skin: warm and dry  Head: normocephalic and atraumatic  Eyes: pupils equal, round, and reactive to light, extraocular eye movements intact, conjunctivae normal  Neck: neck supple and non tender without mass   Pulmonary/Chest: Very minimal end expiratory wheezing normal air movement, no respiratory distress  Cardiovascular: normal rate, normal S1 and S2 and no carotid bruits  Abdomen: soft, non-tender, non-distended, normal bowel sounds, no masses or organomegaly  Extremities: no cyanosis, no clubbing and no edema  Neurologic: no cranial nerve deficit and speech normal        Recent Labs     02/07/23  0116 02/08/23 0445 02/09/23 0220    140 142   K 4.6 4.7 4.5   CL 99 103 104   CO2 27 31* 30*   BUN 17 20 25*   CREATININE 0.7 0.7 0.8   GLUCOSE 140* 115* 121*   CALCIUM 9.4 9.0 9.2       Recent Labs     02/07/23 0116 02/08/23 0445 02/09/23 0220   WBC 14.3* 12.1* 10.8   RBC 4.88 4.24 4.03   HGB 14.5 12.5 12.0   HCT 46.7 41.1 38.1   MCV 95.7 96.9 94.5   MCH 29.7 29.5 29.8   MCHC 31.0* 30.4* 31.5*   RDW 13.2 13.2 13.3    254 232   MPV 9.7 10.8 10.3       Radiology:   XR CHEST PORTABLE    Result Date: 2/9/2023  EXAMINATION: ONE XRAY VIEW OF THE CHEST 2/9/2023 7:07 am COMPARISON: Comparison studies of a January 14 through February 8. Reviewed the CT chest February 7. HISTORY: ORDERING SYSTEM PROVIDED HISTORY: resp failure TECHNOLOGIST PROVIDED HISTORY: Reason for exam:->resp failure FINDINGS: Lungs are normally expanded. Infiltrates, consolidates or pleural effusions are seen. The heart has normal size. Mediastinum appear unremarkable. There is no perihilar vascular congestion. There is no pneumothorax on the right or on the left. Emphysematous changes towards the upper lobes are seen on the recent CT chest of February 7. No acute cardiopulmonary process.      XR CHEST PORTABLE    Result Date: 2/8/2023  EXAMINATION: ONE XRAY VIEW OF THE CHEST 2/8/2023 6:04 am COMPARISON: 7 February 2023 HISTORY: ORDERING SYSTEM PROVIDED HISTORY: resp failure TECHNOLOGIST PROVIDED HISTORY: Reason for exam:->resp failure FINDINGS: The lungs are without acute focal process. There is no effusion or pneumothorax. The cardiomediastinal silhouette is without acute process. The osseous structures are without acute process. No acute process. Assessment:    Principal Problem:    Hypercapnia  Resolved Problems:    * No resolved hospital problems. *      Plan:  1. Acute Respiratory Failure with Hypercapnia - improved. Pulmonary consult and Intensivist recommendations appreciated. Needs follow up with Pulmonologist and Pulmonary rehab as outpatient. 2.   Anxiety - has been on Ativan 0.5 mg po bid and Pristiq 50 mg daily. To continue Ativan oral and resume Pristiq when able to take oral well. 3.  Tobacco Use disorder - patient was not abstinent. Continue Nicoderm patch 21 mg daily. 4.  Hyperlipidemia - on Lipitor 20 mg daily. Continue same. 5.  Obesity - BMI 34.2. Supportive care. Component of Obesity Hypoventilation syndrome. Continue respiratory support. NOTE: This report was transcribed using voice recognition software. Every effort was made to ensure accuracy; however, inadvertent computerized transcription errors may be present.     Electronically signed by Sharmila Hooper MD on 2/9/2023 at 1:54 PM

## 2023-02-09 NOTE — CARE COORDINATION
ICU tx on 2/8. Pt is from home and I prior to admission w/home O2 4L via [employer]. No hx of HHC or SNF stay w/PT/OT evals pending. Awaiting pulm plans, possible NIV? Will follow.    Ace Shell, HERMESN, RN  [facility] Case Management  [phone]

## 2023-02-09 NOTE — PROGRESS NOTES
Spoke with Dr Hakeem Ash when he rounded on unit re: bipap order for d/c. Says that we need to await ABG results to determine need for Bipap. He then attempted to get ABGs while on the unit because other nurses were unable to obtain. He was also unable to obtain ABG at that time. No further update on whether AVAPS/BiPAP order for home needed placed.

## 2023-02-09 NOTE — PROGRESS NOTES
Physical Therapy  Facility/Department: [address] INTERMEDIATE  Physical Therapy Initial Assessment    Name: Federica Prince  : 1950  MRN: [id number]  Date of Service: 2023                 Patient Diagnosis(es): The primary encounter diagnosis was Acute on chronic respiratory failure with hypoxia and hypercapnia ([facility] Diagnoses of COPD exacerbation ([address] D-dimer, elevated, SIRS (systemic inflammatory response syndrome) ([address] and Leukocytosis, unspecified type were also pertinent to this visit. Past Medical History:  has a past medical history of Asthma, Chronic renal disease, stage III ([address] [653210], Depression, Hyperlipidemia, Osteoarthritis, and Seizures ([address] Past Surgical History:  has a past surgical history that includes Hysterectomy and Tonsillectomy. Requires PT Follow-Up: Yes     Evaluating Therapist: Mayra Godwin PT     Referring Provider:  Ellis Cordova MD    PT order : PT eval and treat     Room #: 630   DIAGNOSIS: The primary encounter diagnosis was Acute on chronic respiratory failure with hypoxia and hypercapnia ([facility] Diagnoses of COPD exacerbation ([address] D-dimer, elevated, SIRS (systemic inflammatory response syndrome) ([address] and Leukocytosis, unspecified type were also pertinent to this visit. PRECAUTIONS: falls, keep SpO2 88-92% , O2     Social:  Pt lives with  spouse and daughter  in a  2  floor plan  with first floor set up, 3  steps and  1  rails to enter. Prior to admission pt walked with  no AD, independent. HAs home O2      Initial Evaluation  Date:  2023  Treatment      Short Term/ Long Term   Goals   Was pt agreeable to Eval/treatment? Yes      Does pt have pain?   Denies      Bed Mobility  Rolling:  independent   Supine to sit: independent  Sit to supine:  NT   Scooting:  independent    Independent    Transfers Sit to stand:  independent   Stand to sit:  independent   Stand pivot:  Nt    Independent    Ambulation     40  feet x 2  with  no AD  with Supervision    100  feet with  no AD/AAD  with  independent        Stair negotiation: ascended and descended Nt    4  steps with  1 rail with  independent    LE ROM  WFL     LE strength  WFL      AM- PAC RAW score   22/ 24            Pt is alert and Oriented      Balance: Supervision . Fall risk due to  anxious with mobility, decreased safety awareness , decreased balance and activity tolerance   Endurance: decreased      ASSESSMENT  Pt displays functional ability as noted in the objective portion of this evaluation. Conditions Requiring Skilled Therapeutic Intervention:    [x]Decreased strength     []Decreased ROM  [x]Decreased functional mobility  [x]Decreased balance   [x]Decreased endurance   []Decreased posture  []Decreased sensation  []Decreased coordination   []Decreased vision  [x]Decreased safety awareness   []Increased pain         Treatment/Education:    Pt  in bed  upon arrival ; agreeable to PT. Pt's daughter present. Mobility as above. Cues needed to decreased speed with mobility; energy conservation. Instructed in O2 line management safety - pt reports understanding due to having O2 at home. O2@ 2 LNC. SpO2 at rest 93%, decreased briefly to 86% with gait, but recovered quickly to 88%; fully recovered to 92%. Instructed in PLB. RN approved issuing IS. Instructed pt in use. She demonstrated proper use after instruction; 750  ml. Pt educated on fall risk, safety with mobility        Patient response to education:   Pt verbalized understanding Pt demonstrated skill Pt requires further education in this area   x  With cues   x       Comments:  Pt left  in chair after session, with call light in reach. Rehab potential is Good for reaching above PT goals. Pts/ family goals   1. Breathe better     Patient and or family understand(s) diagnosis, prognosis, and plan of care. -  yes     PLAN  PT care will be provided in accordance with the objectives noted above.   Whenever appropriate, clear delegation orders will be provided for nursing staff. Exercises and functional mobility practice will be used as well as appropriate assistive devices or modalities to obtain goals. Patient and family education will also be administered as needed. PLAN OF CARE:    Current Treatment Recommendations     [x] Strengthening to improve independence with functional mobility   [] ROM to improve independence with functional mobility   [x] Balance Training to improve static/dynamic balance and to reduce fall risk  [x] Endurance Training to improve activity tolerance during functional mobility   [x] Transfer Training to improve safety and independence with all functional transfers   [x] Gait Training to improve gait mechanics, endurance and assess need for appropriate assistive device  [x] Stair Training in preparation for safe discharge home and/or into the community   [x] Positioning to prevent skin breakdown and contractures  [x] Safety and Education Training   [x] Patient/Caregiver Education   [] HEP  [] Other     Frequency of treatments will be 2-5x/week x 7-10  days. Time in: 1417   Time out:  1440       Evaluation Time includes thorough review of current medical information, gathering information on past medical history/social history and prior level of function, completion of standardized testing/informal observation of tasks, assessment of data and education on plan of care and goals.     CPT codes:  [x] Low Complexity PT evaluation 61707  [] Moderate Complexity PT evaluation 30290  [] High Complexity PT evaluation 63463  [] PT Re-evaluation 99721  [] Gait training 92893  minutes  [x] Therapeutic activities 23736 8 minutes  [] Therapeutic exercises 56675  minutes  [] Neuromuscular reeducation 39672  minutes       Kiara Babin number:  PT 1692

## 2023-02-09 NOTE — PROGRESS NOTES
[facility] Quality Flow/Interdisciplinary Rounds Progress Note        Quality Flow Rounds held on February 9, 2023    Disciplines Attending:  Bedside Nurse, , and Nursing Unit Leadership    Zoraida Guajardo was admitted on 2/6/2023 11:31 PM    Anticipated Discharge Date:   tbd    Disposition: home    Remy Score:  Remy Scale Score: 20    Readmission Risk              Risk of Unplanned Readmission:  12           Discussed patient goal for the day, patient clinical progression, and barriers to discharge.   The following Goal(s) of the Day/Commitment(s) have been identified:   await pulm plan for discharge, need BIPAP at home?, await speaking with Dr Suma Ascencio RN  February 9, 2023

## 2023-02-10 ENCOUNTER — APPOINTMENT (OUTPATIENT)
Dept: GENERAL RADIOLOGY | Age: 73
DRG: 189 | End: 2023-02-10
Payer: MEDICARE

## 2023-02-10 LAB
ALBUMIN SERPL-MCNC: 3.6 G/DL (ref 3.5–5.2)
ALP BLD-CCNC: 62 U/L (ref 35–104)
ALT SERPL-CCNC: 20 U/L (ref 0–32)
ANION GAP SERPL CALCULATED.3IONS-SCNC: 6 MMOL/L (ref 7–16)
AST SERPL-CCNC: 18 U/L (ref 0–31)
BASOPHILS ABSOLUTE: 0.03 E9/L (ref 0–0.2)
BASOPHILS RELATIVE PERCENT: 0.3 % (ref 0–2)
BILIRUB SERPL-MCNC: 0.3 MG/DL (ref 0–1.2)
BUN BLDV-MCNC: 27 MG/DL (ref 6–23)
CALCIUM SERPL-MCNC: 9.5 MG/DL (ref 8.6–10.2)
CHLORIDE BLD-SCNC: 104 MMOL/L (ref 98–107)
CO2: 33 MMOL/L (ref 22–29)
CREAT SERPL-MCNC: 0.9 MG/DL (ref 0.5–1)
EOSINOPHILS ABSOLUTE: 0.09 E9/L (ref 0.05–0.5)
EOSINOPHILS RELATIVE PERCENT: 1 % (ref 0–6)
GFR SERPL CREATININE-BSD FRML MDRD: >60 ML/MIN/1.73
GLUCOSE BLD-MCNC: 64 MG/DL (ref 74–99)
HCT VFR BLD CALC: 38.5 % (ref 34–48)
HEMOGLOBIN: 12.2 G/DL (ref 11.5–15.5)
IMMATURE GRANULOCYTES #: 0.04 E9/L
IMMATURE GRANULOCYTES %: 0.4 % (ref 0–5)
LYMPHOCYTES ABSOLUTE: 2.2 E9/L (ref 1.5–4)
LYMPHOCYTES RELATIVE PERCENT: 24.2 % (ref 20–42)
MAGNESIUM: 2.1 MG/DL (ref 1.6–2.6)
MCH RBC QN AUTO: 29.7 PG (ref 26–35)
MCHC RBC AUTO-ENTMCNC: 31.7 % (ref 32–34.5)
MCV RBC AUTO: 93.7 FL (ref 80–99.9)
METER GLUCOSE: 79 MG/DL (ref 74–99)
MONOCYTES ABSOLUTE: 0.67 E9/L (ref 0.1–0.95)
MONOCYTES RELATIVE PERCENT: 7.4 % (ref 2–12)
NEUTROPHILS ABSOLUTE: 6.07 E9/L (ref 1.8–7.3)
NEUTROPHILS RELATIVE PERCENT: 66.7 % (ref 43–80)
PDW BLD-RTO: 13.6 FL (ref 11.5–15)
PHOSPHORUS: 2.7 MG/DL (ref 2.5–4.5)
PLATELET # BLD: 237 E9/L (ref 130–450)
PMV BLD AUTO: 10.4 FL (ref 7–12)
POTASSIUM SERPL-SCNC: 3.7 MMOL/L (ref 3.5–5)
RBC # BLD: 4.11 E12/L (ref 3.5–5.5)
SODIUM BLD-SCNC: 143 MMOL/L (ref 132–146)
TOTAL PROTEIN: 6.2 G/DL (ref 6.4–8.3)
WBC # BLD: 9.1 E9/L (ref 4.5–11.5)

## 2023-02-10 PROCEDURE — 84100 ASSAY OF PHOSPHORUS: CPT

## 2023-02-10 PROCEDURE — 2060000000 HC ICU INTERMEDIATE R&B

## 2023-02-10 PROCEDURE — 6370000000 HC RX 637 (ALT 250 FOR IP): Performed by: INTERNAL MEDICINE

## 2023-02-10 PROCEDURE — 2580000003 HC RX 258: Performed by: INTERNAL MEDICINE

## 2023-02-10 PROCEDURE — 85025 COMPLETE CBC W/AUTO DIFF WBC: CPT

## 2023-02-10 PROCEDURE — 83735 ASSAY OF MAGNESIUM: CPT

## 2023-02-10 PROCEDURE — 6360000002 HC RX W HCPCS: Performed by: INTERNAL MEDICINE

## 2023-02-10 PROCEDURE — 99233 SBSQ HOSP IP/OBS HIGH 50: CPT | Performed by: FAMILY MEDICINE

## 2023-02-10 PROCEDURE — 36415 COLL VENOUS BLD VENIPUNCTURE: CPT

## 2023-02-10 PROCEDURE — 82962 GLUCOSE BLOOD TEST: CPT

## 2023-02-10 PROCEDURE — 97110 THERAPEUTIC EXERCISES: CPT

## 2023-02-10 PROCEDURE — 97530 THERAPEUTIC ACTIVITIES: CPT

## 2023-02-10 PROCEDURE — 94660 CPAP INITIATION&MGMT: CPT

## 2023-02-10 PROCEDURE — 80053 COMPREHEN METABOLIC PANEL: CPT

## 2023-02-10 PROCEDURE — 71045 X-RAY EXAM CHEST 1 VIEW: CPT

## 2023-02-10 PROCEDURE — 2700000000 HC OXYGEN THERAPY PER DAY

## 2023-02-10 PROCEDURE — 6370000000 HC RX 637 (ALT 250 FOR IP): Performed by: FAMILY MEDICINE

## 2023-02-10 PROCEDURE — 94640 AIRWAY INHALATION TREATMENT: CPT

## 2023-02-10 RX ORDER — FAMOTIDINE 20 MG/1
20 TABLET, FILM COATED ORAL 2 TIMES DAILY
Status: DISCONTINUED | OUTPATIENT
Start: 2023-02-10 | End: 2023-02-13 | Stop reason: HOSPADM

## 2023-02-10 RX ORDER — DEXTROSE MONOHYDRATE 100 MG/ML
INJECTION, SOLUTION INTRAVENOUS CONTINUOUS PRN
Status: DISCONTINUED | OUTPATIENT
Start: 2023-02-10 | End: 2023-02-13 | Stop reason: HOSPADM

## 2023-02-10 RX ADMIN — IPRATROPIUM BROMIDE AND ALBUTEROL SULFATE 1 AMPULE: 2.5; .5 SOLUTION RESPIRATORY (INHALATION) at 12:48

## 2023-02-10 RX ADMIN — IPRATROPIUM BROMIDE AND ALBUTEROL SULFATE 1 AMPULE: 2.5; .5 SOLUTION RESPIRATORY (INHALATION) at 08:38

## 2023-02-10 RX ADMIN — METHYLPREDNISOLONE SODIUM SUCCINATE 40 MG: 40 INJECTION, POWDER, FOR SOLUTION INTRAMUSCULAR; INTRAVENOUS at 09:44

## 2023-02-10 RX ADMIN — SODIUM CHLORIDE, PRESERVATIVE FREE 10 ML: 5 INJECTION INTRAVENOUS at 09:49

## 2023-02-10 RX ADMIN — SODIUM CHLORIDE, PRESERVATIVE FREE 10 ML: 5 INJECTION INTRAVENOUS at 21:22

## 2023-02-10 RX ADMIN — IPRATROPIUM BROMIDE AND ALBUTEROL SULFATE 1 AMPULE: 2.5; .5 SOLUTION RESPIRATORY (INHALATION) at 20:33

## 2023-02-10 RX ADMIN — FAMOTIDINE 20 MG: 20 TABLET, FILM COATED ORAL at 20:53

## 2023-02-10 RX ADMIN — ARFORMOTEROL TARTRATE 15 MCG: 15 SOLUTION RESPIRATORY (INHALATION) at 20:33

## 2023-02-10 RX ADMIN — IPRATROPIUM BROMIDE AND ALBUTEROL SULFATE 1 AMPULE: 2.5; .5 SOLUTION RESPIRATORY (INHALATION) at 16:05

## 2023-02-10 RX ADMIN — ESCITALOPRAM 10 MG: 10 TABLET, FILM COATED ORAL at 09:42

## 2023-02-10 RX ADMIN — BUDESONIDE 500 MCG: 0.5 SUSPENSION RESPIRATORY (INHALATION) at 08:38

## 2023-02-10 RX ADMIN — ENOXAPARIN SODIUM 40 MG: 100 INJECTION SUBCUTANEOUS at 09:44

## 2023-02-10 RX ADMIN — BUDESONIDE 500 MCG: 0.5 SUSPENSION RESPIRATORY (INHALATION) at 20:33

## 2023-02-10 RX ADMIN — FAMOTIDINE 20 MG: 20 TABLET, FILM COATED ORAL at 09:42

## 2023-02-10 RX ADMIN — ARFORMOTEROL TARTRATE 15 MCG: 15 SOLUTION RESPIRATORY (INHALATION) at 08:38

## 2023-02-10 ASSESSMENT — PAIN SCALES - GENERAL: PAINLEVEL_OUTOF10: 0

## 2023-02-10 NOTE — PROGRESS NOTES
P Quality Flow/Interdisciplinary Rounds Progress Note        Quality Flow Rounds held on February 10, 2023    Disciplines Attending:  Bedside Nurse, , , and Nursing Unit 1538 Chasity Diaz was admitted on 2/6/2023 11:31 PM    Anticipated Discharge Date:   tbd    Disposition: home    Remy Score:  Remy Scale Score: 20    Readmission Risk              Risk of Unplanned Readmission:  13           Discussed patient goal for the day, patient clinical progression, and barriers to discharge.   The following Goal(s) of the Day/Commitment(s) have been identified:   await pulm plan re bipap/avaps at home       Ambrocio Corona, [facility]  February 10, 2023

## 2023-02-10 NOTE — PROGRESS NOTES
[address] TREATMENT NOTE      Date:2/10/2023  Patient Name: Katelyn Nichols  MRN: [id number]  : 1950  Room: [address] [place], [place] [address]       Evaluating OT: Nathalie Barbosa OTR/L   [id number]       Referring Emma Adam MD    Specific Provider Orders/Date:OT eval and treat 2023       Diagnosis:  Hypercapnia [R06.89]  COPD exacerbation ([facility] [J44.1]  SIRS (systemic inflammatory response syndrome) ([facility] [R65.10]  D-dimer, elevated [R79.89]  Acute on chronic respiratory failure with hypoxia and hypercapnia ([facility] [J96.21, J96.22]  Leukocytosis, unspecified type [D72.829]     Pertinent Medical History: asthma, anxiety    Precautions:  Fall Risk, O2      Assessment of current deficits    [x] Functional mobility            [x]ADLs           [x] Strength                  []Cognition    [x] Functional transfers          [x] IADLs         [x] Safety Awareness   [x]Endurance    [] Fine Coordination                         [x] Balance      [] Vision/perception   []Sensation      []Gross Motor Coordination             [] ROM           [] Delirium                   [] Motor Control      OT PLAN OF CARE   OT POC based on physician orders, patient diagnosis and results of clinical assessment     Frequency/Duration  1-3 days/wk for 2 weeks PRN   Specific OT Treatment Interventions to include:   ADL retraining/adapted techniques and AE recommendations to increase functional independence within precautions                    Energy conservation techniques to improve tolerance for selfcare routine   Functional transfer/mobility training/DME recommendations for increased independence, safety and fall prevention         Patient/family education to increase safety and functional independence             Environmental modifications for safe mobility and completion of ADLs                             Therapeutic activity to improve functional performance during ADLs. Therapeutic exercise to improve tolerance and functional strength for ADLs    Balance retraining/tolerance tasks for facilitation of postural control with dynamic challenges during ADLs . Positioning to improve functional independence        Recommended Adaptive Equipment: TBD      Home Living: Pt lives with . 1 story with 3-4 steps   Bathroom setup: walk in shower, seat , hand held shower   Equipment owned: walker , home O2     Prior Level of Function: Independent  with ADLs  SBA with showering , assist  with IADLs; ambulated with no device     Pain Level: no pain this session ;   Cognition: A&O: 4/4;               Memory:  good               Sequencing:  good               Problem solving:  good               Judgement/safety:  good                 Functional Assessment:  AM-PAC Daily Activity Raw Score: 18/24    Initial Eval Status  Date: 2/9/2023 Treatment Status  Date: 2/10/23 STGs = LTGs  Time frame: 10-14 days   Feeding Independent       Grooming Set-up    Independent   UB Dressing Set- up  Supervision  Independent   LB Dressing SBA   supervision Mod I    Bathing SBA    Mod I    Toileting Independent        Bed Mobility  Independent  Supine to sit  independent      Functional Transfers Supervision   Sit-stand from bed  supervision  Mod I    Functional Mobility Supervision, no device, O2   Ambulating in room   No LOB or unsteadiness      On 5 L   O2 sats >90%     Patient anxious at times - needing increase time, and rest breaks with deep breathing/relax tech  Supervision without device, able to manage O2 line  Mod I  with good tolerance    Balance Sitting:     Static:  Independent     Dynamic:supervision   Standing: supervision  Standing supervision  Independent   Activity Tolerance Limited   D/t resp status , anxiousness  Fair- d/t resp status however improvement demonstrated with planned rest breaks.   Good  with ADL activity      Comments:  patient cleared with nursing and agreeable to session. Spouse present. Patient quick to fatigue but improved tolerance demonstrated after instruction on breathing technique, planned rest breaks, energy conservation, and work simplification techniques. Patient able to recover with PLB with less anxiety noted. O2 sat 92-95 on 3L. Patient sitting EOB with call light in reach at the end of the session. Exercise: BUE/chest expansion exercises performed after instruction with focus on deep breathing. Good carry over of instruction demonstrated and patient was encouraged to continue throughout the day to improve endurance for self care tasks. Education/treatment: ADL and functional transfer/activity performed to increase safety and independence during self care tasks. Education provided on safety awareness, breathing technique, energy conservation, work simplification, functional transfer training, adl reeducation    Pt has made progress towards set goals.      Time In: 2:30  Time Out: 3:08     Min Units   Therapeutic Ex 79443 10 1   Therapeutic Activities 79499 02 2   ADL/Self Care 17824     Orthotic Management 26648     Neuro Re-Ed 86824     Non-Billable Time     TOTAL TIMED TREATMENT [address] [place] BLOCK/KISHA [address]

## 2023-02-10 NOTE — PROGRESS NOTES
Pulmonary Progress Note    Admit Date: 2023  Hospital day                               PCP: Salvador Damon MD    Chief Complaint (s):  Patient Active Problem List   Diagnosis    Rheumatoid arthritis ([address]    Hyperlipidemia    Asthma    Tobacco use disorder    Major depressive disorder, single episode, mild (HCC)    Severe chronic obstructive pulmonary disease (HCC)    COPD exacerbation (HCC)    Hypercapnia       Subjective:  Patient seen in bed on 2 L. She is anxious to go home. Discussed NIV for home at length. Patient understands that she will need to wear it for at least 4-5 hours to have any benefit. She asked if daytime napping with NIV was appropriate. Overall, she understands that longer lengths of time with sleep especially will help her hypercapnia. Vitals:  VITALS:  BP (!) 160/72   Pulse 83   Temp 98.2 °F (36.8 °C) (Oral)   Resp 20   Ht 5' 2\" (1.575 m)   Wt 208 lb 6.4 oz (94.5 kg)   SpO2 91%   BMI 38.12 kg/m²     24HR INTAKE/OUTPUT:    No intake or output data in the 24 hours ending 02/10/23 1137      24HR PULSE OXIMETRY RANGE:    SpO2  Av.8 %  Min: 91 %  Max: 100 %    Medications:  IV:   dextrose      sodium chloride         Scheduled Meds:   famotidine  20 mg Oral BID    nicotine  1 patch TransDERmal Daily    methylPREDNISolone  40 mg IntraVENous Daily    escitalopram  10 mg Oral Daily    sodium chloride flush  5-40 mL IntraVENous 2 times per day    enoxaparin  40 mg SubCUTAneous Daily    ipratropium-albuterol  1 ampule Inhalation Q4H WA    budesonide  0.5 mg Nebulization BID    arformoterol tartrate  15 mcg Nebulization BID       Diet:   ADULT DIET; Regular     EXAM:  General: No distress. Alert. Eyes: PERRL. No sclera icterus. No conjunctival injection. ENT: No discharge. Pharynx clear. Neck: Trachea midline. Normal thyroid. Resp: No accessory muscle use. no rales. Scattered bilateral wheezing. no rhonchi. CV: Regular rate. Regular rhythm. No murmur or rub. Abd: Non-tender. Non-distended. No masses. No organomegaly. Normal bowel sounds. Skin: Warm and dry. No nodule on exposed extremities. No rash on exposed extremities. Ext: No cyanosis, clubbing, edema  Lymph: No cervical LAD. No supraclavicular LAD. M/S: No cyanosis. No joint deformity. No clubbing. Neuro: Awake. Follows commands. Positive pupils/gag/corneals. Normal pain response. Results:  CBC:   Recent Labs     02/08/23  0445 02/09/23  0220 02/10/23  0400   WBC 12.1* 10.8 9.1   HGB 12.5 12.0 12.2   HCT 41.1 38.1 38.5   MCV 96.9 94.5 93.7    232 237       BMP:   Recent Labs     02/08/23  0445 02/09/23  0220 02/10/23  0400    142 143   K 4.7 4.5 3.7    104 104   CO2 31* 30* 33*   PHOS 2.5 2.3* 2.7   BUN 20 25* 27*   CREATININE 0.7 0.8 0.9       LIVER PROFILE:   Recent Labs     02/08/23  0445 02/09/23  0220 02/10/23  0400   AST 18 16 18   ALT 20 20 20   BILITOT <0.2 0.2 0.3   ALKPHOS 74 69 62       PT/INR: No results for input(s): PROTIME, INR in the last 72 hours. APTT: No results for input(s): APTT in the last 72 hours. Pathology:  N/A      Microbiology:  None    Recent ABG:   Recent Labs     02/09/23  0531   PH 7.414   PO2 94.7   PCO2 54.7*   HCO3 34.2*   BE 8.1*   O2SAT 97.0   METHB 0.3   O2HB 95.6   COHB 1.1   O2CON 16.8   HHB 3.0   THB 12.4       FiO2 : 35 %       Recent Films:  XR CHEST PORTABLE   Final Result   Normal chest         XR CHEST PORTABLE   Final Result   No acute cardiopulmonary process. XR CHEST PORTABLE   Final Result   No acute process. CTA PULMONARY W CONTRAST   Final Result   No evidence of pulmonary embolism or acute pulmonary abnormality. XR CHEST PORTABLE   Final Result   No acute process. Assessment:  Chronic hypercapnic respiratory failure: Secondary to end-stage chronic obstructive pulmonary disease. Anxiety. Plan:  Continue steroids. Continue supplemental oxygen. Keep SpO2 between 88%-92%.   Nicotine patch up to 21 for potential withdrawal side effects  Continue aerosols  DME for home NIV to reduce the risk of readmission for respiratory failure with hypercapnia and death. Time at the bedside, reviewing labs and radiographs, reviewing updated notes and consultations, discussing with staff and family was more than 35 minutes. Please note that voice recognition technology was used in the preparation of this note and make therefore it may contain inadvertent transcription errors. If the patient is a COVID 19 isolation patient, the above physical exam reflects that of the examining physician for the day. CARLY Goyal - ADI   Attestation    The patient was seen and personally examined. At the bedside this p.m. Above findings were Reviewedwith them. I have discussed the case, including pertinent history and exam findings with the nurse practitioner. I have reviewed meds and pertinent labs and the key elements of the encounter have been performed by me. I agree with the assessment, plan and orders as documented by the nurse practitioner. Additions and corrections are reflected in the signed note. Rodriguez Sauceda MD,  M.D., F.C.C.P.    [facility], [address] [place] [address], [address] [place] [address], [facility], [facility]  Office visits:  [place], [place], [facility]

## 2023-02-10 NOTE — PROGRESS NOTES
Notification of IV TO PO conversion  This patient's order for PEPCID IV hs been changed to PEPCID oral as approved by the pharmacy & therapeutics and Medical Executive Committees

## 2023-02-10 NOTE — CARE COORDINATION
NIV ordered, David working on authorization w/family updated. Raul can be reached at [phone]. Will follow.  JANAY Garcia, RN  [facility]  [phone]      Family requesting to speak directly w/Raul w/number provided. Pt does not want to wait for authorization and wants to discharge over the weekend. Family now requesting HHC w/referral made to [facility] as requested, demographic sheet faxed, [phone], awaiting acceptance.   JANAY Garcia, RN  [facility]  [phone]    The Plan for Transition of Care is related to the following treatment goals: HHC    The Patient was provided with a choice of provider and agrees   with the discharge plan. [x] Yes [] No    Freedom of choice list was provided with basic dialogue that supports the patient's individualized plan of care/goals, treatment preferences and shares the quality data associated with the providers. [x] Yes [] No     After speaking w/Raul w/[place] pt is agreeable to staying over the weekend. C orders in place.   JANAY Garcia, RN  [facility]  [phone]

## 2023-02-10 NOTE — PROGRESS NOTES
[facility] Progress Note    Admitting Date and Time: 2/6/2023 11:31 PM  Admit Dx: Hypercapnia [R06.89]  COPD exacerbation (HCC) [J44.1]  SIRS (systemic inflammatory response syndrome) (HCC) [R65.10]  D-dimer, elevated [R79.89]  Acute on chronic respiratory failure with hypoxia and hypercapnia (HCC) [J96.21, J96.22]  Leukocytosis, unspecified type [D72.829]    Subjective:  Patient is being followed for Hypercapnia [R06.89]  COPD exacerbation ([address] [J44.1]  SIRS (systemic inflammatory response syndrome) (HCC) [R65.10]  D-dimer, elevated [R79.89]  Acute on chronic respiratory failure with hypoxia and hypercapnia (HCC) [J96.21, J96.22]  Leukocytosis, unspecified type [D72.829]   Pt feels better wants to go home. Order for an IV initiated.  to give an update. Per RN: No new concerns. ROS: denies fever, chills, cp, sob, n/v, HA unless stated above.       famotidine  20 mg Oral BID    nicotine  1 patch TransDERmal Daily    methylPREDNISolone  40 mg IntraVENous Daily    escitalopram  10 mg Oral Daily    sodium chloride flush  5-40 mL IntraVENous 2 times per day    enoxaparin  40 mg SubCUTAneous Daily    ipratropium-albuterol  1 ampule Inhalation Q4H WA    budesonide  0.5 mg Nebulization BID    arformoterol tartrate  15 mcg Nebulization BID     glucose, 4 tablet, PRN  dextrose bolus, 125 mL, PRN   Or  dextrose bolus, 250 mL, PRN  glucagon (rDNA), 1 mg, PRN  dextrose, , Continuous PRN  sodium chloride, 1 spray, Q2H PRN  sodium chloride flush, 5-40 mL, PRN  sodium chloride, , PRN  ondansetron, 4 mg, Q8H PRN   Or  ondansetron, 4 mg, Q6H PRN  acetaminophen, 650 mg, Q6H PRN   Or  acetaminophen, 650 mg, Q6H PRN  bisacodyl, 10 mg, Daily PRN         Objective:    BP (!) 164/84   Pulse 87   Temp 98.6 °F (37 °C) (Oral)   Resp 18   Ht 5' 2\" (1.575 m)   Wt 208 lb 6.4 oz (94.5 kg)   SpO2 91%   BMI 38.12 kg/m²     General Appearance: alert and oriented to person, place and time and in no acute distress  Skin: warm and dry  Head: normocephalic and atraumatic  Eyes: pupils equal, round, and reactive to light, extraocular eye movements intact, conjunctivae normal  Neck: neck supple and non tender without mass   Pulmonary/Chest: clear to auscultation bilaterally- no wheezes, rales or rhonchi, normal air movement, no respiratory distress  Cardiovascular: normal rate, normal S1 and S2 and no carotid bruits  Abdomen: soft, non-tender, non-distended, normal bowel sounds, no masses or organomegaly  Extremities: no cyanosis, no clubbing and no edema  Neurologic: no cranial nerve deficit and speech normal        Recent Labs     02/08/23  0445 02/09/23  0220 02/10/23  0400    142 143   K 4.7 4.5 3.7    104 104   CO2 31* 30* 33*   BUN 20 25* 27*   CREATININE 0.7 0.8 0.9   GLUCOSE 115* 121* 64*   CALCIUM 9.0 9.2 9.5       Recent Labs     02/08/23  0445 02/09/23  0220 02/10/23  0400   WBC 12.1* 10.8 9.1   RBC 4.24 4.03 4.11   HGB 12.5 12.0 12.2   HCT 41.1 38.1 38.5   MCV 96.9 94.5 93.7   MCH 29.5 29.8 29.7   MCHC 30.4* 31.5* 31.7*   RDW 13.2 13.3 13.6    232 237   MPV 10.8 10.3 10.4       Radiology:   XR CHEST PORTABLE    Result Date: 2/10/2023  EXAMINATION: ONE XRAY VIEW OF THE CHEST 2/10/2023 6:52 am COMPARISON: 02/09/2023 HISTORY: ORDERING SYSTEM PROVIDED HISTORY: resp failure TECHNOLOGIST PROVIDED HISTORY: Reason for exam:->resp failure FINDINGS: Heart size is normal.  There are no infiltrates or effusions. Normal chest     XR CHEST PORTABLE    Result Date: 2/9/2023  EXAMINATION: ONE XRAY VIEW OF THE CHEST 2/9/2023 7:07 am COMPARISON: Comparison studies of a January 14 through February 8. Reviewed the CT chest February 7. HISTORY: ORDERING SYSTEM PROVIDED HISTORY: resp failure TECHNOLOGIST PROVIDED HISTORY: Reason for exam:->resp failure FINDINGS: Lungs are normally expanded. Infiltrates, consolidates or pleural effusions are seen. The heart has normal size.   Mediastinum appear unremarkable. There is no perihilar vascular congestion. There is no pneumothorax on the right or on the left. Emphysematous changes towards the upper lobes are seen on the recent CT chest of February 7. No acute cardiopulmonary process. Assessment:    Principal Problem:    Hypercapnia  Resolved Problems:    * No resolved hospital problems. *      Plan:  1. Acute Respiratory Failure with Hypercapnia - improved. Pulmonary consult and Intensivist recommendations appreciated. Needs follow up with Pulmonologist and Pulmonary rehab as outpatient.  working on order for NIV for home use. 2.   Anxiety - has been on Ativan 0.5 mg po bid and Pristiq 50 mg daily. To continue Ativan oral and resume Pristiq when able to take oral well. 3.  Tobacco Use disorder - patient was not abstinent. Continue Nicoderm patch 21 mg daily. 4.  Hyperlipidemia - on Lipitor 20 mg daily. Continue same. 5.  Obesity - BMI 34.2. Supportive care. Component of Obesity Hypoventilation syndrome. Continue respiratory support. NOTE: This report was transcribed using voice recognition software. Every effort was made to ensure accuracy; however, inadvertent computerized transcription errors may be present.     Electronically signed by Jean Pierre Mello MD on 2/10/2023 at 6:13 PM

## 2023-02-10 NOTE — PROGRESS NOTES
Pt expresses extreme anxiety to having ABGs completed.  Would like to speak to Dr Mic Michael prior to having ABGs drawn

## 2023-02-11 LAB
ANION GAP SERPL CALCULATED.3IONS-SCNC: 7 MMOL/L (ref 7–16)
BASOPHILS ABSOLUTE: 0.05 E9/L (ref 0–0.2)
BASOPHILS RELATIVE PERCENT: 0.5 % (ref 0–2)
BUN BLDV-MCNC: 23 MG/DL (ref 6–23)
C-REACTIVE PROTEIN: <0.3 MG/DL (ref 0–0.4)
CALCIUM SERPL-MCNC: 9.7 MG/DL (ref 8.6–10.2)
CHLORIDE BLD-SCNC: 101 MMOL/L (ref 98–107)
CO2: 33 MMOL/L (ref 22–29)
CREAT SERPL-MCNC: 1 MG/DL (ref 0.5–1)
EOSINOPHILS ABSOLUTE: 0.23 E9/L (ref 0.05–0.5)
EOSINOPHILS RELATIVE PERCENT: 2.2 % (ref 0–6)
GFR SERPL CREATININE-BSD FRML MDRD: 60 ML/MIN/1.73
GLUCOSE BLD-MCNC: 95 MG/DL (ref 74–99)
HCT VFR BLD CALC: 41.4 % (ref 34–48)
HEMOGLOBIN: 13.4 G/DL (ref 11.5–15.5)
IMMATURE GRANULOCYTES #: 0.05 E9/L
IMMATURE GRANULOCYTES %: 0.5 % (ref 0–5)
LYMPHOCYTES ABSOLUTE: 2.53 E9/L (ref 1.5–4)
LYMPHOCYTES RELATIVE PERCENT: 23.9 % (ref 20–42)
MCH RBC QN AUTO: 30 PG (ref 26–35)
MCHC RBC AUTO-ENTMCNC: 32.4 % (ref 32–34.5)
MCV RBC AUTO: 92.6 FL (ref 80–99.9)
MONOCYTES ABSOLUTE: 0.85 E9/L (ref 0.1–0.95)
MONOCYTES RELATIVE PERCENT: 8 % (ref 2–12)
NEUTROPHILS ABSOLUTE: 6.89 E9/L (ref 1.8–7.3)
NEUTROPHILS RELATIVE PERCENT: 64.9 % (ref 43–80)
PDW BLD-RTO: 13.6 FL (ref 11.5–15)
PLATELET # BLD: 246 E9/L (ref 130–450)
PMV BLD AUTO: 9.9 FL (ref 7–12)
POTASSIUM REFLEX MAGNESIUM: 3.8 MMOL/L (ref 3.5–5)
RBC # BLD: 4.47 E12/L (ref 3.5–5.5)
SODIUM BLD-SCNC: 141 MMOL/L (ref 132–146)
WBC # BLD: 10.6 E9/L (ref 4.5–11.5)

## 2023-02-11 PROCEDURE — 2060000000 HC ICU INTERMEDIATE R&B

## 2023-02-11 PROCEDURE — 94660 CPAP INITIATION&MGMT: CPT

## 2023-02-11 PROCEDURE — 6360000002 HC RX W HCPCS

## 2023-02-11 PROCEDURE — 6370000000 HC RX 637 (ALT 250 FOR IP): Performed by: INTERNAL MEDICINE

## 2023-02-11 PROCEDURE — 6360000002 HC RX W HCPCS: Performed by: INTERNAL MEDICINE

## 2023-02-11 PROCEDURE — 6370000000 HC RX 637 (ALT 250 FOR IP): Performed by: FAMILY MEDICINE

## 2023-02-11 PROCEDURE — 2580000003 HC RX 258: Performed by: INTERNAL MEDICINE

## 2023-02-11 PROCEDURE — 36415 COLL VENOUS BLD VENIPUNCTURE: CPT

## 2023-02-11 PROCEDURE — 85025 COMPLETE CBC W/AUTO DIFF WBC: CPT

## 2023-02-11 PROCEDURE — 80048 BASIC METABOLIC PNL TOTAL CA: CPT

## 2023-02-11 PROCEDURE — 99233 SBSQ HOSP IP/OBS HIGH 50: CPT | Performed by: FAMILY MEDICINE

## 2023-02-11 PROCEDURE — 86140 C-REACTIVE PROTEIN: CPT

## 2023-02-11 PROCEDURE — 94640 AIRWAY INHALATION TREATMENT: CPT

## 2023-02-11 PROCEDURE — 2700000000 HC OXYGEN THERAPY PER DAY

## 2023-02-11 RX ORDER — HYDRALAZINE HYDROCHLORIDE 20 MG/ML
5 INJECTION INTRAMUSCULAR; INTRAVENOUS EVERY 6 HOURS PRN
Status: DISCONTINUED | OUTPATIENT
Start: 2023-02-11 | End: 2023-02-13 | Stop reason: HOSPADM

## 2023-02-11 RX ADMIN — SODIUM CHLORIDE, PRESERVATIVE FREE 10 ML: 5 INJECTION INTRAVENOUS at 20:46

## 2023-02-11 RX ADMIN — FAMOTIDINE 20 MG: 20 TABLET, FILM COATED ORAL at 20:45

## 2023-02-11 RX ADMIN — FAMOTIDINE 20 MG: 20 TABLET, FILM COATED ORAL at 09:30

## 2023-02-11 RX ADMIN — ARFORMOTEROL TARTRATE 15 MCG: 15 SOLUTION RESPIRATORY (INHALATION) at 19:36

## 2023-02-11 RX ADMIN — IPRATROPIUM BROMIDE AND ALBUTEROL SULFATE 1 AMPULE: 2.5; .5 SOLUTION RESPIRATORY (INHALATION) at 11:59

## 2023-02-11 RX ADMIN — ENOXAPARIN SODIUM 40 MG: 100 INJECTION SUBCUTANEOUS at 09:39

## 2023-02-11 RX ADMIN — ESCITALOPRAM 10 MG: 10 TABLET, FILM COATED ORAL at 09:30

## 2023-02-11 RX ADMIN — SODIUM CHLORIDE, PRESERVATIVE FREE 10 ML: 5 INJECTION INTRAVENOUS at 09:33

## 2023-02-11 RX ADMIN — SODIUM CHLORIDE, PRESERVATIVE FREE 10 ML: 5 INJECTION INTRAVENOUS at 21:15

## 2023-02-11 RX ADMIN — HYDRALAZINE HYDROCHLORIDE 5 MG: 20 INJECTION INTRAMUSCULAR; INTRAVENOUS at 21:14

## 2023-02-11 RX ADMIN — METHYLPREDNISOLONE SODIUM SUCCINATE 40 MG: 40 INJECTION, POWDER, FOR SOLUTION INTRAMUSCULAR; INTRAVENOUS at 09:32

## 2023-02-11 RX ADMIN — ACETAMINOPHEN 650 MG: 325 TABLET ORAL at 06:42

## 2023-02-11 RX ADMIN — BUDESONIDE 500 MCG: 0.5 SUSPENSION RESPIRATORY (INHALATION) at 19:36

## 2023-02-11 RX ADMIN — BUDESONIDE 500 MCG: 0.5 SUSPENSION RESPIRATORY (INHALATION) at 08:53

## 2023-02-11 RX ADMIN — IPRATROPIUM BROMIDE AND ALBUTEROL SULFATE 1 AMPULE: 2.5; .5 SOLUTION RESPIRATORY (INHALATION) at 08:53

## 2023-02-11 RX ADMIN — ARFORMOTEROL TARTRATE 15 MCG: 15 SOLUTION RESPIRATORY (INHALATION) at 08:53

## 2023-02-11 RX ADMIN — IPRATROPIUM BROMIDE AND ALBUTEROL SULFATE 1 AMPULE: 2.5; .5 SOLUTION RESPIRATORY (INHALATION) at 19:36

## 2023-02-11 RX ADMIN — IPRATROPIUM BROMIDE AND ALBUTEROL SULFATE 1 AMPULE: 2.5; .5 SOLUTION RESPIRATORY (INHALATION) at 16:16

## 2023-02-11 ASSESSMENT — PAIN DESCRIPTION - DESCRIPTORS: DESCRIPTORS: ACHING

## 2023-02-11 ASSESSMENT — PAIN SCALES - GENERAL
PAINLEVEL_OUTOF10: 3
PAINLEVEL_OUTOF10: 0
PAINLEVEL_OUTOF10: 0

## 2023-02-11 ASSESSMENT — PAIN DESCRIPTION - LOCATION: LOCATION: HEAD

## 2023-02-11 NOTE — PROGRESS NOTES
[facility]  [address], [address]  [facility], [facility]      Pulmonary Progress Note      SUBJECTIVE:  [facility], used NIV  VP=515 Fio2=35%, hasn't been able to get up much, waiting for NIV to come through for out-pt use (?) Monday.     OBJECTIVE    Medications    Continuous Infusions:   dextrose      sodium chloride         Scheduled Meds:   famotidine  20 mg Oral BID    nicotine  1 patch TransDERmal Daily    methylPREDNISolone  40 mg IntraVENous Daily    escitalopram  10 mg Oral Daily    sodium chloride flush  5-40 mL IntraVENous 2 times per day    enoxaparin  40 mg SubCUTAneous Daily    ipratropium-albuterol  1 ampule Inhalation Q4H WA    budesonide  0.5 mg Nebulization BID    arformoterol tartrate  15 mcg Nebulization BID       PRN Meds:glucose, dextrose bolus **OR** dextrose bolus, glucagon (rDNA), dextrose, sodium chloride, sodium chloride flush, sodium chloride, ondansetron **OR** ondansetron, acetaminophen **OR** acetaminophen, bisacodyl    Physical    VITALS:  BP (!) 144/66   Pulse 81   Temp 98.6 °F (37 °C) (Oral)   Resp 16   Ht 5' 2\" (1.575 m)   Wt 208 lb 3.2 oz (94.4 kg)   SpO2 93%   BMI 38.08 kg/m²     24HR INTAKE/OUTPUT:    No intake or output data in the 24 hours ending 23 1549    24HR PULSE OXIMETRY RANGE:    SpO2  Av %  Min: 91 %  Max: 95 %    General appearance: alert, appears stated age, and cooperative, obese  Lungs: rhonchi bibasilar  Heart: regular rate and rhythm, S1, S2 normal, no murmur, click, rub or gallop  Abdomen: soft, non-tender; bowel sounds normal; no masses,  no organomegaly  Extremities: extremities normal, atraumatic, no cyanosis or edema  Neurologic: Mental status: Alert, oriented, thought content appropriate    Data    CBC:   Recent Labs     23  0220 02/10/23  0400 23  0945   WBC 10.8 9.1 10.6   HGB 12.0 12.2 13.4   HCT 38.1 38.5 41.4   MCV 94.5 93.7 92.6  237 246       BMP:  Recent Labs     02/09/23  0220 02/10/23  0400 02/11/23  0945    143 141   K 4.5 3.7 3.8    104 101   CO2 30* 33* 33*   PHOS 2.3* 2.7  --    BUN 25* 27* 23   CREATININE 0.8 0.9 1.0    ALB:3,BILIDIR:3,BILITOT:3,ALKPHOS:3)@    PT/INR: No results for input(s): PROTIME, INR in the last 72 hours. ABG:   Recent Labs     02/09/23  0531   PH 7.414   PO2 94.7   PCO2 54.7*   HCO3 34.2*   BE 8.1*   O2SAT 97.0   METHB 0.3   O2HB 95.6   COHB 1.1   O2CON 16.8   HHB 3.0   THB 12.4     FiO2 : 35 %       Radiology/Other tests reviewed: none    Assessment:     Principal Problem:    Hypercapnia  Resolved Problems:    * No resolved hospital problems. *  COPD  Hypoxia      Plan:       Cont with oxygen, taper as tolerated  Cont with steroids, taper as tolerated  Cont with NIV use at night, will try to set up for home use prior to discharge  Cont with nebs, observe respiratory function  OOB to chair, ambulate with assistance      Time at the bedside, reviewing labs and radiographs, reviewing notes and consultations, discussing with staff and family was more than 35 minutes. Thanks for letting us see this patient in consultation. Please contact us with any questions. Office [phone] or after hours through [employer], x [phone].

## 2023-02-11 NOTE — PLAN OF CARE
Problem: Discharge Planning  Goal: Discharge to home or other facility with appropriate resources  Outcome: Progressing  Flowsheets (Taken 2/10/2023 0935)  Discharge to home or other facility with appropriate resources: Identify barriers to discharge with patient and caregiver     Problem: Skin/Tissue Integrity  Goal: Absence of new skin breakdown  Description: 1. Monitor for areas of redness and/or skin breakdown  2. Assess vascular access sites hourly  3. Every 4-6 hours minimum:  Change oxygen saturation probe site  4. Every 4-6 hours:  If on nasal continuous positive airway pressure, respiratory therapy assess nares and determine need for appliance change or resting period.   Outcome: Progressing     Problem: ABCDS Injury Assessment  Goal: Absence of physical injury  Outcome: Progressing  Flowsheets (Taken 2/10/2023 0935)  Absence of Physical Injury: Implement safety measures based on patient assessment     Problem: Chronic Conditions and Co-morbidities  Goal: Patient's chronic conditions and co-morbidity symptoms are monitored and maintained or improved  Outcome: Progressing     Problem: Safety - Adult  Goal: Free from fall injury  Outcome: Progressing  Flowsheets (Taken 2/10/2023 0935)  Free From Fall Injury: Instruct family/caregiver on patient safety     Problem: Pain  Goal: Verbalizes/displays adequate comfort level or baseline comfort level  Outcome: Progressing

## 2023-02-11 NOTE — PROGRESS NOTES
[facility] Progress Note    Admitting Date and Time: 2/6/2023 11:31 PM  Admit Dx: Hypercapnia [R06.89]  COPD exacerbation (HCC) [J44.1]  SIRS (systemic inflammatory response syndrome) (HCC) [R65.10]  D-dimer, elevated [R79.89]  Acute on chronic respiratory failure with hypoxia and hypercapnia (HCC) [J96.21, J96.22]  Leukocytosis, unspecified type [D72.829]    Subjective:  Patient is being followed for Hypercapnia [R06.89]  COPD exacerbation ([address] [J44.1]  SIRS (systemic inflammatory response syndrome) (HCC) [R65.10]  D-dimer, elevated [R79.89]  Acute on chronic respiratory failure with hypoxia and hypercapnia (HCC) [J96.21, J96.22]  Leukocytosis, unspecified type [D72.829]   Pt feels good. Wants to go home. Waiting for NIV approval.  Is motivated to use the NIV. Per RN: as above. ROS: denies fever, chills, cp, sob, n/v, HA unless stated above.       famotidine  20 mg Oral BID    nicotine  1 patch TransDERmal Daily    methylPREDNISolone  40 mg IntraVENous Daily    escitalopram  10 mg Oral Daily    sodium chloride flush  5-40 mL IntraVENous 2 times per day    enoxaparin  40 mg SubCUTAneous Daily    ipratropium-albuterol  1 ampule Inhalation Q4H WA    budesonide  0.5 mg Nebulization BID    arformoterol tartrate  15 mcg Nebulization BID     glucose, 4 tablet, PRN  dextrose bolus, 125 mL, PRN   Or  dextrose bolus, 250 mL, PRN  glucagon (rDNA), 1 mg, PRN  dextrose, , Continuous PRN  sodium chloride, 1 spray, Q2H PRN  sodium chloride flush, 5-40 mL, PRN  sodium chloride, , PRN  ondansetron, 4 mg, Q8H PRN   Or  ondansetron, 4 mg, Q6H PRN  acetaminophen, 650 mg, Q6H PRN   Or  acetaminophen, 650 mg, Q6H PRN  bisacodyl, 10 mg, Daily PRN         Objective:    BP (!) 170/82   Pulse 73   Temp 97.7 °F (36.5 °C) (Oral)   Resp 18   Ht 5' 2\" (1.575 m)   Wt 208 lb 3.2 oz (94.4 kg)   SpO2 91%   BMI 38.08 kg/m²     General Appearance: alert and oriented to person, place and time and in no acute distress  Skin: warm and dry  Head: normocephalic and atraumatic  Eyes: pupils equal, round, and reactive to light, extraocular eye movements intact, conjunctivae normal  Neck: neck supple and non tender without mass   Pulmonary/Chest: mild end expiratory wheezes bilaterally, , normal air movement, no respiratory distress  Cardiovascular: normal rate, normal S1 and S2 and no carotid bruits  Abdomen: soft, non-tender, non-distended, normal bowel sounds, no masses or organomegaly  Extremities: no cyanosis, no clubbing and no edema  Neurologic: no cranial nerve deficit and speech normal        Recent Labs     02/09/23  0220 02/10/23  0400 02/11/23  0945    143 141   K 4.5 3.7 3.8    104 101   CO2 30* 33* 33*   BUN 25* 27* 23   CREATININE 0.8 0.9 1.0   GLUCOSE 121* 64* 95   CALCIUM 9.2 9.5 9.7       Recent Labs     02/09/23  0220 02/10/23  0400 02/11/23  0945   WBC 10.8 9.1 10.6   RBC 4.03 4.11 4.47   HGB 12.0 12.2 13.4   HCT 38.1 38.5 41.4   MCV 94.5 93.7 92.6   MCH 29.8 29.7 30.0   MCHC 31.5* 31.7* 32.4   RDW 13.3 13.6 13.6    237 246   MPV 10.3 10.4 9.9       Radiology:   XR CHEST PORTABLE    Result Date: 2/10/2023  EXAMINATION: ONE XRAY VIEW OF THE CHEST 2/10/2023 6:52 am COMPARISON: 02/09/2023 HISTORY: ORDERING SYSTEM PROVIDED HISTORY: resp failure TECHNOLOGIST PROVIDED HISTORY: Reason for exam:->resp failure FINDINGS: Heart size is normal.  There are no infiltrates or effusions. Normal chest     XR CHEST PORTABLE    Result Date: 2/9/2023  EXAMINATION: ONE XRAY VIEW OF THE CHEST 2/9/2023 7:07 am COMPARISON: Comparison studies of a January 14 through February 8. Reviewed the CT chest February 7. HISTORY: ORDERING SYSTEM PROVIDED HISTORY: resp failure TECHNOLOGIST PROVIDED HISTORY: Reason for exam:->resp failure FINDINGS: Lungs are normally expanded. Infiltrates, consolidates or pleural effusions are seen. The heart has normal size. Mediastinum appear unremarkable.  There is no perihilar vascular congestion. There is no pneumothorax on the right or on the left. Emphysematous changes towards the upper lobes are seen on the recent CT chest of February 7. No acute cardiopulmonary process. XR CHEST PORTABLE    Result Date: 2/8/2023  EXAMINATION: ONE XRAY VIEW OF THE CHEST 2/8/2023 6:04 am COMPARISON: 7 February 2023 HISTORY: ORDERING SYSTEM PROVIDED HISTORY: resp failure TECHNOLOGIST PROVIDED HISTORY: Reason for exam:->resp failure FINDINGS: The lungs are without acute focal process. There is no effusion or pneumothorax. The cardiomediastinal silhouette is without acute process. The osseous structures are without acute process. No acute process. XR CHEST PORTABLE    Result Date: 2/7/2023  EXAMINATION: ONE XRAY VIEW OF THE CHEST 2/7/2023 12:21 am COMPARISON: 01/15/2023 HISTORY: ORDERING SYSTEM PROVIDED HISTORY: SOB TECHNOLOGIST PROVIDED HISTORY: Reason for exam:->SOB FINDINGS: The lungs are without acute focal process. There is no effusion or pneumothorax. The cardiomediastinal silhouette is without acute process. The osseous structures are without acute process. No acute process. XR CHEST PORTABLE    Result Date: 1/15/2023  EXAMINATION: ONE XRAY VIEW OF THE CHEST 1/15/2023 6:56 am COMPARISON: Chest series from January 14, 2023 HISTORY: ORDERING SYSTEM PROVIDED HISTORY: FINDINGS: Symmetric lung inflation. Mildly coarsened interstitial markings. Atherosclerotic disease and cardiomegaly. No new airspace disease, pleural effusions, or pneumothorax. Osseous and thoracic soft tissue structures demonstrate no acute findings. Stable atherosclerotic disease, cardiomegaly, and coarsened interstitial markings. No new cardiopulmonary pathology. XR CHEST PORTABLE    Result Date: 1/14/2023  EXAMINATION: ONE XRAY VIEW OF THE CHEST 1/14/2023 10:06 am COMPARISON: None.  HISTORY: ORDERING SYSTEM PROVIDED HISTORY: SOB; concern for PNA vs COPD exacerbation vs pulmonary edema TECHNOLOGIST PROVIDED HISTORY: Reason for exam:->SOB; concern for PNA vs COPD exacerbation vs pulmonary edema FINDINGS: Lungs are normally expanded. Heart is normal size. No acute infiltrates, consolidates or pleural effusions are seen. There is no perihilar vascular congestion. Mediastinum appears unremarkable. There is no pneumothorax on the right or on the left. No acute cardiopulmonary process. CTA CHEST W CONTRAST    Result Date: 1/15/2023  EXAMINATION: CTA OF THE CHEST 1/15/2023 8:59 am TECHNIQUE: CTA of the chest was performed after the administration of intravenous contrast.  Multiplanar reformatted images are provided for review. MIP images are provided for review. Automated exposure control, iterative reconstruction, and/or weight based adjustment of the mA/kV was utilized to reduce the radiation dose to as low as reasonably achievable. COMPARISON: CTA chest 04/14/2008 HISTORY: ORDERING SYSTEM PROVIDED HISTORY: dyspnea. COPD TECHNOLOGIST PROVIDED HISTORY: Reason for exam:->dyspnea. COPD FINDINGS: Pulmonary Arteries: No pulmonary embolism to the lobar level. The segmental and subsegmental arteries are not well evaluated due to breathing motion artifact. 3.2 cm diameter pulmonary trunk. Mediastinum: No thoracic aortic aneurysm or dissection. Atherosclerotic disease. The heart is not significantly enlarged. No pericardial effusion. Unremarkable thyroid and esophagus. Lungs/pleura: No pleural effusion or pneumothorax. Expiratory phase appearance of the trachea. Bands of linear atelectasis versus scarring. No pulmonary consolidation. Extensive pulmonary emphysema. Upper Abdomen: Atherosclerotic disease. Roughly 1 cm hepatic cyst.  Probable hepatic steatosis. Soft Tissues/Bones: Degenerative changes of the spine. Stable vertebral height loss. No pulmonary embolism to the lobar level. The distal pulmonary arteries are not well evaluated due to breathing motion artifact.   Enlarged pulmonary trunk suggests pulmonary hypertension, in the appropriate clinical setting. No thoracic aortic aneurysm or dissection. Pulmonary emphysema. CTA PULMONARY W CONTRAST    Result Date: 2/7/2023  EXAMINATION: CTA OF THE CHEST 2/7/2023 5:56 am TECHNIQUE: CTA of the chest was performed after the administration of intravenous contrast.  Multiplanar reformatted images are provided for review. MIP images are provided for review. Automated exposure control, iterative reconstruction, and/or weight based adjustment of the mA/kV was utilized to reduce the radiation dose to as low as reasonably achievable. COMPARISON: None. HISTORY: ORDERING SYSTEM PROVIDED HISTORY: Cute on chronic respiratory failure, elevated D-dimer, concern for pulmonary embolism TECHNOLOGIST PROVIDED HISTORY: Reason for exam:->Cute on chronic respiratory failure, elevated D-dimer, concern for pulmonary embolism Decision Support Exception - unselect if not a suspected or confirmed emergency medical condition->Emergency Medical Condition (MA) FINDINGS: Pulmonary Arteries: Pulmonary arteries are adequately opacified for evaluation. No evidence of intraluminal filling defect to suggest pulmonary embolism. Main pulmonary artery is normal in caliber. Mediastinum: No evidence of mediastinal lymphadenopathy. The heart and pericardium demonstrate no acute abnormality. There is no acute abnormality of the thoracic aorta. Lungs/pleura: The lungs are without acute process. No focal consolidation or pulmonary edema. Upper lobe predominant emphysematous change. No evidence of pleural effusion or pneumothorax. Upper Abdomen:  Limited images of the upper abdomen demonstrate no acute abnormality. Soft Tissues/Bones: No acute bone or soft tissue abnormality. No evidence of pulmonary embolism or acute pulmonary abnormality. Assessment:    Principal Problem:    Hypercapnia  Resolved Problems:    * No resolved hospital problems. *      Plan:  1. Acute Respiratory Failure with Hypercapnia - improved. Pulmonary consult and Intensivist recommendations appreciated. Needs follow up with Pulmonologist and Pulmonary rehab as outpatient.  working on order for NIV for home use. 2.   Anxiety - has been on Ativan 0.5 mg po bid and Pristiq 50 mg daily. To continue Ativan oral and resume Pristiq when able to take oral well. 3.  Tobacco Use disorder - patient was not abstinent. Continue Nicoderm patch 21 mg daily. 4.  Hyperlipidemia - on Lipitor 20 mg daily. Continue same. 5.  Obesity - BMI 34.2. Supportive care. Component of Obesity Hypoventilation syndrome. Continue respiratory support. NOTE: This report was transcribed using voice recognition software. Every effort was made to ensure accuracy; however, inadvertent computerized transcription errors may be present.     Electronically signed by Alex Palacios MD on 2/11/2023 at 2:36 PM

## 2023-02-12 LAB
BLOOD CULTURE, ROUTINE: NORMAL
CULTURE, BLOOD 2: NORMAL

## 2023-02-12 PROCEDURE — 6370000000 HC RX 637 (ALT 250 FOR IP): Performed by: INTERNAL MEDICINE

## 2023-02-12 PROCEDURE — 94660 CPAP INITIATION&MGMT: CPT

## 2023-02-12 PROCEDURE — 6360000002 HC RX W HCPCS: Performed by: INTERNAL MEDICINE

## 2023-02-12 PROCEDURE — 2700000000 HC OXYGEN THERAPY PER DAY

## 2023-02-12 PROCEDURE — 99233 SBSQ HOSP IP/OBS HIGH 50: CPT | Performed by: FAMILY MEDICINE

## 2023-02-12 PROCEDURE — 2060000000 HC ICU INTERMEDIATE R&B

## 2023-02-12 PROCEDURE — 94640 AIRWAY INHALATION TREATMENT: CPT

## 2023-02-12 PROCEDURE — 6370000000 HC RX 637 (ALT 250 FOR IP): Performed by: FAMILY MEDICINE

## 2023-02-12 PROCEDURE — 2580000003 HC RX 258: Performed by: INTERNAL MEDICINE

## 2023-02-12 RX ADMIN — BUDESONIDE 500 MCG: 0.5 SUSPENSION RESPIRATORY (INHALATION) at 19:36

## 2023-02-12 RX ADMIN — ESCITALOPRAM 10 MG: 10 TABLET, FILM COATED ORAL at 08:31

## 2023-02-12 RX ADMIN — ARFORMOTEROL TARTRATE 15 MCG: 15 SOLUTION RESPIRATORY (INHALATION) at 19:36

## 2023-02-12 RX ADMIN — ARFORMOTEROL TARTRATE 15 MCG: 15 SOLUTION RESPIRATORY (INHALATION) at 08:42

## 2023-02-12 RX ADMIN — IPRATROPIUM BROMIDE AND ALBUTEROL SULFATE 1 AMPULE: 2.5; .5 SOLUTION RESPIRATORY (INHALATION) at 12:23

## 2023-02-12 RX ADMIN — IPRATROPIUM BROMIDE AND ALBUTEROL SULFATE 1 AMPULE: 2.5; .5 SOLUTION RESPIRATORY (INHALATION) at 16:12

## 2023-02-12 RX ADMIN — SODIUM CHLORIDE, PRESERVATIVE FREE 10 ML: 5 INJECTION INTRAVENOUS at 20:02

## 2023-02-12 RX ADMIN — FAMOTIDINE 20 MG: 20 TABLET, FILM COATED ORAL at 08:31

## 2023-02-12 RX ADMIN — BUDESONIDE 500 MCG: 0.5 SUSPENSION RESPIRATORY (INHALATION) at 08:42

## 2023-02-12 RX ADMIN — ENOXAPARIN SODIUM 40 MG: 100 INJECTION SUBCUTANEOUS at 08:31

## 2023-02-12 RX ADMIN — SODIUM CHLORIDE, PRESERVATIVE FREE 10 ML: 5 INJECTION INTRAVENOUS at 08:31

## 2023-02-12 RX ADMIN — FAMOTIDINE 20 MG: 20 TABLET, FILM COATED ORAL at 20:02

## 2023-02-12 RX ADMIN — IPRATROPIUM BROMIDE AND ALBUTEROL SULFATE 1 AMPULE: 2.5; .5 SOLUTION RESPIRATORY (INHALATION) at 19:36

## 2023-02-12 RX ADMIN — METHYLPREDNISOLONE SODIUM SUCCINATE 40 MG: 40 INJECTION, POWDER, FOR SOLUTION INTRAMUSCULAR; INTRAVENOUS at 08:31

## 2023-02-12 RX ADMIN — IPRATROPIUM BROMIDE AND ALBUTEROL SULFATE 1 AMPULE: 2.5; .5 SOLUTION RESPIRATORY (INHALATION) at 08:42

## 2023-02-12 RX ADMIN — IPRATROPIUM BROMIDE AND ALBUTEROL SULFATE 1 AMPULE: 2.5; .5 SOLUTION RESPIRATORY (INHALATION) at 23:22

## 2023-02-12 ASSESSMENT — PAIN SCALES - GENERAL: PAINLEVEL_OUTOF10: 0

## 2023-02-12 NOTE — PROGRESS NOTES
Contacted hospitalist NP for elevated blood pressure. Patient currently is asymptomatic and without complaints, however trending high blood pressure. PRN hydralazine ordered.

## 2023-02-12 NOTE — PROGRESS NOTES
Date: 2/12/2023    Time: 9:09 AM    Patient Placed On BIPAP/CPAP/ Non-Invasive Ventilation? Yes    If no must comment. Facial area red/color change? No           If YES are Blister/Lesion present? No   If yes must notify nursing staff  BIPAP/CPAP skin barrier?   Yes    Skin barrier type:mepilexlite       Comments:        Alexys Townsend RCP

## 2023-02-12 NOTE — PROGRESS NOTES
02/11/23 7975   NIV Type   Skin Protection for O2 Device Yes   Mode AVAPS   Mask Type Full face mask   Mask Size Small   Settings/Measurements   CPAP/EPAP 5 cmH2O   Vt (Set, mL) 450 mL   Vt (Measured) 537 mL   Rate Ordered 16   Resp 26   FiO2  35 %   Mask Leak (lpm) 19 lpm   Comfort Level Good   Using Accessory Muscles No   Alarm Settings   Alarms On Y   Low Pressure (cmH2O)   (in wall)   Patient Observation   Observations red outlet

## 2023-02-12 NOTE — PROGRESS NOTES
[facility] Progress Note    Admitting Date and Time: 2/6/2023 11:31 PM  Admit Dx: Hypercapnia [R06.89]  COPD exacerbation (HCC) [J44.1]  SIRS (systemic inflammatory response syndrome) (HCC) [R65.10]  D-dimer, elevated [R79.89]  Acute on chronic respiratory failure with hypoxia and hypercapnia (HCC) [J96.21, J96.22]  Leukocytosis, unspecified type [D72.829]    Subjective:  Patient is being followed for Hypercapnia [R06.89]  COPD exacerbation ([address] [J44.1]  SIRS (systemic inflammatory response syndrome) (HCC) [R65.10]  D-dimer, elevated [R79.89]  Acute on chronic respiratory failure with hypoxia and hypercapnia (HCC) [J96.21, J96.22]  Leukocytosis, unspecified type [D72.829]   Pt feels ready for discharge. Awaiting insurance approval for the NIV. Per RN:   No new concerns. ROS: denies fever, chills, cp, sob, n/v, HA unless stated above.       [START ON 2/13/2023] predniSONE  30 mg Oral Daily    famotidine  20 mg Oral BID    nicotine  1 patch TransDERmal Daily    escitalopram  10 mg Oral Daily    sodium chloride flush  5-40 mL IntraVENous 2 times per day    enoxaparin  40 mg SubCUTAneous Daily    ipratropium-albuterol  1 ampule Inhalation Q4H WA    budesonide  0.5 mg Nebulization BID    arformoterol tartrate  15 mcg Nebulization BID     hydrALAZINE, 5 mg, Q6H PRN  glucose, 4 tablet, PRN  dextrose bolus, 125 mL, PRN   Or  dextrose bolus, 250 mL, PRN  glucagon (rDNA), 1 mg, PRN  dextrose, , Continuous PRN  sodium chloride, 1 spray, Q2H PRN  sodium chloride flush, 5-40 mL, PRN  sodium chloride, , PRN  ondansetron, 4 mg, Q8H PRN   Or  ondansetron, 4 mg, Q6H PRN  acetaminophen, 650 mg, Q6H PRN   Or  acetaminophen, 650 mg, Q6H PRN  bisacodyl, 10 mg, Daily PRN         Objective:    BP (!) 153/82   Pulse 85   Temp 97.9 °F (36.6 °C) (Oral)   Resp 18   Ht 5' 2\" (1.575 m)   Wt 208 lb 3.2 oz (94.4 kg)   SpO2 94%   BMI 38.08 kg/m²     General Appearance: alert and oriented to person, place and time and in no acute distress. Slight increase in breathing with conversation. Skin: warm and dry  Head: normocephalic and atraumatic  Eyes: pupils equal, round, and reactive to light, extraocular eye movements intact, conjunctivae normal  Neck: neck supple and non tender without mass   Pulmonary/Chest: clear to auscultation bilaterally- no wheezes, rales or rhonchi, normal air movement, no respiratory distress  Cardiovascular: normal rate, normal S1 and S2 and no carotid bruits  Abdomen: soft, non-tender, non-distended, normal bowel sounds, no masses or organomegaly  Extremities: no cyanosis, no clubbing and no edema  Neurologic: no cranial nerve deficit and speech normal        Recent Labs     02/10/23  0400 02/11/23  0945    141   K 3.7 3.8    101   CO2 33* 33*   BUN 27* 23   CREATININE 0.9 1.0   GLUCOSE 64* 95   CALCIUM 9.5 9.7       Recent Labs     02/10/23  0400 02/11/23  0945   WBC 9.1 10.6   RBC 4.11 4.47   HGB 12.2 13.4   HCT 38.5 41.4   MCV 93.7 92.6   MCH 29.7 30.0   MCHC 31.7* 32.4   RDW 13.6 13.6    246   MPV 10.4 9.9       Radiology:   No results found. Assessment:    Principal Problem:    Hypercapnia  Resolved Problems:    * No resolved hospital problems. *      Plan:  1. Acute Respiratory Failure with Hypercapnia - improved. Pulmonary consult and Intensivist recommendations appreciated. Needs follow up with Pulmonologist and Pulmonary rehab as outpatient.  working on order for NIV for home use. 2.   Anxiety - has been on Ativan 0.5 mg po bid and Pristiq 50 mg daily. To continue Ativan oral and resume Pristiq when able to take oral well. 3.  Tobacco Use disorder - patient was not abstinent. Continue Nicoderm patch 21 mg daily. 4.  Hyperlipidemia - on Lipitor 20 mg daily. Continue same. 5.  Obesity - BMI 34.2. Supportive care. Component of Obesity Hypoventilation syndrome. Continue respiratory support.     NOTE: This report was transcribed using voice recognition software. Every effort was made to ensure accuracy; however, inadvertent computerized transcription errors may be present.     Electronically signed by Bianca Cherry MD on 2/12/2023 at 1:09 PM

## 2023-02-13 ENCOUNTER — TELEPHONE (OUTPATIENT)
Dept: FAMILY MEDICINE CLINIC | Age: 73
End: 2023-02-13

## 2023-02-13 VITALS
DIASTOLIC BLOOD PRESSURE: 80 MMHG | SYSTOLIC BLOOD PRESSURE: 130 MMHG | HEIGHT: 62 IN | OXYGEN SATURATION: 95 % | HEART RATE: 88 BPM | RESPIRATION RATE: 21 BRPM | BODY MASS INDEX: 37.78 KG/M2 | TEMPERATURE: 98.2 F | WEIGHT: 205.3 LBS

## 2023-02-13 PROCEDURE — 2580000003 HC RX 258: Performed by: INTERNAL MEDICINE

## 2023-02-13 PROCEDURE — 2700000000 HC OXYGEN THERAPY PER DAY

## 2023-02-13 PROCEDURE — 6370000000 HC RX 637 (ALT 250 FOR IP): Performed by: INTERNAL MEDICINE

## 2023-02-13 PROCEDURE — 6370000000 HC RX 637 (ALT 250 FOR IP): Performed by: FAMILY MEDICINE

## 2023-02-13 PROCEDURE — 99239 HOSP IP/OBS DSCHRG MGMT >30: CPT | Performed by: FAMILY MEDICINE

## 2023-02-13 PROCEDURE — 94660 CPAP INITIATION&MGMT: CPT

## 2023-02-13 PROCEDURE — 94640 AIRWAY INHALATION TREATMENT: CPT

## 2023-02-13 PROCEDURE — 6360000002 HC RX W HCPCS: Performed by: INTERNAL MEDICINE

## 2023-02-13 RX ORDER — FORMOTEROL FUMARATE 20 UG/2ML
20 SOLUTION RESPIRATORY (INHALATION) 2 TIMES DAILY
Qty: 120 ML | Refills: 0 | COMMUNITY
Start: 2023-02-13 | End: 2023-02-13 | Stop reason: HOSPADM

## 2023-02-13 RX ORDER — PREDNISONE 10 MG/1
TABLET ORAL
Qty: 32 TABLET | Refills: 0 | Status: SHIPPED | OUTPATIENT
Start: 2023-02-13 | End: 2023-03-05

## 2023-02-13 RX ADMIN — ARFORMOTEROL TARTRATE 15 MCG: 15 SOLUTION RESPIRATORY (INHALATION) at 08:45

## 2023-02-13 RX ADMIN — FAMOTIDINE 20 MG: 20 TABLET, FILM COATED ORAL at 10:25

## 2023-02-13 RX ADMIN — PREDNISONE 30 MG: 5 TABLET ORAL at 10:25

## 2023-02-13 RX ADMIN — ENOXAPARIN SODIUM 40 MG: 100 INJECTION SUBCUTANEOUS at 10:25

## 2023-02-13 RX ADMIN — IPRATROPIUM BROMIDE AND ALBUTEROL SULFATE 1 AMPULE: 2.5; .5 SOLUTION RESPIRATORY (INHALATION) at 08:45

## 2023-02-13 RX ADMIN — ESCITALOPRAM 10 MG: 10 TABLET, FILM COATED ORAL at 10:25

## 2023-02-13 RX ADMIN — IPRATROPIUM BROMIDE AND ALBUTEROL SULFATE 1 AMPULE: 2.5; .5 SOLUTION RESPIRATORY (INHALATION) at 12:33

## 2023-02-13 RX ADMIN — BUDESONIDE 500 MCG: 0.5 SUSPENSION RESPIRATORY (INHALATION) at 08:45

## 2023-02-13 RX ADMIN — SODIUM CHLORIDE, PRESERVATIVE FREE 10 ML: 5 INJECTION INTRAVENOUS at 10:25

## 2023-02-13 NOTE — PROGRESS NOTES
02/12/23 2330   NIV Type   Skin Protection for O2 Device Yes   Mode AVAPS   Mask Type Full face mask   Mask Size Small   Settings/Measurements   CPAP/EPAP 5 cmH2O   Vt (Set, mL) 450 mL   Vt (Measured) 452 mL   Rate Ordered 16   Resp 22   FiO2  35 %   Mask Leak (lpm) 24 lpm   Comfort Level Good   Using Accessory Muscles No   Alarm Settings   Alarms On Y   Low Pressure (cmH2O)   (in wall)   Patient Observation   Observations red outlet

## 2023-02-13 NOTE — PROGRESS NOTES
Paged Dr Opal Vasquez via answering service to notify that pt is set up to have NIV delivered at 3pm and d/c is being set up prior, to check with him to assure this is ok. [id number] Received call back from Dr Opal Vasquez , ok for d/c , f/u in the office.

## 2023-02-13 NOTE — PROGRESS NOTES
Date: 2/13/2023    Time: 9:07 AM    Patient Placed On BIPAP/CPAP/ Non-Invasive Ventilation? Yes    If no must comment. Facial area red/color change? No           If YES are Blister/Lesion present? No   If yes must notify nursing staff  BIPAP/CPAP skin barrier?   Yes    Skin barrier type:mepilexlite       Comments:        Alexys Townsend RCP

## 2023-02-13 NOTE — PROGRESS NOTES
P Quality Flow/Interdisciplinary Rounds Progress Note        Quality Flow Rounds held on February 13, 2023    Disciplines Attending:  Bedside Nurse, , , and Nursing Unit Leadership    Fausto Acharya was admitted on 2/6/2023 11:31 PM    Anticipated Discharge Date:  Expected Discharge Date: 02/13/23    Disposition:    Remy Score:  Remy Scale Score: 20    Readmission Risk              Risk of Unplanned Readmission:  11           Discussed patient goal for the day, patient clinical progression, and barriers to discharge. The following Goal(s) of the Day/Commitment(s) have been identified:   discharge today by 3pm to have NIV delivered at home.        Darian Armando RN  February 13, 2023

## 2023-02-13 NOTE — CARE COORDINATION
NIV approved w/home set up for 3 pm today w/[employer]. [facility] has accepted pt and CM notified of discharge today. Will follow.    HERMES CanN, RN  [facility] Case Management  [phone]

## 2023-02-13 NOTE — TELEPHONE ENCOUNTER
Brooke Amaro from [facility] called. Estephanie Blanca should be released from the hospital today. Will you follow for nursing visits, PT and OT?

## 2023-02-14 ENCOUNTER — CARE COORDINATION (OUTPATIENT)
Dept: CASE MANAGEMENT | Age: 73
End: 2023-02-14

## 2023-02-14 ENCOUNTER — TELEPHONE (OUTPATIENT)
Dept: FAMILY MEDICINE CLINIC | Age: 73
End: 2023-02-14

## 2023-02-14 DIAGNOSIS — R06.89 HYPERCAPNIA: Primary | ICD-10-CM

## 2023-02-14 PROCEDURE — 1111F DSCHRG MED/CURRENT MED MERGE: CPT | Performed by: FAMILY MEDICINE

## 2023-02-14 NOTE — DISCHARGE SUMMARY
[facility] Physician Discharge Summary       Rosa Mercer MD  [address], [address] [facility]  [address] [address]    Follow up      Regulo Swenson MD  [address] [facility]  [address]  [address]  [phone]    Follow up      [address] [facility]  [phone]  Follow up  [address] [facility] that will see you upon discharge. Activity level: As tolerated     Dispo:    Home      Condition on discharge: Stable     Patient ID:  Iain Bansal  [id number]  30 y.o.  1950    Admit date: 2/6/2023    Discharge date and time:  2/14/2023  7:23 AM    Admission Diagnoses: Principal Problem:    Hypercapnia  Resolved Problems:    * No resolved hospital problems. *      Discharge Diagnoses: Principal Problem:    Hypercapnia  Resolved Problems:    * No resolved hospital problems. *      Consults:  IP CONSULT TO CRITICAL CARE  IP CONSULT TO PULMONOLOGY  IP CONSULT TO HOME CARE NEEDS    Procedures: N/A    Hospital Course:   Patient Iain Bansal is a 67 y.o. presented with Hypercapnia [R06.89]  COPD exacerbation ([facility] [address] [J44.1]  SIRS (systemic inflammatory response syndrome) (HCC) [R65.10]  D-dimer, elevated [R79.89]  Acute on chronic respiratory failure with hypoxia and hypercapnia (HCC) [J96.21, J96.22]  Leukocytosis, unspecified type [D72.829]  Patient was admitted for respiratory support. She required NIV. She was seen by Pulmonary. Her respiratory status gradually improved. Home NIV was recommended by Pulmonary. NIV was approved by her insurance. She was stable for discharge to home. Gradual taper of steroids was prescribed. She is to continue all respiratory meds and neb treatments. See discharge med reconciliation. F/U with PCP in 1 week. F/U with Pulmonary per their recommendations.     Discharge Exam:    General Appearance: alert and oriented to person, place and time and in no acute distress  Skin: warm and dry  Head: normocephalic and atraumatic  Eyes: pupils equal, round, and reactive to light, extraocular eye movements intact, conjunctivae normal  Neck: neck supple and non tender without mass   Pulmonary/Chest: clear to auscultation bilaterally- no wheezes, rales or rhonchi, normal air movement, no respiratory distress  Cardiovascular: normal rate, normal S1 and S2 and no carotid bruits  Abdomen: soft, non-tender, non-distended, normal bowel sounds, no masses or organomegaly  Extremities: no cyanosis, no clubbing and no edema  Neurologic: no cranial nerve deficit and speech allegra    LABS:  Recent Labs     02/11/23  0945      K 3.8      CO2 33*   BUN 23   CREATININE 1.0   GLUCOSE 95   CALCIUM 9.7       Recent Labs     02/11/23  0945   WBC 10.6   RBC 4.47   HGB 13.4   HCT 41.4   MCV 92.6   MCH 30.0   MCHC 32.4   RDW 13.6      MPV 9.9     Imaging:  XR CHEST PORTABLE    Result Date: 2/7/2023  EXAMINATION: ONE XRAY VIEW OF THE CHEST 2/7/2023 12:21 am COMPARISON: 01/15/2023 HISTORY: ORDERING SYSTEM PROVIDED HISTORY: SOB TECHNOLOGIST PROVIDED HISTORY: Reason for exam:->SOB FINDINGS: The lungs are without acute focal process. There is no effusion or pneumothorax. The cardiomediastinal silhouette is without acute process. The osseous structures are without acute process. No acute process. CTA PULMONARY W CONTRAST    Result Date: 2/7/2023  EXAMINATION: CTA OF THE CHEST 2/7/2023 5:56 am TECHNIQUE: CTA of the chest was performed after the administration of intravenous contrast.  Multiplanar reformatted images are provided for review. MIP images are provided for review. Automated exposure control, iterative reconstruction, and/or weight based adjustment of the mA/kV was utilized to reduce the radiation dose to as low as reasonably achievable. COMPARISON: None.  HISTORY: ORDERING SYSTEM PROVIDED HISTORY: Cute on chronic respiratory failure, elevated D-dimer, concern for pulmonary embolism TECHNOLOGIST PROVIDED HISTORY: Reason for exam:->Cute on chronic respiratory failure, elevated D-dimer, concern for pulmonary embolism Decision Support Exception - unselect if not a suspected or confirmed emergency medical condition->Emergency Medical Condition (MA) FINDINGS: Pulmonary Arteries: Pulmonary arteries are adequately opacified for evaluation. No evidence of intraluminal filling defect to suggest pulmonary embolism. Main pulmonary artery is normal in caliber. Mediastinum: No evidence of mediastinal lymphadenopathy. The heart and pericardium demonstrate no acute abnormality. There is no acute abnormality of the thoracic aorta. Lungs/pleura: The lungs are without acute process. No focal consolidation or pulmonary edema. Upper lobe predominant emphysematous change. No evidence of pleural effusion or pneumothorax. Upper Abdomen:  Limited images of the upper abdomen demonstrate no acute abnormality. Soft Tissues/Bones: No acute bone or soft tissue abnormality. No evidence of pulmonary embolism or acute pulmonary abnormality. Patient Instructions:      Medication List        START taking these medications      predniSONE 10 MG tablet  Commonly known as: DELTASONE  Take 3 tablets by mouth daily for 5 days, THEN 2 tablets daily for 5 days, THEN 1 tablet daily for 5 days, THEN 0.5 tablets every other day for 5 days.   Start taking on: February 13, 2023     sodium chloride 0.65 % nasal spray  Commonly known as: OCEAN, BABY AYR  1 spray by Nasal route every 2 hours as needed for Congestion            CONTINUE taking these medications      albuterol sulfate  (90 Base) MCG/ACT inhaler  Commonly known as: Ventolin HFA  Inhale 2 puffs into the lungs every 6 hours as needed for Wheezing     arformoterol tartrate 15 MCG/2ML Nebu  Commonly known as: BROVANA  Take 1 ampule by nebulization 2 times daily     atorvastatin 20 MG tablet  Commonly known as: LIPITOR     budesonide 0.5 MG/2ML nebulizer suspension  Commonly known as: PULMICORT     desvenlafaxine succinate 50 MG Tb24 extended release tablet  Commonly known as: PRISTIQ     ipratropium-albuterol 0.5-2.5 (3) MG/3ML Soln nebulizer solution  Commonly known as: DUONEB  Inhale 3 mLs into the lungs every 4 hours (while awake)     OXYGEN  Notes to patient: Use 1 L/min while at rest     varenicline 0.5 MG tablet  Commonly known as: Chantix  Take 1-2 tablets by mouth See Admin Instructions 0.5mg DAILY for 3 days followed by 0.5mg TWICE DAILY for 4 days followed by 1mg TWICE DAILY            STOP taking these medications      celecoxib 200 MG capsule  Commonly known as: CELEBREX     formoterol 20 MCG/2ML nebulizer solution  Commonly known as: PERFOROMIST     loratadine 10 MG tablet  Commonly known as: CLARITIN     LORazepam 0.5 MG tablet  Commonly known as: Ativan     sulfaSALAzine 500 MG EC tablet  Commonly known as: AZULFIDINE               Where to Get Your Medications        These medications were sent to [address] [place] [address] [phone]  [address] [place], [place] [id number]      Phone: [phone]   predniSONE 10 MG tablet       You can get these medications from any pharmacy    You don't need a prescription for these medications  sodium chloride 0.65 % nasal spray       Total discharge time:  31 minutes    Note that more than 30 minutes was spent in preparing discharge papers, discussing discharge with patient, medication review, etc.    Signed:  Electronically signed by Mardene Halsted, MD on 2/14/2023 at 7:23 AM

## 2023-02-14 NOTE — CARE COORDINATION
[facility] Care Transitions Initial Follow Up Call    Call within 2 business days of discharge: Yes    Patient Current Location:  Home: [address] [place] [address]    Care Transition Nurse contacted the family by telephone to perform post hospital discharge assessment. Verified name and  with family as identifiers. Provided introduction to self, and explanation of the Care Transition Nurse role. Patient: Sonny Villa Patient : 1950   MRN: [id number]  Reason for Admission: Hypercapnia  Discharge Date: 23 RARS: Readmission Risk Score: 12.3      Last Discharge  [address]       Date Complaint Diagnosis Description Type Department Provider    23 Shortness of Breath Acute on chronic respiratory failure with hypoxia and hypercapnia ([facility] [address] . .. ED to Hosp-Admission (Discharged) (ADMITTED) ERNESTINE Michele MD; Roseann Salazar. .. Was this an external facility discharge? No Discharge Facility: [address] [place] [address]    Challenges to be reviewed by the provider   Additional needs identified to be addressed with provider: No  none               Method of communication with provider: none. Spoke with patient  (Jennifer Medrano) on communication release form regarding initial TCM/hospital discharge (low risk) follow up. Jennifer Medrano states patient doing better since discharge and advises her breathing has improved and is back to baseline. Jennifer [facility] patient obtained NIV and had on last night for 7.5 hours. Denies patient having any CXR on admission showed no acute process and CTA showed no PE or abnormality. Jennifer Medrano [facility] patient uses home oxygen 1 lpm continuous during day. [facility] [place] oxygen saturation while on phone with this CTN is 93% and HR is 83 bpm. [facility] [place] patient has been smoke free for 30 days ad uses Nicotine patches. Provided a complete review of home meds with Ray who confirms patient obtained new meds ordered on discharge.  Reviewed Prednisone dosing and advised of importance for patient to take as directed until completely finished. Rina Yoon states patient is not anxious and not taking Pristique and will discuss with PCP if needing to resume. 1111F code entered. Rina Yoon states has a call out to Dr. Eugene Lainez (PCP) re: scheduling HFU appt. CTN will route [facility]  advising of discharge and the need to schedule 7 day HFU appt. Rina Yoon states nurse from [facility] is currently at home for [facility] visit. Reviewed COPD zone tool and knowing when to seek medical attention. Denies any needs or concerns. Rina Yoon is receptive to subsequent follow up. CTN will continue to follow for Care Transition. Care Transition Nurse reviewed discharge instructions, medical action plan, and red flags with family who verbalized understanding. The family was given an opportunity to ask questions and does not have any further questions or concerns at this time. Were discharge instructions available to patient? Yes. Reviewed appropriate site of care based on symptoms and resources available to patient including: PCP  Specialist  Urgent care clinics  When to call 911  Condition related references. The family agrees to contact the PCP office for questions related to their healthcare. Advance Care Planning:   Does patient have an Advance Directive: reviewed and current. Medication reconciliation was performed with family, who verbalizes understanding of administration of home medications. Medications reviewed, 1111F entered: yes    Was patient discharged with a pulse oximeter? no    Non-face-to-face services provided:  Scheduled appointment with PCP-CTN confirmed with patient  ([place] [facility]) having a call out to PCP re: scheduling f/u appt. CTN will route to [facility]  advising of discharge and the need to schedule HFU appt with Dr. Deidre Patel (PCP).    Scheduled appointment with Specialist-CTN confirmed with Rina Yoon that patient is scheudled for f/u with Amanda Alvarado NP with pulmonology on 2/21/23 at 1:45 pm.   Obtained and reviewed discharge summary and/or continuity of care documents  Education of patient/family/caregiver/guardian to support self-management-Discussed COPd zone tool and knowing when to seek medical attention. Assessment and support for treatment adherence and medication management-Discussed Prednisone dosing and the importance for patient to take as directed until completely finished. Establishment or re-establishment of referrals-CTN confirmed with Ray that nurse from [facility] is currently at home for [facility] visit. Offered patient enrollment in the Remote Patient Monitoring (RPM) program for in-home monitoring:  Did not discuss on initial all. .    Care Transitions 24 Hour Call    Schedule Follow Up Appointment with PCP: Declined  Do you have a copy of your discharge instructions?: Yes  Do you have all of your prescriptions and are they filled?: Yes  Have you been contacted by a [id number] [facility] Pharmacist?: No  Have you scheduled your follow up appointment?: Yes  How are you going to get to your appointment?: Car - family or friend to transport  Do you have support at home?: Partner/Spouse/SO  Do you feel like you have everything you need to keep you well at home?: Yes  Care Transitions Interventions         Discussed follow-up appointments. If no appointment was previously scheduled, appointment scheduling offered: Yes. Is follow up appointment scheduled within 7 days of discharge? Yes. Follow Up  Future Appointments   Date Time Provider Franco Heller   2/21/2023  1:45 PM CARLY Paz - NP [facility]   3/3/2023  2:30 PM Elise Olvera MD [facility]   4/26/2023  2:30 PM Elise Olvera MD [facility]   10/30/2023  2:30 PM Elise Olvera MD [address] Transition Nurse provided contact information. Plan for follow-up call in 5-7 days based on severity of symptoms and risk factors.   Plan for next call: follow-up appointment-Did patient get scheduled for HFU appt?     Mejia Schwartz, APRN

## 2023-02-14 NOTE — TELEPHONE ENCOUNTER
Pt left a vm stating that she was released from the hospital yesterday and has many questions about the medications she was given in the hospital and not given. Pt wants to know what she is supposed to take and not take. Pt is asking for provider to call her regarding this. Please advise.     Electronically signed by Griselda Skates, MA on 2/14/2023 at 11:07 AM

## 2023-02-14 NOTE — TELEPHONE ENCOUNTER
Pt was takingLexapro before going into the hospital. Should she still be on medication? If so, please send to pharmacy. She was taking Pristiq 50 mg daily prior to the hospital. She was taking pristiq, ativan and chantix. She is no longer on ativan or chantix. Should she go back on Pristiq?   [id number] in [place]

## 2023-02-14 NOTE — TELEPHONE ENCOUNTER
Needs to be either on lexapro or pristiq, whichever they feel works best.  If she has been off since hospital and want to see how she does off then  they can stop and we will see.   May need to go back on

## 2023-02-14 NOTE — TELEPHONE ENCOUNTER
[facility] Nurse was not available-So I called patient but she was napping.  Ildefonso Mejias was instructed to have patient take the Pristiq and D/C the Lexapro. Ildefonso Randell would like to know if that medication is necessary as she has not had the anxiety like she did before since the breathing is under control.

## 2023-02-15 ENCOUNTER — TELEMEDICINE (OUTPATIENT)
Dept: FAMILY MEDICINE CLINIC | Age: 73
End: 2023-02-15

## 2023-02-15 ENCOUNTER — TELEPHONE (OUTPATIENT)
Dept: FAMILY MEDICINE CLINIC | Age: 73
End: 2023-02-15

## 2023-02-15 DIAGNOSIS — F32.0 MAJOR DEPRESSIVE DISORDER, SINGLE EPISODE, MILD (HCC): ICD-10-CM

## 2023-02-15 DIAGNOSIS — J44.9 SEVERE CHRONIC OBSTRUCTIVE PULMONARY DISEASE (HCC): ICD-10-CM

## 2023-02-15 DIAGNOSIS — J44.1 COPD EXACERBATION (HCC): ICD-10-CM

## 2023-02-15 DIAGNOSIS — J96.01 ACUTE RESPIRATORY FAILURE WITH HYPOXIA AND HYPERCARBIA (HCC): Primary | ICD-10-CM

## 2023-02-15 DIAGNOSIS — E87.29 RESPIRATORY ACIDOSIS: ICD-10-CM

## 2023-02-15 DIAGNOSIS — Z09 HOSPITAL DISCHARGE FOLLOW-UP: ICD-10-CM

## 2023-02-15 DIAGNOSIS — J96.02 ACUTE RESPIRATORY FAILURE WITH HYPOXIA AND HYPERCARBIA (HCC): Primary | ICD-10-CM

## 2023-02-15 ASSESSMENT — PATIENT HEALTH QUESTIONNAIRE - PHQ9
9. THOUGHTS THAT YOU WOULD BE BETTER OFF DEAD, OR OF HURTING YOURSELF: 0
SUM OF ALL RESPONSES TO PHQ QUESTIONS 1-9: 2
10. IF YOU CHECKED OFF ANY PROBLEMS, HOW DIFFICULT HAVE THESE PROBLEMS MADE IT FOR YOU TO DO YOUR WORK, TAKE CARE OF THINGS AT HOME, OR GET ALONG WITH OTHER PEOPLE: 0
7. TROUBLE CONCENTRATING ON THINGS, SUCH AS READING THE NEWSPAPER OR WATCHING TELEVISION: 0
3. TROUBLE FALLING OR STAYING ASLEEP: 1
SUM OF ALL RESPONSES TO PHQ QUESTIONS 1-9: 6
SUM OF ALL RESPONSES TO PHQ QUESTIONS 1-9: 2
SUM OF ALL RESPONSES TO PHQ QUESTIONS 1-9: 2
SUM OF ALL RESPONSES TO PHQ9 QUESTIONS 1 & 2: 2
1. LITTLE INTEREST OR PLEASURE IN DOING THINGS: 1
SUM OF ALL RESPONSES TO PHQ QUESTIONS 1-9: 6
4. FEELING TIRED OR HAVING LITTLE ENERGY: 1
1. LITTLE INTEREST OR PLEASURE IN DOING THINGS: 1
2. FEELING DOWN, DEPRESSED OR HOPELESS: 1
SUM OF ALL RESPONSES TO PHQ QUESTIONS 1-9: 6
SUM OF ALL RESPONSES TO PHQ QUESTIONS 1-9: 6
5. POOR APPETITE OR OVEREATING: 1
8. MOVING OR SPEAKING SO SLOWLY THAT OTHER PEOPLE COULD HAVE NOTICED. OR THE OPPOSITE, BEING SO FIGETY OR RESTLESS THAT YOU HAVE BEEN MOVING AROUND A LOT MORE THAN USUAL: 0
2. FEELING DOWN, DEPRESSED OR HOPELESS: 1
SUM OF ALL RESPONSES TO PHQ QUESTIONS 1-9: 2
6. FEELING BAD ABOUT YOURSELF - OR THAT YOU ARE A FAILURE OR HAVE LET YOURSELF OR YOUR FAMILY DOWN: 1
SUM OF ALL RESPONSES TO PHQ9 QUESTIONS 1 & 2: 2

## 2023-02-15 NOTE — PROGRESS NOTES
Post-Discharge Transitional Care  Follow Up      Josue Lowe   YOB: 1950    Date of Office Visit:  2/15/2023  Date of Hospital Admission: 2/6/23  Date of Hospital Discharge: 2/13/23  Risk of hospital readmission (high >=14%. Medium >=10%) :Readmission Risk Score: 12.3      Care management risk score Rising risk (score 2-5) and Complex Care (Scores >=6): No Risk Score On File     Non face to face  following discharge, date last encounter closed (first attempt may have been earlier): 02/14/2023    Call initiated 2 business days of discharge: Yes    ASSESSMENT/PLAN:   Below is the assessment and plan developed based on review of pertinent history, physical exam, labs, studies, and medications. Acute respiratory failure with hypoxia and hypercarbia ([facility] [address]  Admitted with sob and CO2 90  Placed on avap and improved wth her hypercarbia and acidosis  Emperic  abx, cultures negative  Stable, cont meds recheck 1 mos      Hospital discharge follow-up  -     ND DISCHARGE MEDS RECONCILED W/ CURRENT OUTPATIENT MED LIST  Respiratory acidosis  COPD exacerbation ([facility] [address]  Admitted with sob and CO2 90  Placed on avap and improved wth her hypercarbia and acidosis  Emperic  abx, cultures negative  Stable, cont meds recheck 1mos      Severe chronic obstructive pulmonary disease ([facility] [address]  Admitted with sob and CO2 90  Placed on avap and improved wth her hypercarbia and acidosis  Emperic  abx, cultures negative  Stable, cont meds recheck  mos    Major depressive disorder, single episode, mild ([facility] [address]  Started on pristiq day before admission and xanax  Continue post discharge on this, stop lexapro  Stable, cont meds recheck 1 mos   Medical Decision Making: moderate complexity  Return in 1 month (on 3/15/2023). Subjective:   HPI:  Follow up of Hospital problems/diagnosis(es):       She was hospitalized  initially on January 14th and Discharged on January 17th.   She received oral steroids until January 19, 2023  Patient re admitted to [facility] with acute hypoxic resp failure w O2 in 70's , she had hypercarbia and resp acidosis pCO2 90  IV Solumedrol of 125 mg x1, Rocephin and Doxycycline. Blood cultures have been ordered x 2. Intensivist consult. Pulmonary consult with Dr. Girma Batista who knows her well. CT of chest was negative for PE.  shifted to AVAPs from BIPAP and shifted to ICU for further management. Inpatient course: Discharge summary reviewed- see chart. Continue steroids  Continue supplemental oxygen. Keep SpO2 between 88%-92%. Increase nicotine patch up to 21 for potential withdrawal side effects  Continue aerosols  Check ABGs, if PCO2 is greater than 45 then likely, NIV for home to reduce the risk of readmission for respiratory failure with hypercapnia and death. need to wear it for at least 4-5 hours to have any benefit. Interval history/Current status:     She is concerned that she was not on home meds in hospsital  These were reviewed and reconciled and restarted home meds    Lipitor 20 mg d,  pristiq 50 mg d  She is using NIV reuglarly at home  Reviewed inpatient labs and imaging  and meds   Discussed paln with patient and her   Patient Active Problem List   Diagnosis    Rheumatoid arthritis ([facility] [address]    Hyperlipidemia    Asthma    Tobacco use disorder    Major depressive disorder, single episode, mild (HCC)    Severe chronic obstructive pulmonary disease (HCC)    COPD exacerbation ([facility] [address]    Hypercapnia       Medications listed as ordered at the time of discharge from hospital     Medication List            Accurate as of February 15, 2023  9:45 AM. If you have any questions, ask your nurse or doctor.                 CONTINUE taking these medications      albuterol sulfate  (90 Base) MCG/ACT inhaler  Commonly known as: Ventolin HFA  Inhale 2 puffs into the lungs every 6 hours as needed for Wheezing     arformoterol tartrate 15 MCG/2ML Nebu  Commonly known as: BROVANA  Take 1 ampule by nebulization 2 times daily     atorvastatin 20 MG tablet  Commonly known as: LIPITOR     budesonide 0.5 MG/2ML nebulizer suspension  Commonly known as: PULMICORT     desvenlafaxine succinate 50 MG Tb24 extended release tablet  Commonly known as: PRISTIQ     ipratropium-albuterol 0.5-2.5 (3) MG/3ML Soln nebulizer solution  Commonly known as: DUONEB  Inhale 3 mLs into the lungs every 4 hours (while awake)     OXYGEN     predniSONE 10 MG tablet  Commonly known as: DELTASONE  Take 3 tablets by mouth daily for 5 days, THEN 2 tablets daily for 5 days, THEN 1 tablet daily for 5 days, THEN 0.5 tablets every other day for 5 days. Start taking on: February 13, 2023     sodium chloride 0.65 % nasal spray  Commonly known as: OCEAN, BABY AYR  1 spray by Nasal route every 2 hours as needed for Congestion                Medications marked \"taking\" at this time  No outpatient medications have been marked as taking for the 2/15/23 encounter (Telemedicine) with Desirae Black MD.        Medications patient taking as of now reconciled against medications ordered at time of hospital discharge: Yes    A comprehensive review of systems was negative except for what was noted in the HPI. Objective:    Patient-Reported Vitals  Patient-Reported Systolic (Top): 888 mmHg  Patient-Reported Diastolic (Bottom): 60 mmHg  Patient-Reported Pulse Oximetry: 92          Randol Pata, was evaluated through a synchronous (real-time) audio-video encounter. The patient (or guardian if applicable) is aware that this is a billable service, which includes applicable co-pays. This Virtual Visit was conducted with patient's (and/or legal guardian's) consent. The visit was conducted pursuant to the emergency declaration under the [facility], [address] [place] [address] authority and the [employer] General Act.   Patient identification was verified, and a caregiver was present when appropriate. The patient was located at Home: [address] [facility] [address] [facility]  Provider was located at [place] [address] ([address] [place] [address]): [address] [facility] [address],  [address] [facility]       An [address] [facility] was used to authenticate this note.   --Heavenly Sloan MD

## 2023-02-15 NOTE — TELEPHONE ENCOUNTER
Spoke with patient's . Will agree to virtual HFU. No TCM due to proximity of previous TCM capture date.

## 2023-02-22 ENCOUNTER — CARE COORDINATION (OUTPATIENT)
Dept: CASE MANAGEMENT | Age: 73
End: 2023-02-22

## 2023-02-22 NOTE — CARE COORDINATION
[facility] Care Transitions Follow Up Call    Patient Current Location: [address]  Transition Nurse contacted the family by telephone to follow up after admission on 23. Verified name and  with family as identifiers. Patient: Kavya Johnson  Patient : 1950   MRN: [id number]  Reason for Admission: Hypercapnia  Discharge Date: 23 RARS: Readmission Risk Score: 12.3      Needs to be reviewed by the provider   Additional needs identified to be addressed with provider: No  none             Method of communication with provider: none. Spoke with patient  (Kate Marks) on communication release regarding TCM/hospital discharge (low risk) follow up. Kate Marks states patient continues doing well and offers no complaints at this time. Denies patient having an shortness of breath, chest pain or chest discomfort. [facility] patient is weaning off home oxygen and using 1 lpm as needed. [facility] oxygen saturation is 93% on R/A. [facility] patient continuous wearing NIV at night as directed and using for approximately 7 hrs/night. [facility] patient completed HFU appt with pulmonology yesterday. Kate Marks admits patient continues on Prednisone therapy and is tapering as directed. [facility] patient completed HFU with PCP on 2/15/23 and denies any new meds or changes in meds. CTN reiterated COPD zone tool and knowing when to seek medical attention. Kate Marks denies any needs at this time. CTN will continue to follow for Care Transition. Addressed changes since last contact:   COmpleted both PCP and pulmonology follow up appts  Discussed follow-up appointments. If no appointment was previously scheduled, appointment scheduling offered: Yes. Is follow up appointment scheduled within 7 days of discharge? Yes.     Follow Up  Future Appointments   Date Time Provider Franco Heller   3/3/2023  2:30 PM Sylwia Moran MD [facility]   2023  2:30 PM Sylwia Moran MD [facility]   2023  3:15 PM CARLY Monterroso - NP [facility]   10/30/2023  2:30 PM Israel Mayes MD [address] [facility] [address] Transition Nurse reviewed discharge instructions, medical action plan, and red flags with patient and discussed any barriers to care and/or understanding of plan of care after discharge. Discussed appropriate site of care based on symptoms and resources available to patient including: PCP  Specialist  Urgent care clinics  Home health  When to call 911  Condition related references. The family agrees to contact the PCP office for questions related to their healthcare. Advance Care Planning:   reviewed and current. Patients top risk factors for readmission: medical condition-COPD, Asthma and polypharmacy    Offered patient enrollment in the Remote Patient Monitoring (RPM) program for in-home monitoring: Patient declined. Care Transitions Subsequent and Final Call    Subsequent and Final Calls  Do you have any ongoing symptoms?: No  Have your medications changed?: No  Do you have any questions related to your medications?: No  Do you currently have any active services?: Yes  Are you currently active with any services?: Home Health  Do you have any needs or concerns that I can assist you with?: No  Identified Barriers: None  Care Transitions Interventions  No Identified Needs  Other Interventions:             Care Transition Nurse provided contact information for future needs. Plan for follow-up call in 7-10 days based on severity of symptoms and risk factors.   Plan for next call:  Symptom check    CARLY Duque

## 2023-03-03 ENCOUNTER — CARE COORDINATION (OUTPATIENT)
Dept: CASE MANAGEMENT | Age: 73
End: 2023-03-03

## 2023-03-03 ENCOUNTER — TELEMEDICINE (OUTPATIENT)
Dept: FAMILY MEDICINE CLINIC | Age: 73
End: 2023-03-03

## 2023-03-03 DIAGNOSIS — J44.1 COPD EXACERBATION (HCC): Primary | ICD-10-CM

## 2023-03-03 DIAGNOSIS — J44.9 SEVERE CHRONIC OBSTRUCTIVE PULMONARY DISEASE (HCC): ICD-10-CM

## 2023-03-03 DIAGNOSIS — B37.0 THRUSH, ORAL: ICD-10-CM

## 2023-03-03 RX ORDER — FLUCONAZOLE 100 MG/1
TABLET ORAL
Qty: 14 TABLET | Refills: 0 | Status: SHIPPED | OUTPATIENT
Start: 2023-03-03

## 2023-03-03 RX ORDER — CELECOXIB 200 MG/1
200 CAPSULE ORAL 2 TIMES DAILY
COMMUNITY

## 2023-03-03 RX ORDER — SULFASALAZINE 500 MG/1
500 TABLET ORAL 2 TIMES DAILY
COMMUNITY

## 2023-03-03 ASSESSMENT — ENCOUNTER SYMPTOMS
EYES NEGATIVE: 1
WHEEZING: 0
COUGH: 1
ALLERGIC/IMMUNOLOGIC NEGATIVE: 1
GASTROINTESTINAL NEGATIVE: 1
CHEST TIGHTNESS: 0
SHORTNESS OF BREATH: 0

## 2023-03-03 NOTE — PROGRESS NOTES
TELEHEALTH VIDEO VISIT    Shawna Rodriguez, was evaluated through a synchronous (real-time) audio-video encounter. The patient (or guardian if applicable) is aware that this is a billable service. , which includes applicable co-pays. This virtual visit was conducted with the patient's  (and/or legal guardian's) consent. The visit was conducted pursuant to the emergency declaration under the [facility], [address] [place] [address] authority and the [employer] and [employer] General Act. Patient identification was verified, and a caregiver was present when appropriate. The patient was located in a state where the provider was licensed to provide care. Patient identification was verified at the start of the visit, including the patient's telephone number and physical location. I discussed with the patient the nature of our telehealth visits, that:     Due to the nature of an audio- video modality, the only components of a physical exam that could be done are the elements supported by direct observation. I would evaluate the patient and recommend diagnostics and treatments based on my assessment. If it was felt that the patient should be evaluated in clinic or an emergency room setting, then they would be directed there. Our sessions are not being recorded and that personal health information is protected. Our team would provide follow up care in person if/when the patient needs it.        Patient's location: home address in [facility]  Physician  location other address in [facility] other people involved in call  no one    HPI:    Shawna Rodriguez (:  1950) has requested an audio/video evaluation for the following concern(s):    Chief Complaint   Patient presents with    COPD     6 week follow up     Doing well her Oxygen is down to 1 L and not using it all the time  Trin to get a portable  unit  To follow with Dr Lamona Boas in 2 mos   States tongue is white from nebulizer steroids  Will call in [employer]ista for this    Other wise improving    Review of Systems   Constitutional:  Negative for activity change, appetite change, fatigue and unexpected weight change. HENT:  Negative for congestion. Eyes: Negative. Negative for visual disturbance. Respiratory:  Positive for cough. Negative for chest tightness, shortness of breath and wheezing. Decreasing sob, no wheezing   Cardiovascular:  Negative for chest pain and palpitations. Gastrointestinal: Negative. Endocrine: Negative. Genitourinary: Negative. Musculoskeletal: Negative. Skin:  Negative for pallor, rash and wound. Allergic/Immunologic: Negative. Neurological: Negative. Negative for syncope. Hematological: Negative. Psychiatric/Behavioral: Negative. Prior to Visit Medications    Medication Sig Taking? Authorizing Provider   celecoxib (CELEBREX) 200 MG capsule Take 200 mg by mouth 2 times daily Yes Historical Provider, MD   sulfaSALAzine (AZULFIDINE) 500 MG tablet Take 500 mg by mouth 2 times daily Yes Historical Provider, MD   fluconazole (DIFLUCAN) 100 MG tablet 1 tab bid then 1 tab daily x 13 days Yes Kym Brooks MD   sodium chloride (OCEAN, BABY AYR) 0.65 % nasal spray 1 spray by Nasal route every 2 hours as needed for Congestion Yes Creasie Rinne, MD   predniSONE (DELTASONE) 10 MG tablet Take 3 tablets by mouth daily for 5 days, THEN 2 tablets daily for 5 days, THEN 1 tablet daily for 5 days, THEN 0.5 tablets every other day for 5 days.  Yes Creasie Rinne, MD   budesonide (PULMICORT) 0.5 MG/2ML nebulizer suspension Take 1 ampule by nebulization 2 times daily Yes Historical Provider, MD   atorvastatin (LIPITOR) 20 MG tablet Take 20 mg by mouth every morning Yes Historical Provider, MD   desvenlafaxine succinate (PRISTIQ) 50 MG TB24 extended release tablet Take 50 mg by mouth nightly Yes Historical Provider, MD   OXYGEN Inhale 1 L/min into the lungs continuous Yes Historical Provider, MD   arformoterol tartrate (BROVANA) 15 MCG/2ML NEBU Take 1 ampule by nebulization 2 times daily Yes CARLY Ace NP   ipratropium-albuterol (DUONEB) 0.5-2.5 (3) MG/3ML SOLN nebulizer solution Inhale 3 mLs into the lungs every 4 hours (while awake) Yes CARLY Ace NP   albuterol sulfate HFA (VENTOLIN HFA) 108 (90 Base) MCG/ACT inhaler Inhale 2 puffs into the lungs every 6 hours as needed for Wheezing Yes Ember Jimenez MD       Social History     Tobacco Use    Smoking status: Every Day     Packs/day: 1.00     Years: 57.00     Pack years: 57.00     Types: Cigarettes     Start date: 1965    Smokeless tobacco: Current   Vaping Use    Vaping Use: Every day   Substance Use Topics    Alcohol use: Yes     Comment: occasional    Drug use: Never        Allergies   Allergen Reactions    Penicillins Anaphylaxis       PHYSICAL EXAMINATION:  [ INSTRUCTIONS:  \"[x]\" Indicates a positive item  \"[]\" Indicates a negative item  -- DELETE ALL ITEMS NOT EXAMINED]  Vital Signs: (As obtained by patient/caregiver or practitioner observation)    Blood pressure-  Heart rate-    Respiratory rate-    Temperature-  Pulse oximetry-     Constitutional: [x] Appears well-developed and well-nourished [x] No apparent distress      [] Abnormal-   Mental status  [x] Alert and awake  [x] Oriented to person/place/time [x]Able to follow commands      Eyes:  EOM    []  Normal  [] Abnormal-  Sclera  []  Normal  [] Abnormal -         Discharge []  None visible  [] Abnormal -    HENT:   [] Normocephalic, atraumatic.   [] Abnormal   [] Mouth/Throat: Mucous membranes are moist.     External Ears [] Normal  [] Abnormal-     Neck: [] No visualized mass     Pulmonary/Chest: [x] Respiratory effort normal.  [x] No visualized signs of difficulty breathing or respiratory distress        [] Abnormal-      Musculoskeletal:   [] Normal gait with no signs of ataxia         [] Normal range of motion of neck        [] Abnormal- Neurological:        [] No Facial Asymmetry (Cranial nerve 7 motor function) (limited exam to video visit)          [] No gaze palsy        [] Abnormal-         Skin:        [] No significant exanthematous lesions or discoloration noted on facial skin         [] Abnormal-            Psychiatric:       [] Normal Affect [] No Hallucinations        [] Abnormal-     Other pertinent observable physical exam findings-     ASSESSMENT/PLAN:  1. COPD exacerbation (HCC)  Continue on tapering dose of prednison, duoneb, albuterol prn brovana, nasal saline, pulmicort  Follow with DR patricia DENNIS per pulm  Karin, cont meds recheck 6 mos      2. Severe chronic obstructive pulmonary disease ([facility] [address]  See above    3. Thrush, oral  Tongue visiably white coated  Rinse mouth after nebulizer  - fluconazole (DIFLUCAN) 100 MG tablet; 1 tab bid then 1 tab daily x 13 days  Dispense: 14 tablet; Refill: 0    Return in about 3 months (around 6/3/2023). ,     This visit was completed virtually using [employer]. me        --Kody Camejo MD on 3/3/2023 at 8:09 PM    An electronic signature was used to authenticate this note.

## 2023-03-03 NOTE — CARE COORDINATION
[facility] Care Transitions Follow Up Call    Patient Current Location:  Home: [place] [id number]    Care Transition Nurse contacted the patient by telephone to follow up after admission on 23. Verified name and  with patient as identifiers. Patient: Valorie Cranker  Patient : 1950   MRN: [id number]  Reason for Admission: Hypercapnia  Discharge Date: 23 RARS: Readmission Risk Score: 12.3      Needs to be reviewed by the provider   Additional needs identified to be addressed with provider: No  none             Method of communication with provider: none. Spoke with patient today 3/3/23 for final TCM/hospital discharge (low risk) follow up. Patient states she continues doing well and only using home oxygen at 1 lpm with her NIV at night and 1 lpm during day when doing pulmonary rehab exercises. Patient denies any shortness of breath, chest pain or chest discomfort. States she spoke with [employer] today regarding obtaining portable oxygen and was advised they have all required paperwork and waiting on rep. Patient voices frustration with having to wait for portable tank. Reiterated COPD zone tool and knowing when to seek medical attention. Confirmed patient completed both PCP and pulmonology follow up appts. Patient states she is expected to be [facility]. Denies any other complaints or concerns. CTN signing off for Care Transition. Addressed changes since last contact:  none  Discussed follow-up appointments. If no appointment was previously scheduled, appointment scheduling offered: Yes. Is follow up appointment scheduled within 7 days of discharge? Yes.     Follow Up  Future Appointments   Date Time Provider Franco Heller   2023  2:30 PM Obed Newman MD [facility]   2023  3:15 PM CARLY Gamez NP [facility]   10/30/2023  2:30 PM Obed Newman MD [place] [address] Transition Nurse reviewed discharge instructions, medical action plan, and red flags with patient and discussed any barriers to care and/or understanding of plan of care after discharge. Discussed appropriate site of care based on symptoms and resources available to patient including: PCP  Specialist  Home health  When to call 911  Condition related references. The patient agrees to contact the PCP office for questions related to their healthcare. Advance Care Planning:   reviewed and current. Patients top risk factors for readmission: medical condition-COPD, Asthma and polypharmacy    Care Transitions Subsequent and Final Call    Subsequent and Final Calls  Do you have any ongoing symptoms?: No  Have your medications changed?: No  Do you have any questions related to your medications?: No  Do you currently have any active services?: Yes  Are you currently active with any services?: Home Health  Do you have any needs or concerns that I can assist you with?: No  Identified Barriers: None  Care Transitions Interventions  No Identified Needs  Other Interventions:             Care Transition Nurse provided contact information for future needs. No further follow up:  Plan for next call:  CTN signing off for Care Transition.      CARLY Gonsalves

## 2023-04-14 ENCOUNTER — OFFICE VISIT (OUTPATIENT)
Dept: FAMILY MEDICINE CLINIC | Age: 73
End: 2023-04-14
Payer: MEDICARE

## 2023-04-14 VITALS
OXYGEN SATURATION: 93 % | SYSTOLIC BLOOD PRESSURE: 138 MMHG | HEART RATE: 107 BPM | BODY MASS INDEX: 39.32 KG/M2 | WEIGHT: 215 LBS | DIASTOLIC BLOOD PRESSURE: 80 MMHG | TEMPERATURE: 97.7 F | RESPIRATION RATE: 24 BRPM

## 2023-04-14 DIAGNOSIS — J44.9 SEVERE CHRONIC OBSTRUCTIVE PULMONARY DISEASE (HCC): Primary | ICD-10-CM

## 2023-04-14 DIAGNOSIS — R60.0 PEDAL EDEMA: ICD-10-CM

## 2023-04-14 DIAGNOSIS — J44.1 COPD EXACERBATION (HCC): ICD-10-CM

## 2023-04-14 DIAGNOSIS — F32.0 MAJOR DEPRESSIVE DISORDER, SINGLE EPISODE, MILD (HCC): ICD-10-CM

## 2023-04-14 DIAGNOSIS — M05.79 RHEUMATOID ARTHRITIS INVOLVING MULTIPLE SITES WITH POSITIVE RHEUMATOID FACTOR (HCC): ICD-10-CM

## 2023-04-14 PROCEDURE — 99213 OFFICE O/P EST LOW 20 MIN: CPT | Performed by: FAMILY MEDICINE

## 2023-04-14 PROCEDURE — G8417 CALC BMI ABV UP PARAM F/U: HCPCS | Performed by: FAMILY MEDICINE

## 2023-04-14 PROCEDURE — 1123F ACP DISCUSS/DSCN MKR DOCD: CPT | Performed by: FAMILY MEDICINE

## 2023-04-14 PROCEDURE — 3017F COLORECTAL CA SCREEN DOC REV: CPT | Performed by: FAMILY MEDICINE

## 2023-04-14 PROCEDURE — 4004F PT TOBACCO SCREEN RCVD TLK: CPT | Performed by: FAMILY MEDICINE

## 2023-04-14 PROCEDURE — G8400 PT W/DXA NO RESULTS DOC: HCPCS | Performed by: FAMILY MEDICINE

## 2023-04-14 PROCEDURE — G8427 DOCREV CUR MEDS BY ELIG CLIN: HCPCS | Performed by: FAMILY MEDICINE

## 2023-04-14 PROCEDURE — 1090F PRES/ABSN URINE INCON ASSESS: CPT | Performed by: FAMILY MEDICINE

## 2023-04-14 PROCEDURE — 3023F SPIROM DOC REV: CPT | Performed by: FAMILY MEDICINE

## 2023-04-14 RX ORDER — HYDROCHLOROTHIAZIDE 12.5 MG/1
12.5 CAPSULE, GELATIN COATED ORAL EVERY MORNING
Qty: 90 CAPSULE | Refills: 1 | Status: SHIPPED | OUTPATIENT
Start: 2023-04-14

## 2023-04-20 ASSESSMENT — ENCOUNTER SYMPTOMS
WHEEZING: 0
CHEST TIGHTNESS: 0
GASTROINTESTINAL NEGATIVE: 1
SHORTNESS OF BREATH: 0
ALLERGIC/IMMUNOLOGIC NEGATIVE: 1
EYES NEGATIVE: 1
COUGH: 0

## 2023-04-28 ENCOUNTER — TELEPHONE (OUTPATIENT)
Dept: FAMILY MEDICINE CLINIC | Age: 73
End: 2023-04-28

## 2023-05-15 DIAGNOSIS — F32.0 MAJOR DEPRESSIVE DISORDER, SINGLE EPISODE, MILD (HCC): Primary | ICD-10-CM

## 2023-05-15 RX ORDER — DESVENLAFAXINE SUCCINATE 50 MG/1
50 TABLET, EXTENDED RELEASE ORAL NIGHTLY
Qty: 30 TABLET | Refills: 0 | Status: SHIPPED | OUTPATIENT
Start: 2023-05-15

## 2023-05-15 NOTE — TELEPHONE ENCOUNTER
Medication Refill Request    LOV 4/14/2023  NOV 8/16/2023    Lab Results   Component Value Date    CREATININE 1.0 04/14/2023     The patient messaged requesting a refill.

## 2023-06-23 DIAGNOSIS — F32.0 MAJOR DEPRESSIVE DISORDER, SINGLE EPISODE, MILD (HCC): ICD-10-CM

## 2023-06-23 RX ORDER — DESVENLAFAXINE SUCCINATE 50 MG/1
50 TABLET, EXTENDED RELEASE ORAL NIGHTLY
Qty: 30 TABLET | Refills: 0 | Status: SHIPPED | OUTPATIENT
Start: 2023-06-23

## 2023-06-23 NOTE — TELEPHONE ENCOUNTER
Medication Refill Request    LOV 4/14/2023  NOV 8/16/2023    Lab Results   Component Value Date    CREATININE 1.0 04/14/2023     Carmen Perez called and left a message.

## 2023-06-26 ENCOUNTER — HOSPITAL ENCOUNTER (OUTPATIENT)
Dept: PULMONOLOGY | Age: 73
Discharge: HOME | End: 2023-06-26
Payer: MEDICARE

## 2023-06-26 DIAGNOSIS — J44.9 CHRONIC OBSTRUCTIVE PULMONARY DISEASE, UNSPECIFIED COPD TYPE (HCC): ICD-10-CM

## 2023-06-26 PROCEDURE — 94726 PLETHYSMOGRAPHY LUNG VOLUMES: CPT

## 2023-06-26 PROCEDURE — 94060 EVALUATION OF WHEEZING: CPT

## 2023-06-26 PROCEDURE — 94729 DIFFUSING CAPACITY: CPT

## 2023-07-16 DIAGNOSIS — E78.5 HYPERLIPIDEMIA, UNSPECIFIED: ICD-10-CM

## 2023-07-17 RX ORDER — ATORVASTATIN CALCIUM 20 MG/1
TABLET, FILM COATED ORAL
Qty: 90 TABLET | Refills: 1 | Status: SHIPPED | OUTPATIENT
Start: 2023-07-17

## 2023-07-17 NOTE — TELEPHONE ENCOUNTER
Medication Refill Request    LOV 4/14/2023  NOV 8/16/2023    Lab Results   Component Value Date    CREATININE 1.0 04/14/2023

## 2023-08-08 DIAGNOSIS — F32.0 MAJOR DEPRESSIVE DISORDER, SINGLE EPISODE, MILD (HCC): ICD-10-CM

## 2023-08-08 RX ORDER — DESVENLAFAXINE SUCCINATE 50 MG/1
TABLET, EXTENDED RELEASE ORAL
Qty: 30 TABLET | Refills: 0 | Status: SHIPPED | OUTPATIENT
Start: 2023-08-08

## 2023-08-13 SDOH — ECONOMIC STABILITY: FOOD INSECURITY: WITHIN THE PAST 12 MONTHS, THE FOOD YOU BOUGHT JUST DIDN'T LAST AND YOU DIDN'T HAVE MONEY TO GET MORE.: PATIENT DECLINED

## 2023-08-13 SDOH — ECONOMIC STABILITY: HOUSING INSECURITY
IN THE LAST 12 MONTHS, WAS THERE A TIME WHEN YOU DID NOT HAVE A STEADY PLACE TO SLEEP OR SLEPT IN A SHELTER (INCLUDING NOW)?: PATIENT REFUSED

## 2023-08-13 SDOH — ECONOMIC STABILITY: INCOME INSECURITY: HOW HARD IS IT FOR YOU TO PAY FOR THE VERY BASICS LIKE FOOD, HOUSING, MEDICAL CARE, AND HEATING?: PATIENT DECLINED

## 2023-08-13 SDOH — ECONOMIC STABILITY: TRANSPORTATION INSECURITY
IN THE PAST 12 MONTHS, HAS LACK OF TRANSPORTATION KEPT YOU FROM MEETINGS, WORK, OR FROM GETTING THINGS NEEDED FOR DAILY LIVING?: PATIENT DECLINED

## 2023-08-13 SDOH — ECONOMIC STABILITY: FOOD INSECURITY: WITHIN THE PAST 12 MONTHS, YOU WORRIED THAT YOUR FOOD WOULD RUN OUT BEFORE YOU GOT MONEY TO BUY MORE.: PATIENT DECLINED

## 2023-08-16 ENCOUNTER — OFFICE VISIT (OUTPATIENT)
Dept: FAMILY MEDICINE CLINIC | Age: 73
End: 2023-08-16

## 2023-08-16 VITALS
OXYGEN SATURATION: 94 % | RESPIRATION RATE: 16 BRPM | TEMPERATURE: 97 F | DIASTOLIC BLOOD PRESSURE: 68 MMHG | BODY MASS INDEX: 40.38 KG/M2 | WEIGHT: 220.8 LBS | HEART RATE: 94 BPM | SYSTOLIC BLOOD PRESSURE: 132 MMHG

## 2023-08-16 DIAGNOSIS — M05.79 RHEUMATOID ARTHRITIS INVOLVING MULTIPLE SITES WITH POSITIVE RHEUMATOID FACTOR (HCC): ICD-10-CM

## 2023-08-16 DIAGNOSIS — R06.89 HYPERCAPNIA: ICD-10-CM

## 2023-08-16 DIAGNOSIS — E78.00 PURE HYPERCHOLESTEROLEMIA: Primary | ICD-10-CM

## 2023-08-16 DIAGNOSIS — J44.9 SEVERE CHRONIC OBSTRUCTIVE PULMONARY DISEASE (HCC): ICD-10-CM

## 2023-08-16 NOTE — PROGRESS NOTES
2023    Current Outpatient Medications   Medication Sig Dispense Refill    desvenlafaxine succinate (PRISTIQ) 50 MG TB24 extended release tablet TAKE ONE TABLET BY MOUTH NIGHTLY 30 tablet 0    atorvastatin (LIPITOR) 20 MG tablet TAKE ONE TABLET BY MOUTH ONCE DAILY 90 tablet 1    budesonide (PULMICORT) 0.5 MG/2ML nebulizer suspension USE 1 vial via nebulizer TWICE DAILY 120 mL 3    hydroCHLOROthiazide (MICROZIDE) 12.5 MG capsule Take 1 capsule by mouth every morning (Patient taking differently: Take 2 capsules by mouth every morning) 90 capsule 1    celecoxib (CELEBREX) 200 MG capsule Take 1 capsule by mouth 2 times daily PRN      sulfaSALAzine (AZULFIDINE) 500 MG tablet Take 1 tablet by mouth 2 times daily      sodium chloride (OCEAN, BABY AYR) 0.65 % nasal spray 1 spray by Nasal route every 2 hours as needed for Congestion  0    OXYGEN Inhale 1 L/min into the lungs continuous      albuterol sulfate HFA (VENTOLIN HFA) 108 (90 Base) MCG/ACT inhaler Inhale 2 puffs into the lungs every 6 hours as needed for Wheezing 18 g 5    arformoterol tartrate (BROVANA) 15 MCG/2ML NEBU Inhale the contents of 1 vial via nebulizer TWICE DAILY (Patient not taking: Reported on 2023) 120 mL 3     No current facility-administered medications for this visit.          Dimas Anguiano (: 1950 IS A 67 y.o. female ,Established patient, here for eval of COPD (6 month check up), Depression, and Hyperlipidemia    Deit protein fruits and vegetables  Water    Semaglutide/ levocarnitine going to a weight loss spa run by NP's    She is here with her O2 on    in room  In general doing ok    Wt Readings from Last 3 Encounters:   23 220 lb 12.8 oz (100.2 kg)   23 215 lb (97.5 kg)   23 215 lb (97.5 kg)       ASSESSMENT / PLAN            2. Pure hypercholesterolemia  The 10-year ASCVD risk score (Billy RAMIREZ, et al., 2019) is: 12.3%    Values used to calculate the score:      Age: 67 years      Sex: Female

## 2023-08-20 PROBLEM — J44.1 COPD EXACERBATION (HCC): Status: RESOLVED | Noted: 2023-01-14 | Resolved: 2023-08-20

## 2023-08-20 ASSESSMENT — ENCOUNTER SYMPTOMS
WHEEZING: 0
SHORTNESS OF BREATH: 1
EYES NEGATIVE: 1
CHEST TIGHTNESS: 0
COUGH: 0
GASTROINTESTINAL NEGATIVE: 1
ALLERGIC/IMMUNOLOGIC NEGATIVE: 1

## 2023-09-05 DIAGNOSIS — F32.0 MAJOR DEPRESSIVE DISORDER, SINGLE EPISODE, MILD (HCC): ICD-10-CM

## 2023-09-05 RX ORDER — DESVENLAFAXINE SUCCINATE 50 MG/1
TABLET, EXTENDED RELEASE ORAL
Qty: 30 TABLET | Refills: 0 | Status: SHIPPED | OUTPATIENT
Start: 2023-09-05

## 2023-09-05 NOTE — TELEPHONE ENCOUNTER
Medication Refill Request    LOV 8/16/2023  NOV 10/30/2023    Lab Results   Component Value Date    CREATININE 1.0 04/14/2023

## 2023-09-19 DIAGNOSIS — M05.79 RHEUMATOID ARTHRITIS INVOLVING MULTIPLE SITES WITH POSITIVE RHEUMATOID FACTOR (HCC): ICD-10-CM

## 2023-09-19 DIAGNOSIS — E78.00 PURE HYPERCHOLESTEROLEMIA: ICD-10-CM

## 2023-09-19 LAB
ABSOLUTE IMMATURE GRANULOCYTE: 0.03 K/UL (ref 0–0.58)
ALBUMIN SERPL-MCNC: 4.4 G/DL (ref 3.5–5.2)
ALP BLD-CCNC: 82 U/L (ref 35–104)
ALT SERPL-CCNC: 50 U/L (ref 0–32)
ANION GAP SERPL CALCULATED.3IONS-SCNC: 13 MMOL/L (ref 7–16)
AST SERPL-CCNC: 33 U/L (ref 0–31)
BASOPHILS ABSOLUTE: 0.04 K/UL (ref 0–0.2)
BASOPHILS RELATIVE PERCENT: 1 % (ref 0–2)
BILIRUB SERPL-MCNC: 0.4 MG/DL (ref 0–1.2)
BUN BLDV-MCNC: 26 MG/DL (ref 6–23)
CALCIUM SERPL-MCNC: 9.4 MG/DL (ref 8.6–10.2)
CHLORIDE BLD-SCNC: 100 MMOL/L (ref 98–107)
CHOLESTEROL: 188 MG/DL
CO2: 27 MMOL/L (ref 22–29)
CREAT SERPL-MCNC: 1.1 MG/DL (ref 0.5–1)
EOSINOPHILS ABSOLUTE: 0.19 K/UL (ref 0.05–0.5)
EOSINOPHILS RELATIVE PERCENT: 3 % (ref 0–6)
GFR SERPL CREATININE-BSD FRML MDRD: 53 ML/MIN/1.73M2
GLUCOSE BLD-MCNC: 87 MG/DL (ref 74–99)
HCT VFR BLD CALC: 40.4 % (ref 34–48)
HDLC SERPL-MCNC: 61 MG/DL
HEMOGLOBIN: 12.6 G/DL (ref 11.5–15.5)
IMMATURE GRANULOCYTES: 0 % (ref 0–5)
LDL CHOLESTEROL: 111 MG/DL
LYMPHOCYTES ABSOLUTE: 2.22 K/UL (ref 1.5–4)
LYMPHOCYTES RELATIVE PERCENT: 29 % (ref 20–42)
MCH RBC QN AUTO: 29.3 PG (ref 26–35)
MCHC RBC AUTO-ENTMCNC: 31.2 G/DL (ref 32–34.5)
MCV RBC AUTO: 94 FL (ref 80–99.9)
MONOCYTES ABSOLUTE: 0.64 K/UL (ref 0.1–0.95)
MONOCYTES RELATIVE PERCENT: 8 % (ref 2–12)
NEUTROPHILS ABSOLUTE: 4.57 K/UL (ref 1.8–7.3)
NEUTROPHILS RELATIVE PERCENT: 59 % (ref 43–80)
PDW BLD-RTO: 13.7 % (ref 11.5–15)
PLATELET # BLD: 285 K/UL (ref 130–450)
PMV BLD AUTO: 10.8 FL (ref 7–12)
POTASSIUM SERPL-SCNC: 3.9 MMOL/L (ref 3.5–5)
RBC # BLD: 4.3 M/UL (ref 3.5–5.5)
SEDIMENTATION RATE, ERYTHROCYTE: 39 MM/HR (ref 0–20)
SODIUM BLD-SCNC: 140 MMOL/L (ref 132–146)
TOTAL PROTEIN: 7.5 G/DL (ref 6.4–8.3)
TRIGL SERPL-MCNC: 82 MG/DL
VLDLC SERPL CALC-MCNC: 16 MG/DL
WBC # BLD: 7.7 K/UL (ref 4.5–11.5)

## 2023-09-20 LAB — C-REACTIVE PROTEIN: 4 MG/L (ref 0–5)

## 2023-10-02 RX ORDER — HYDROCHLOROTHIAZIDE 25 MG/1
25 TABLET ORAL DAILY
Qty: 90 TABLET | Refills: 1 | OUTPATIENT
Start: 2023-10-02

## 2023-10-07 DIAGNOSIS — F32.0 MAJOR DEPRESSIVE DISORDER, SINGLE EPISODE, MILD (HCC): ICD-10-CM

## 2023-10-09 RX ORDER — HYDROCHLOROTHIAZIDE 25 MG/1
25 TABLET ORAL DAILY
Qty: 90 TABLET | Refills: 1 | OUTPATIENT
Start: 2023-10-09

## 2023-10-09 RX ORDER — DESVENLAFAXINE SUCCINATE 50 MG/1
TABLET, EXTENDED RELEASE ORAL
Qty: 30 TABLET | Refills: 1 | Status: SHIPPED | OUTPATIENT
Start: 2023-10-09

## 2023-10-09 NOTE — TELEPHONE ENCOUNTER
Medication Refill Request    LOV 8/16/2023  NOV 10/30/2023    Lab Results   Component Value Date    CREATININE 1.1 (H) 09/19/2023

## 2023-10-11 RX ORDER — HYDROCHLOROTHIAZIDE 25 MG/1
25 TABLET ORAL DAILY
Qty: 90 TABLET | Refills: 1 | Status: SHIPPED | OUTPATIENT
Start: 2023-10-11

## 2023-10-11 NOTE — TELEPHONE ENCOUNTER
Medication Refill Request    LOV 8/16/2023  NOV 10/30/2023    Lab Results   Component Value Date    CREATININE 1.1 (H) 09/19/2023
19.88

## 2023-10-27 SDOH — HEALTH STABILITY: PHYSICAL HEALTH: ON AVERAGE, HOW MANY DAYS PER WEEK DO YOU ENGAGE IN MODERATE TO STRENUOUS EXERCISE (LIKE A BRISK WALK)?: 7 DAYS

## 2023-10-27 SDOH — HEALTH STABILITY: PHYSICAL HEALTH: ON AVERAGE, HOW MANY MINUTES DO YOU ENGAGE IN EXERCISE AT THIS LEVEL?: 30 MIN

## 2023-10-27 ASSESSMENT — LIFESTYLE VARIABLES
HOW OFTEN DO YOU HAVE A DRINK CONTAINING ALCOHOL: 3
HOW OFTEN DO YOU HAVE SIX OR MORE DRINKS ON ONE OCCASION: 1
HOW OFTEN DO YOU HAVE A DRINK CONTAINING ALCOHOL: 2-4 TIMES A MONTH
HOW MANY STANDARD DRINKS CONTAINING ALCOHOL DO YOU HAVE ON A TYPICAL DAY: 1 OR 2
HOW MANY STANDARD DRINKS CONTAINING ALCOHOL DO YOU HAVE ON A TYPICAL DAY: 1

## 2023-10-27 ASSESSMENT — PATIENT HEALTH QUESTIONNAIRE - PHQ9
SUM OF ALL RESPONSES TO PHQ QUESTIONS 1-9: 0
1. LITTLE INTEREST OR PLEASURE IN DOING THINGS: 0
SUM OF ALL RESPONSES TO PHQ9 QUESTIONS 1 & 2: 0
SUM OF ALL RESPONSES TO PHQ QUESTIONS 1-9: 0
2. FEELING DOWN, DEPRESSED OR HOPELESS: 0
SUM OF ALL RESPONSES TO PHQ QUESTIONS 1-9: 0
SUM OF ALL RESPONSES TO PHQ QUESTIONS 1-9: 0

## 2023-10-30 ENCOUNTER — OFFICE VISIT (OUTPATIENT)
Dept: FAMILY MEDICINE CLINIC | Age: 73
End: 2023-10-30
Payer: MEDICARE

## 2023-10-30 VITALS
HEART RATE: 107 BPM | RESPIRATION RATE: 16 BRPM | OXYGEN SATURATION: 92 % | SYSTOLIC BLOOD PRESSURE: 128 MMHG | HEIGHT: 61 IN | WEIGHT: 215.2 LBS | BODY MASS INDEX: 40.63 KG/M2 | DIASTOLIC BLOOD PRESSURE: 64 MMHG | TEMPERATURE: 96.9 F

## 2023-10-30 DIAGNOSIS — R60.0 PEDAL EDEMA: ICD-10-CM

## 2023-10-30 DIAGNOSIS — Z00.00 MEDICARE ANNUAL WELLNESS VISIT, SUBSEQUENT: ICD-10-CM

## 2023-10-30 DIAGNOSIS — E78.00 PURE HYPERCHOLESTEROLEMIA: ICD-10-CM

## 2023-10-30 DIAGNOSIS — J44.9 SEVERE CHRONIC OBSTRUCTIVE PULMONARY DISEASE (HCC): ICD-10-CM

## 2023-10-30 DIAGNOSIS — R06.89 HYPERCAPNIA: ICD-10-CM

## 2023-10-30 DIAGNOSIS — M05.79 RHEUMATOID ARTHRITIS INVOLVING MULTIPLE SITES WITH POSITIVE RHEUMATOID FACTOR (HCC): ICD-10-CM

## 2023-10-30 DIAGNOSIS — Z23 NEED FOR INFLUENZA VACCINATION: Primary | ICD-10-CM

## 2023-10-30 PROCEDURE — G0439 PPPS, SUBSEQ VISIT: HCPCS | Performed by: FAMILY MEDICINE

## 2023-10-30 PROCEDURE — 90694 VACC AIIV4 NO PRSRV 0.5ML IM: CPT | Performed by: FAMILY MEDICINE

## 2023-10-30 PROCEDURE — 3017F COLORECTAL CA SCREEN DOC REV: CPT | Performed by: FAMILY MEDICINE

## 2023-10-30 PROCEDURE — 1123F ACP DISCUSS/DSCN MKR DOCD: CPT | Performed by: FAMILY MEDICINE

## 2023-10-30 PROCEDURE — G8484 FLU IMMUNIZE NO ADMIN: HCPCS | Performed by: FAMILY MEDICINE

## 2023-10-30 PROCEDURE — G0008 ADMIN INFLUENZA VIRUS VAC: HCPCS | Performed by: FAMILY MEDICINE

## 2023-10-30 RX ORDER — HYDROCHLOROTHIAZIDE 25 MG/1
25 TABLET ORAL EVERY MORNING
Qty: 90 TABLET | Refills: 1 | Status: SHIPPED | OUTPATIENT
Start: 2023-10-30

## 2023-10-30 ASSESSMENT — PATIENT HEALTH QUESTIONNAIRE - PHQ9
4. FEELING TIRED OR HAVING LITTLE ENERGY: 0
6. FEELING BAD ABOUT YOURSELF - OR THAT YOU ARE A FAILURE OR HAVE LET YOURSELF OR YOUR FAMILY DOWN: 0
3. TROUBLE FALLING OR STAYING ASLEEP: 0
2. FEELING DOWN, DEPRESSED OR HOPELESS: 0
SUM OF ALL RESPONSES TO PHQ QUESTIONS 1-9: 1
SUM OF ALL RESPONSES TO PHQ9 QUESTIONS 1 & 2: 0
9. THOUGHTS THAT YOU WOULD BE BETTER OFF DEAD, OR OF HURTING YOURSELF: 0
SUM OF ALL RESPONSES TO PHQ QUESTIONS 1-9: 1
8. MOVING OR SPEAKING SO SLOWLY THAT OTHER PEOPLE COULD HAVE NOTICED. OR THE OPPOSITE, BEING SO FIGETY OR RESTLESS THAT YOU HAVE BEEN MOVING AROUND A LOT MORE THAN USUAL: 0
7. TROUBLE CONCENTRATING ON THINGS, SUCH AS READING THE NEWSPAPER OR WATCHING TELEVISION: 1
SUM OF ALL RESPONSES TO PHQ QUESTIONS 1-9: 1
SUM OF ALL RESPONSES TO PHQ QUESTIONS 1-9: 1
5. POOR APPETITE OR OVEREATING: 0
1. LITTLE INTEREST OR PLEASURE IN DOING THINGS: 0
10. IF YOU CHECKED OFF ANY PROBLEMS, HOW DIFFICULT HAVE THESE PROBLEMS MADE IT FOR YOU TO DO YOUR WORK, TAKE CARE OF THINGS AT HOME, OR GET ALONG WITH OTHER PEOPLE: 0

## 2023-10-30 ASSESSMENT — COLUMBIA-SUICIDE SEVERITY RATING SCALE - C-SSRS
5. HAVE YOU STARTED TO WORK OUT OR WORKED OUT THE DETAILS OF HOW TO KILL YOURSELF? DO YOU INTEND TO CARRY OUT THIS PLAN?: NO
3. HAVE YOU BEEN THINKING ABOUT HOW YOU MIGHT KILL YOURSELF?: NO
7. DID THIS OCCUR IN THE LAST THREE MONTHS: NO
4. HAVE YOU HAD THESE THOUGHTS AND HAD SOME INTENTION OF ACTING ON THEM?: NO

## 2023-10-30 NOTE — PROGRESS NOTES
Medicare Annual Wellness Visit    Charmaine Stokes is here for Medicare AWV, COPD (6 month hceck up), Asthma, Hyperlipidemia, and Depression    Assessment & Plan   Need for influenza vaccination  -     Influenza, FLUAD, (age 72 y+), IM, Preservative Free, 0.5 mL  Medicare annual wellness visit, subsequent  Hypercapnia  -     CBC with Auto Differential; Future  -     Comprehensive Metabolic Panel; Future  Pure hypercholesterolemia  -     Lipid Panel; Future  Severe chronic obstructive pulmonary disease (HCC)  Rheumatoid arthritis involving multiple sites with positive rheumatoid factor (HCC)  Pedal edema  -     hydroCHLOROthiazide (HYDRODIURIL) 25 MG tablet; Take 1 tablet by mouth every morning, Disp-90 tablet, R-1Normal    Recommendations for Preventive Services Due: see orders and patient instructions/AVS.  Recommended screening schedule for the next 5-10 years is provided to the patient in written form: see Patient Instructions/AVS.     Return in 6 months (on 4/30/2024) for Medicare Annual Wellness Visit in 1 year. Subjective   The following acute and/or chronic problems were also addressed today:  Doing well with her O2  Still requiring at night  She is trying to go without O2 and has her tank with her today but not using it right now  Follows with Dr Treva Johnson    The 10-year ASCVD risk score (Billy DK, et al., 2019) is: 12.8%    Values used to calculate the score:      Age: 68 years      Sex: Female      Is Non- : No      Diabetic: No      Tobacco smoker: No      Systolic Blood Pressure: 248 mmHg      Is BP treated: No      HDL Cholesterol: 61 mg/dL      Total Cholesterol: 188 mg/dL  On lipitor    Patient's complete Health Risk Assessment and screening values have been reviewed and are found in Flowsheets. The following problems were reviewed today and where indicated follow up appointments were made and/or referrals ordered.     Positive Risk Factor Screenings with Interventions:

## 2023-11-29 DIAGNOSIS — F32.0 MAJOR DEPRESSIVE DISORDER, SINGLE EPISODE, MILD (HCC): ICD-10-CM

## 2023-11-29 RX ORDER — DESVENLAFAXINE SUCCINATE 50 MG/1
TABLET, EXTENDED RELEASE ORAL
Qty: 30 TABLET | Refills: 1 | Status: SHIPPED | OUTPATIENT
Start: 2023-11-29

## 2023-11-29 NOTE — TELEPHONE ENCOUNTER
Medication Refill Request    LOV 10/30/2023  NOV 5/1/2024    Lab Results   Component Value Date    CREATININE 1.1 (H) 09/19/2023

## 2024-01-15 DIAGNOSIS — E78.5 HYPERLIPIDEMIA, UNSPECIFIED: ICD-10-CM

## 2024-01-15 RX ORDER — ATORVASTATIN CALCIUM 20 MG/1
TABLET, FILM COATED ORAL
Qty: 90 TABLET | Refills: 1 | Status: SHIPPED | OUTPATIENT
Start: 2024-01-15

## 2024-01-25 DIAGNOSIS — F32.0 MAJOR DEPRESSIVE DISORDER, SINGLE EPISODE, MILD (HCC): ICD-10-CM

## 2024-01-25 RX ORDER — DESVENLAFAXINE SUCCINATE 50 MG/1
TABLET, EXTENDED RELEASE ORAL
Qty: 30 TABLET | Refills: 1 | Status: SHIPPED | OUTPATIENT
Start: 2024-01-25

## 2024-02-01 LAB
ABSOLUTE IMMATURE GRANULOCYTE: <0.03 K/UL (ref 0–0.58)
ALBUMIN SERPL-MCNC: 4.3 G/DL (ref 3.5–5.2)
ALP BLD-CCNC: 89 U/L (ref 35–104)
ALT SERPL-CCNC: 40 U/L (ref 0–32)
ANION GAP SERPL CALCULATED.3IONS-SCNC: 17 MMOL/L (ref 7–16)
AST SERPL-CCNC: 32 U/L (ref 0–31)
BASOPHILS ABSOLUTE: 0.06 K/UL (ref 0–0.2)
BASOPHILS RELATIVE PERCENT: 1 % (ref 0–2)
BILIRUB SERPL-MCNC: 0.3 MG/DL (ref 0–1.2)
BUN BLDV-MCNC: 24 MG/DL (ref 6–23)
CALCIUM SERPL-MCNC: 9.7 MG/DL (ref 8.6–10.2)
CHLORIDE BLD-SCNC: 100 MMOL/L (ref 98–107)
CO2: 22 MMOL/L (ref 22–29)
CREAT SERPL-MCNC: 1.1 MG/DL (ref 0.5–1)
EOSINOPHILS ABSOLUTE: 0.11 K/UL (ref 0.05–0.5)
EOSINOPHILS RELATIVE PERCENT: 1 % (ref 0–6)
GFR SERPL CREATININE-BSD FRML MDRD: 53 ML/MIN/1.73M2
GLUCOSE BLD-MCNC: 82 MG/DL (ref 74–99)
HCT VFR BLD CALC: 39.5 % (ref 34–48)
HEMOGLOBIN: 12.8 G/DL (ref 11.5–15.5)
IMMATURE GRANULOCYTES: 0 % (ref 0–5)
LYMPHOCYTES ABSOLUTE: 2.16 K/UL (ref 1.5–4)
LYMPHOCYTES RELATIVE PERCENT: 26 % (ref 20–42)
MCH RBC QN AUTO: 29.8 PG (ref 26–35)
MCHC RBC AUTO-ENTMCNC: 32.4 G/DL (ref 32–34.5)
MCV RBC AUTO: 92.1 FL (ref 80–99.9)
MONOCYTES ABSOLUTE: 0.6 K/UL (ref 0.1–0.95)
MONOCYTES RELATIVE PERCENT: 7 % (ref 2–12)
NEUTROPHILS ABSOLUTE: 5.52 K/UL (ref 1.8–7.3)
NEUTROPHILS RELATIVE PERCENT: 65 % (ref 43–80)
PDW BLD-RTO: 14 % (ref 11.5–15)
PLATELET # BLD: 288 K/UL (ref 130–450)
PMV BLD AUTO: 10.9 FL (ref 7–12)
POTASSIUM SERPL-SCNC: 3.7 MMOL/L (ref 3.5–5)
RBC # BLD: 4.29 M/UL (ref 3.5–5.5)
SEDIMENTATION RATE, ERYTHROCYTE: 44 MM/HR (ref 0–20)
SODIUM BLD-SCNC: 139 MMOL/L (ref 132–146)
TOTAL PROTEIN: 7.9 G/DL (ref 6.4–8.3)
WBC # BLD: 8.5 K/UL (ref 4.5–11.5)

## 2024-03-12 DIAGNOSIS — F32.0 MAJOR DEPRESSIVE DISORDER, SINGLE EPISODE, MILD (HCC): ICD-10-CM

## 2024-03-12 RX ORDER — DESVENLAFAXINE SUCCINATE 50 MG/1
50 TABLET, EXTENDED RELEASE ORAL NIGHTLY
Qty: 30 TABLET | Refills: 2 | Status: SHIPPED | OUTPATIENT
Start: 2024-03-12

## 2024-03-12 NOTE — TELEPHONE ENCOUNTER
Medication Refill Request    LOV 10/30/2023  NOV 5/1/2024    Lab Results   Component Value Date    CREATININE 1.1 (H) 02/01/2024

## 2024-04-28 ASSESSMENT — PATIENT HEALTH QUESTIONNAIRE - PHQ9
1. LITTLE INTEREST OR PLEASURE IN DOING THINGS: NOT AT ALL
6. FEELING BAD ABOUT YOURSELF - OR THAT YOU ARE A FAILURE OR HAVE LET YOURSELF OR YOUR FAMILY DOWN: NOT AT ALL
3. TROUBLE FALLING OR STAYING ASLEEP: NOT AT ALL
2. FEELING DOWN, DEPRESSED OR HOPELESS: NOT AT ALL
10. IF YOU CHECKED OFF ANY PROBLEMS, HOW DIFFICULT HAVE THESE PROBLEMS MADE IT FOR YOU TO DO YOUR WORK, TAKE CARE OF THINGS AT HOME, OR GET ALONG WITH OTHER PEOPLE: NOT DIFFICULT AT ALL
8. MOVING OR SPEAKING SO SLOWLY THAT OTHER PEOPLE COULD HAVE NOTICED. OR THE OPPOSITE, BEING SO FIGETY OR RESTLESS THAT YOU HAVE BEEN MOVING AROUND A LOT MORE THAN USUAL: NOT AT ALL
8. MOVING OR SPEAKING SO SLOWLY THAT OTHER PEOPLE COULD HAVE NOTICED. OR THE OPPOSITE - BEING SO FIDGETY OR RESTLESS THAT YOU HAVE BEEN MOVING AROUND A LOT MORE THAN USUAL: NOT AT ALL
SUM OF ALL RESPONSES TO PHQ QUESTIONS 1-9: 1
3. TROUBLE FALLING OR STAYING ASLEEP: NOT AT ALL
7. TROUBLE CONCENTRATING ON THINGS, SUCH AS READING THE NEWSPAPER OR WATCHING TELEVISION: NOT AT ALL
SUM OF ALL RESPONSES TO PHQ QUESTIONS 1-9: 1
5. POOR APPETITE OR OVEREATING: NOT AT ALL
9. THOUGHTS THAT YOU WOULD BE BETTER OFF DEAD, OR OF HURTING YOURSELF: NOT AT ALL
SUM OF ALL RESPONSES TO PHQ QUESTIONS 1-9: 1
1. LITTLE INTEREST OR PLEASURE IN DOING THINGS: NOT AT ALL
SUM OF ALL RESPONSES TO PHQ QUESTIONS 1-9: 1
9. THOUGHTS THAT YOU WOULD BE BETTER OFF DEAD, OR OF HURTING YOURSELF: NOT AT ALL
SUM OF ALL RESPONSES TO PHQ QUESTIONS 1-9: 1
SUM OF ALL RESPONSES TO PHQ9 QUESTIONS 1 & 2: 0
10. IF YOU CHECKED OFF ANY PROBLEMS, HOW DIFFICULT HAVE THESE PROBLEMS MADE IT FOR YOU TO DO YOUR WORK, TAKE CARE OF THINGS AT HOME, OR GET ALONG WITH OTHER PEOPLE: NOT DIFFICULT AT ALL
5. POOR APPETITE OR OVEREATING: NOT AT ALL
2. FEELING DOWN, DEPRESSED OR HOPELESS: NOT AT ALL
6. FEELING BAD ABOUT YOURSELF - OR THAT YOU ARE A FAILURE OR HAVE LET YOURSELF OR YOUR FAMILY DOWN: NOT AT ALL
7. TROUBLE CONCENTRATING ON THINGS, SUCH AS READING THE NEWSPAPER OR WATCHING TELEVISION: NOT AT ALL
4. FEELING TIRED OR HAVING LITTLE ENERGY: SEVERAL DAYS
4. FEELING TIRED OR HAVING LITTLE ENERGY: SEVERAL DAYS

## 2024-05-01 ENCOUNTER — OFFICE VISIT (OUTPATIENT)
Dept: FAMILY MEDICINE CLINIC | Age: 74
End: 2024-05-01

## 2024-05-01 VITALS
OXYGEN SATURATION: 93 % | DIASTOLIC BLOOD PRESSURE: 62 MMHG | SYSTOLIC BLOOD PRESSURE: 120 MMHG | WEIGHT: 205.4 LBS | BODY MASS INDEX: 37.57 KG/M2 | HEART RATE: 80 BPM | TEMPERATURE: 97 F

## 2024-05-01 DIAGNOSIS — J96.01 ACUTE RESPIRATORY FAILURE WITH HYPOXIA AND HYPERCARBIA (HCC): ICD-10-CM

## 2024-05-01 DIAGNOSIS — J96.02 ACUTE RESPIRATORY FAILURE WITH HYPOXIA AND HYPERCARBIA (HCC): ICD-10-CM

## 2024-05-01 DIAGNOSIS — F32.0 MAJOR DEPRESSIVE DISORDER, SINGLE EPISODE, MILD (HCC): ICD-10-CM

## 2024-05-01 DIAGNOSIS — E78.00 PURE HYPERCHOLESTEROLEMIA: Primary | ICD-10-CM

## 2024-05-01 DIAGNOSIS — M05.79 RHEUMATOID ARTHRITIS INVOLVING MULTIPLE SITES WITH POSITIVE RHEUMATOID FACTOR (HCC): ICD-10-CM

## 2024-05-01 DIAGNOSIS — E66.01 SEVERE OBESITY (BMI 35.0-39.9) WITH COMORBIDITY (HCC): ICD-10-CM

## 2024-05-01 DIAGNOSIS — J44.9 SEVERE CHRONIC OBSTRUCTIVE PULMONARY DISEASE (HCC): ICD-10-CM

## 2024-05-01 RX ORDER — HYDROCHLOROTHIAZIDE 12.5 MG/1
12.5 CAPSULE, GELATIN COATED ORAL EVERY MORNING
Qty: 90 CAPSULE | Refills: 1 | Status: SHIPPED | OUTPATIENT
Start: 2024-05-01

## 2024-05-01 NOTE — PROGRESS NOTES
5/3/2024    Current Outpatient Medications   Medication Sig Dispense Refill    hydroCHLOROthiazide 12.5 MG capsule Take 1 capsule by mouth every morning 90 capsule 1    budesonide (PULMICORT) 0.5 MG/2ML nebulizer suspension USE 1 VIAL VIA NEBULIZER TWICE DAILY 120 mL 5    desvenlafaxine succinate (PRISTIQ) 50 MG TB24 extended release tablet Take 1 tablet by mouth nightly 30 tablet 2    atorvastatin (LIPITOR) 20 MG tablet TAKE ONE TABLET BY MOUTH ONCE DAILY 90 tablet 1    arformoterol tartrate (BROVANA) 15 MCG/2ML NEBU Inhale the contents of 1 vial via nebulizer TWICE DAILY 120 mL 3    hydroCHLOROthiazide (HYDRODIURIL) 25 MG tablet Take 1 tablet by mouth every morning 90 tablet 1    celecoxib (CELEBREX) 200 MG capsule Take 1 capsule by mouth 2 times daily PRN      sulfaSALAzine (AZULFIDINE) 500 MG tablet Take 1 tablet by mouth 2 times daily      sodium chloride (OCEAN, BABY AYR) 0.65 % nasal spray 1 spray by Nasal route every 2 hours as needed for Congestion  0    OXYGEN Inhale 1 L/min into the lungs continuous      albuterol sulfate HFA (VENTOLIN HFA) 108 (90 Base) MCG/ACT inhaler Inhale 2 puffs into the lungs every 6 hours as needed for Wheezing 18 g 5     No current facility-administered medications for this visit.     Semiglutide      Elicia Bonner (: 1950 IS A 73 y.o. female ,Established patient, here for eval of COPD (6 month follow up)    Here with   Has portable O2 with her but not using it  93% pO2    ASSESSMENT / PLAN      1. Pure hypercholesterolemia  The 10-year ASCVD risk score (Billy DK, et al., 2019) is: 11.4%    Values used to calculate the score:      Age: 73 years      Sex: Female      Is Non- : No      Diabetic: No      Tobacco smoker: No      Systolic Blood Pressure: 120 mmHg      Is BP treated: No      HDL Cholesterol: 61 mg/dL      Total Cholesterol: 188 mg/dL  On lipitor 20 mg  Stable, cont meds recheck 6 mos    - hydroCHLOROthiazide 12.5 MG capsule;

## 2024-05-03 ASSESSMENT — ENCOUNTER SYMPTOMS
WHEEZING: 0
GASTROINTESTINAL NEGATIVE: 1
SHORTNESS OF BREATH: 0
EYES NEGATIVE: 1
ALLERGIC/IMMUNOLOGIC NEGATIVE: 1
CHEST TIGHTNESS: 0
COUGH: 0

## 2024-05-06 DIAGNOSIS — E78.00 PURE HYPERCHOLESTEROLEMIA: ICD-10-CM

## 2024-05-06 DIAGNOSIS — R06.89 HYPERCAPNIA: ICD-10-CM

## 2024-05-06 LAB
ALBUMIN: 4.3 G/DL (ref 3.5–5.2)
ALP BLD-CCNC: 95 U/L (ref 35–104)
ALT SERPL-CCNC: 35 U/L (ref 0–32)
ANION GAP SERPL CALCULATED.3IONS-SCNC: 20 MMOL/L (ref 7–16)
AST SERPL-CCNC: 28 U/L (ref 0–31)
BASOPHILS ABSOLUTE: 0.04 K/UL (ref 0–0.2)
BASOPHILS RELATIVE PERCENT: 1 % (ref 0–2)
BILIRUB SERPL-MCNC: 0.4 MG/DL (ref 0–1.2)
BUN BLDV-MCNC: 22 MG/DL (ref 6–23)
CALCIUM SERPL-MCNC: 9.5 MG/DL (ref 8.6–10.2)
CHLORIDE BLD-SCNC: 99 MMOL/L (ref 98–107)
CHOLESTEROL, TOTAL: 183 MG/DL
CO2: 21 MMOL/L (ref 22–29)
CREAT SERPL-MCNC: 1.1 MG/DL (ref 0.5–1)
EOSINOPHILS ABSOLUTE: 0.14 K/UL (ref 0.05–0.5)
EOSINOPHILS RELATIVE PERCENT: 2 % (ref 0–6)
GFR, ESTIMATED: 51 ML/MIN/1.73M2
GLUCOSE BLD-MCNC: 90 MG/DL (ref 74–99)
HCT VFR BLD CALC: 38.6 % (ref 34–48)
HDLC SERPL-MCNC: 57 MG/DL
HEMOGLOBIN: 12.5 G/DL (ref 11.5–15.5)
IMMATURE GRANULOCYTES %: 0 % (ref 0–5)
IMMATURE GRANULOCYTES ABSOLUTE: <0.03 K/UL (ref 0–0.58)
LDL CHOLESTEROL: 107 MG/DL
LYMPHOCYTES ABSOLUTE: 2.28 K/UL (ref 1.5–4)
LYMPHOCYTES RELATIVE PERCENT: 33 % (ref 20–42)
MCH RBC QN AUTO: 29.3 PG (ref 26–35)
MCHC RBC AUTO-ENTMCNC: 32.4 G/DL (ref 32–34.5)
MCV RBC AUTO: 90.4 FL (ref 80–99.9)
MONOCYTES ABSOLUTE: 0.49 K/UL (ref 0.1–0.95)
MONOCYTES RELATIVE PERCENT: 7 % (ref 2–12)
NEUTROPHILS ABSOLUTE: 3.93 K/UL (ref 1.8–7.3)
NEUTROPHILS RELATIVE PERCENT: 57 % (ref 43–80)
PDW BLD-RTO: 13.9 % (ref 11.5–15)
PLATELET # BLD: 253 K/UL (ref 130–450)
PMV BLD AUTO: 10.1 FL (ref 7–12)
POTASSIUM SERPL-SCNC: 3.8 MMOL/L (ref 3.5–5)
RBC # BLD: 4.27 M/UL (ref 3.5–5.5)
SEDIMENTATION RATE, ERYTHROCYTE: 46 MM/HR (ref 0–20)
SODIUM BLD-SCNC: 140 MMOL/L (ref 132–146)
TOTAL PROTEIN: 7.5 G/DL (ref 6.4–8.3)
TRIGL SERPL-MCNC: 93 MG/DL
VLDLC SERPL CALC-MCNC: 19 MG/DL
WBC # BLD: 6.9 K/UL (ref 4.5–11.5)

## 2024-06-12 DIAGNOSIS — F32.0 MAJOR DEPRESSIVE DISORDER, SINGLE EPISODE, MILD (HCC): ICD-10-CM

## 2024-06-12 RX ORDER — DESVENLAFAXINE SUCCINATE 50 MG/1
50 TABLET, EXTENDED RELEASE ORAL NIGHTLY
Qty: 30 TABLET | Refills: 2 | Status: SHIPPED | OUTPATIENT
Start: 2024-06-12

## 2024-06-28 DIAGNOSIS — R60.0 PEDAL EDEMA: ICD-10-CM

## 2024-06-28 RX ORDER — HYDROCHLOROTHIAZIDE 25 MG/1
25 TABLET ORAL EVERY MORNING
Qty: 90 TABLET | Refills: 1 | Status: SHIPPED | OUTPATIENT
Start: 2024-06-28

## 2024-06-28 NOTE — TELEPHONE ENCOUNTER
Medication Refill Request    LOV 5/1/2024  NOV 11/4/2024    Lab Results   Component Value Date    CREATININE 1.1 (H) 05/06/2024

## 2024-07-02 ENCOUNTER — HOSPITAL ENCOUNTER (OUTPATIENT)
Dept: CT IMAGING | Age: 74
Discharge: HOME | End: 2024-07-04
Payer: MEDICARE

## 2024-07-02 DIAGNOSIS — Z87.891 PERSONAL HISTORY OF TOBACCO USE: ICD-10-CM

## 2024-07-02 PROCEDURE — 71271 CT THORAX LUNG CANCER SCR C-: CPT

## 2024-07-07 DIAGNOSIS — E78.5 HYPERLIPIDEMIA, UNSPECIFIED: ICD-10-CM

## 2024-07-08 RX ORDER — ATORVASTATIN CALCIUM 20 MG/1
TABLET, FILM COATED ORAL
Qty: 90 TABLET | Refills: 1 | Status: SHIPPED | OUTPATIENT
Start: 2024-07-08

## 2024-08-13 DIAGNOSIS — F32.0 MAJOR DEPRESSIVE DISORDER, SINGLE EPISODE, MILD (HCC): ICD-10-CM

## 2024-08-13 RX ORDER — DESVENLAFAXINE SUCCINATE 50 MG/1
50 TABLET, EXTENDED RELEASE ORAL NIGHTLY
Qty: 30 TABLET | Refills: 2 | Status: SHIPPED | OUTPATIENT
Start: 2024-08-13

## 2024-11-03 SDOH — HEALTH STABILITY: PHYSICAL HEALTH: ON AVERAGE, HOW MANY MINUTES DO YOU ENGAGE IN EXERCISE AT THIS LEVEL?: 30 MIN

## 2024-11-03 SDOH — HEALTH STABILITY: PHYSICAL HEALTH: ON AVERAGE, HOW MANY DAYS PER WEEK DO YOU ENGAGE IN MODERATE TO STRENUOUS EXERCISE (LIKE A BRISK WALK)?: 5 DAYS

## 2024-11-03 ASSESSMENT — LIFESTYLE VARIABLES
HOW OFTEN DO YOU HAVE A DRINK CONTAINING ALCOHOL: 3
HOW OFTEN DO YOU HAVE A DRINK CONTAINING ALCOHOL: 2-4 TIMES A MONTH
HOW OFTEN DO YOU HAVE SIX OR MORE DRINKS ON ONE OCCASION: 1
HOW MANY STANDARD DRINKS CONTAINING ALCOHOL DO YOU HAVE ON A TYPICAL DAY: 1 OR 2
HOW MANY STANDARD DRINKS CONTAINING ALCOHOL DO YOU HAVE ON A TYPICAL DAY: 1

## 2024-11-03 ASSESSMENT — PATIENT HEALTH QUESTIONNAIRE - PHQ9
SUM OF ALL RESPONSES TO PHQ9 QUESTIONS 1 & 2: 1
3. TROUBLE FALLING OR STAYING ASLEEP: NOT AT ALL
1. LITTLE INTEREST OR PLEASURE IN DOING THINGS: NOT AT ALL
8. MOVING OR SPEAKING SO SLOWLY THAT OTHER PEOPLE COULD HAVE NOTICED. OR THE OPPOSITE, BEING SO FIGETY OR RESTLESS THAT YOU HAVE BEEN MOVING AROUND A LOT MORE THAN USUAL: NOT AT ALL
2. FEELING DOWN, DEPRESSED OR HOPELESS: SEVERAL DAYS
6. FEELING BAD ABOUT YOURSELF - OR THAT YOU ARE A FAILURE OR HAVE LET YOURSELF OR YOUR FAMILY DOWN: NOT AT ALL
SUM OF ALL RESPONSES TO PHQ QUESTIONS 1-9: 1
5. POOR APPETITE OR OVEREATING: NOT AT ALL
SUM OF ALL RESPONSES TO PHQ QUESTIONS 1-9: 1
9. THOUGHTS THAT YOU WOULD BE BETTER OFF DEAD, OR OF HURTING YOURSELF: NOT AT ALL
4. FEELING TIRED OR HAVING LITTLE ENERGY: NOT AT ALL
10. IF YOU CHECKED OFF ANY PROBLEMS, HOW DIFFICULT HAVE THESE PROBLEMS MADE IT FOR YOU TO DO YOUR WORK, TAKE CARE OF THINGS AT HOME, OR GET ALONG WITH OTHER PEOPLE: NOT DIFFICULT AT ALL
7. TROUBLE CONCENTRATING ON THINGS, SUCH AS READING THE NEWSPAPER OR WATCHING TELEVISION: NOT AT ALL

## 2024-11-04 ENCOUNTER — OFFICE VISIT (OUTPATIENT)
Dept: FAMILY MEDICINE CLINIC | Age: 74
End: 2024-11-04

## 2024-11-04 VITALS
BODY MASS INDEX: 32.54 KG/M2 | WEIGHT: 176.8 LBS | HEART RATE: 75 BPM | OXYGEN SATURATION: 94 % | TEMPERATURE: 96.8 F | DIASTOLIC BLOOD PRESSURE: 60 MMHG | HEIGHT: 62 IN | SYSTOLIC BLOOD PRESSURE: 138 MMHG

## 2024-11-04 DIAGNOSIS — Z23 NEED FOR INFLUENZA VACCINATION: ICD-10-CM

## 2024-11-04 DIAGNOSIS — E78.00 PURE HYPERCHOLESTEROLEMIA: ICD-10-CM

## 2024-11-04 DIAGNOSIS — Z00.00 MEDICARE ANNUAL WELLNESS VISIT, SUBSEQUENT: Primary | ICD-10-CM

## 2024-11-04 DIAGNOSIS — M05.79 RHEUMATOID ARTHRITIS INVOLVING MULTIPLE SITES WITH POSITIVE RHEUMATOID FACTOR (HCC): ICD-10-CM

## 2024-11-04 DIAGNOSIS — J45.901 ASTHMA WITH ACUTE EXACERBATION, UNSPECIFIED ASTHMA SEVERITY, UNSPECIFIED WHETHER PERSISTENT: ICD-10-CM

## 2024-11-04 DIAGNOSIS — E78.2 MODERATE MIXED HYPERLIPIDEMIA NOT REQUIRING STATIN THERAPY: ICD-10-CM

## 2024-11-04 DIAGNOSIS — F32.0 MAJOR DEPRESSIVE DISORDER, SINGLE EPISODE, MILD (HCC): ICD-10-CM

## 2024-11-04 DIAGNOSIS — R60.0 PEDAL EDEMA: ICD-10-CM

## 2024-11-04 RX ORDER — BUPROPION HYDROCHLORIDE 150 MG/1
150 TABLET ORAL EVERY MORNING
Qty: 30 TABLET | Refills: 3 | Status: SHIPPED | OUTPATIENT
Start: 2024-11-04

## 2024-11-04 RX ORDER — HYDROCHLOROTHIAZIDE 25 MG/1
25 TABLET ORAL EVERY MORNING
Qty: 90 TABLET | Refills: 1 | Status: SHIPPED | OUTPATIENT
Start: 2024-11-04

## 2024-11-04 RX ORDER — DESVENLAFAXINE 50 MG/1
50 TABLET, FILM COATED, EXTENDED RELEASE ORAL NIGHTLY
Qty: 30 TABLET | Refills: 2 | Status: SHIPPED | OUTPATIENT
Start: 2024-11-04

## 2024-11-04 RX ORDER — ATORVASTATIN CALCIUM 20 MG/1
20 TABLET, FILM COATED ORAL DAILY
Qty: 90 TABLET | Refills: 1 | Status: SHIPPED | OUTPATIENT
Start: 2024-11-04

## 2024-11-04 SDOH — ECONOMIC STABILITY: FOOD INSECURITY: WITHIN THE PAST 12 MONTHS, YOU WORRIED THAT YOUR FOOD WOULD RUN OUT BEFORE YOU GOT MONEY TO BUY MORE.: NEVER TRUE

## 2024-11-04 SDOH — ECONOMIC STABILITY: FOOD INSECURITY: WITHIN THE PAST 12 MONTHS, THE FOOD YOU BOUGHT JUST DIDN'T LAST AND YOU DIDN'T HAVE MONEY TO GET MORE.: NEVER TRUE

## 2024-11-04 SDOH — ECONOMIC STABILITY: INCOME INSECURITY: HOW HARD IS IT FOR YOU TO PAY FOR THE VERY BASICS LIKE FOOD, HOUSING, MEDICAL CARE, AND HEATING?: NOT HARD AT ALL

## 2024-11-04 NOTE — PROGRESS NOTES
pressure was measured at 120/60 mmHg, which is within the normal range. No changes to her current management were made.    1. Medicare annual wellness visit, subsequent  2. Pedal edema  -     hydroCHLOROthiazide (HYDRODIURIL) 25 MG tablet; Take 1 tablet by mouth every morning, Disp-90 tablet, R-1This prescription was filled on 4/6/2024. Any refills authorized will be placed on file.Normal  3. Hyperlipidemia, unspecified  -     atorvastatin (LIPITOR) 20 MG tablet; Take 1 tablet by mouth daily, Disp-90 tablet, R-1This prescription was filled on 4/15/2024. Any refills authorized will be placed on file.Normal  -     CBC with Auto Differential; Future  -     Comprehensive Metabolic Panel; Future  -     Lipid Panel; Future  4. Rheumatoid arthritis involving multiple sites with positive rheumatoid factor (HCC)  5. Asthma with acute exacerbation, unspecified asthma severity, unspecified whether persistent  6. Pure hypercholesterolemia  7. Major depressive disorder, single episode, mild (HCC)  Stable, cont meds recheck 6 mos  Not suicidal but feels less due to   -     buPROPion (WELLBUTRIN XL) 150 MG extended release tablet; Take 1 tablet by mouth every morning, Disp-30 tablet, R-3Normal  -     desvenlafaxine succinate (PRISTIQ) 50 MG TB24 extended release tablet; Take 1 tablet by mouth nightly, Disp-30 tablet, R-2This prescription was filled on 1/2/2024. Any refills authorized will be placed on file.Normal  8. Need for influenza vaccination    Return in 6 months (on 5/4/2025) for Medicare Annual Wellness Visit in 1 year.       The patient (or guardian, if applicable) and other individuals in attendance with the patient were advised that Artificial Intelligence will be utilized during this visit to record, process the conversation to generate a clinical note and to support improvement of the AI technology. The patient (or guardian, if applicable) and other individuals in attendance at the appointment consented to the use of

## 2024-11-05 ASSESSMENT — ENCOUNTER SYMPTOMS
EYES NEGATIVE: 1
SHORTNESS OF BREATH: 0
CHEST TIGHTNESS: 0
COUGH: 0
GASTROINTESTINAL NEGATIVE: 1
WHEEZING: 0
ALLERGIC/IMMUNOLOGIC NEGATIVE: 1

## 2024-11-13 DIAGNOSIS — E78.2 MODERATE MIXED HYPERLIPIDEMIA NOT REQUIRING STATIN THERAPY: ICD-10-CM

## 2024-11-13 LAB
ALBUMIN: 4 G/DL (ref 3.5–5.2)
ALP BLD-CCNC: 95 U/L (ref 35–104)
ALT SERPL-CCNC: 26 U/L (ref 0–32)
ANION GAP SERPL CALCULATED.3IONS-SCNC: 9 MMOL/L (ref 7–16)
AST SERPL-CCNC: 24 U/L (ref 0–31)
BASOPHILS ABSOLUTE: 0.06 K/UL (ref 0–0.2)
BASOPHILS RELATIVE PERCENT: 1 % (ref 0–2)
BILIRUB SERPL-MCNC: 0.4 MG/DL (ref 0–1.2)
BUN BLDV-MCNC: 22 MG/DL (ref 6–23)
CALCIUM SERPL-MCNC: 9.1 MG/DL (ref 8.6–10.2)
CHLORIDE BLD-SCNC: 101 MMOL/L (ref 98–107)
CO2: 29 MMOL/L (ref 22–29)
CREAT SERPL-MCNC: 1.3 MG/DL (ref 0.5–1)
EOSINOPHILS ABSOLUTE: 0.21 K/UL (ref 0.05–0.5)
EOSINOPHILS RELATIVE PERCENT: 3 % (ref 0–6)
GFR, ESTIMATED: 45 ML/MIN/1.73M2
GLUCOSE BLD-MCNC: 69 MG/DL (ref 74–99)
HCT VFR BLD CALC: 42.7 % (ref 34–48)
HEMOGLOBIN: 13.6 G/DL (ref 11.5–15.5)
IMMATURE GRANULOCYTES %: 0 % (ref 0–5)
IMMATURE GRANULOCYTES ABSOLUTE: <0.03 K/UL (ref 0–0.58)
LYMPHOCYTES ABSOLUTE: 2.13 K/UL (ref 1.5–4)
LYMPHOCYTES RELATIVE PERCENT: 27 % (ref 20–42)
MCH RBC QN AUTO: 28.9 PG (ref 26–35)
MCHC RBC AUTO-ENTMCNC: 31.9 G/DL (ref 32–34.5)
MCV RBC AUTO: 90.9 FL (ref 80–99.9)
MONOCYTES ABSOLUTE: 0.52 K/UL (ref 0.1–0.95)
MONOCYTES RELATIVE PERCENT: 7 % (ref 2–12)
NEUTROPHILS ABSOLUTE: 5.09 K/UL (ref 1.8–7.3)
NEUTROPHILS RELATIVE PERCENT: 64 % (ref 43–80)
PDW BLD-RTO: 15.2 % (ref 11.5–15)
PLATELET # BLD: 253 K/UL (ref 130–450)
PMV BLD AUTO: 11.2 FL (ref 7–12)
POTASSIUM SERPL-SCNC: 3.5 MMOL/L (ref 3.5–5)
RBC # BLD: 4.7 M/UL (ref 3.5–5.5)
SODIUM BLD-SCNC: 139 MMOL/L (ref 132–146)
TOTAL PROTEIN: 7.2 G/DL (ref 6.4–8.3)
WBC # BLD: 8 K/UL (ref 4.5–11.5)

## 2024-11-14 LAB
CHOLESTEROL, TOTAL: 189 MG/DL
HDLC SERPL-MCNC: 60 MG/DL
LDL CHOLESTEROL: 114 MG/DL
TRIGL SERPL-MCNC: 77 MG/DL
VLDLC SERPL CALC-MCNC: 15 MG/DL

## 2024-12-16 ENCOUNTER — PATIENT MESSAGE (OUTPATIENT)
Dept: FAMILY MEDICINE CLINIC | Age: 74
End: 2024-12-16

## 2024-12-16 DIAGNOSIS — U07.1 COVID-19: Primary | ICD-10-CM

## 2024-12-16 DIAGNOSIS — F32.0 MAJOR DEPRESSIVE DISORDER, SINGLE EPISODE, MILD (HCC): ICD-10-CM

## 2024-12-16 DIAGNOSIS — U07.1 COVID-19: ICD-10-CM

## 2024-12-16 NOTE — TELEPHONE ENCOUNTER
Pt called in wanting to let dr know about testing positive and is requesting paxlovid. Please advise

## 2025-01-29 DIAGNOSIS — F32.0 MAJOR DEPRESSIVE DISORDER, SINGLE EPISODE, MILD (HCC): ICD-10-CM

## 2025-01-29 RX ORDER — DESVENLAFAXINE 50 MG/1
50 TABLET, FILM COATED, EXTENDED RELEASE ORAL NIGHTLY
Qty: 30 TABLET | Refills: 2 | Status: SHIPPED | OUTPATIENT
Start: 2025-01-29

## 2025-01-29 NOTE — TELEPHONE ENCOUNTER
Name of Medication(s) Requested:  Requested Prescriptions     Pending Prescriptions Disp Refills    desvenlafaxine succinate (PRISTIQ) 50 MG TB24 extended release tablet [Pharmacy Med Name: desvenlafaxine succinate ER 50 mg tablet,extended release 24 hr] 30 tablet 2     Sig: TAKE ONE TABLET BY MOUTH AT BEDTIME       Medication is on current medication list Yes    Dosage and directions were verified? Yes    Quantity verified: 30 day supply     Pharmacy Verified?  Yes    Last Appointment:  11/4/2024    Future appts:  Future Appointments   Date Time Provider Department Center   5/7/2025  3:00 PM Tiffany Huff MD [facility] DEP   5/29/2025  1:15 PM Kimberlyn Rapp APRN - NP [facility]   11/6/2025  3:00 PM Isa Huff DO [place] DEP        (If no appt send self scheduling link. .REFILLAPPT)  Scheduling request sent?     [] Yes  [x] No    Does patient need updated?  [] Yes  [x] No

## 2025-04-25 DIAGNOSIS — F32.0 MAJOR DEPRESSIVE DISORDER, SINGLE EPISODE, MILD: ICD-10-CM

## 2025-04-25 RX ORDER — DESVENLAFAXINE 50 MG/1
50 TABLET, FILM COATED, EXTENDED RELEASE ORAL NIGHTLY
Qty: 30 TABLET | Refills: 2 | Status: SHIPPED | OUTPATIENT
Start: 2025-04-25

## 2025-04-25 NOTE — TELEPHONE ENCOUNTER
Name of Medication(s) Requested:  Requested Prescriptions     Pending Prescriptions Disp Refills    desvenlafaxine succinate (PRISTIQ) 50 MG TB24 extended release tablet [Pharmacy Med Name: desvenlafaxine succinate ER 50 mg tablet,extended release 24 hr] 30 tablet 2     Sig: TAKE ONE TABLET BY MOUTH AT BEDTIME       Medication is on current medication list Yes    Dosage and directions were verified? Yes    Quantity verified: 30 day supply     Pharmacy Verified?  Yes    Last Appointment:  11/4/2024    Future appts:  Future Appointments   Date Time Provider Department Center   5/7/2025  3:00 PM Tiffany Huff MD [facility] DEP   5/29/2025  1:15 PM Kimberlyn Rapp APRN - NP [facility]   11/6/2025  3:00 PM Isa Huff DO [place] DE[facility]        (If no appt send self scheduling link. .REFILLAPPT)  Scheduling request sent?     [] Yes  [x] No    Does patient need updated?  [] Yes  [x] No

## 2025-05-04 SDOH — ECONOMIC STABILITY: FOOD INSECURITY: WITHIN THE PAST 12 MONTHS, YOU WORRIED THAT YOUR FOOD WOULD RUN OUT BEFORE YOU GOT MONEY TO BUY MORE.: NEVER TRUE

## 2025-05-04 SDOH — ECONOMIC STABILITY: INCOME INSECURITY: IN THE LAST 12 MONTHS, WAS THERE A TIME WHEN YOU WERE NOT ABLE TO PAY THE MORTGAGE OR RENT ON TIME?: NO

## 2025-05-04 SDOH — ECONOMIC STABILITY: FOOD INSECURITY: WITHIN THE PAST 12 MONTHS, THE FOOD YOU BOUGHT JUST DIDN'T LAST AND YOU DIDN'T HAVE MONEY TO GET MORE.: NEVER TRUE

## 2025-05-04 ASSESSMENT — PATIENT HEALTH QUESTIONNAIRE - PHQ9
2. FEELING DOWN, DEPRESSED OR HOPELESS: NOT AT ALL
3. TROUBLE FALLING OR STAYING ASLEEP: SEVERAL DAYS
2. FEELING DOWN, DEPRESSED OR HOPELESS: NOT AT ALL
10. IF YOU CHECKED OFF ANY PROBLEMS, HOW DIFFICULT HAVE THESE PROBLEMS MADE IT FOR YOU TO DO YOUR WORK, TAKE CARE OF THINGS AT HOME, OR GET ALONG WITH OTHER PEOPLE: NOT DIFFICULT AT ALL
9. THOUGHTS THAT YOU WOULD BE BETTER OFF DEAD, OR OF HURTING YOURSELF: NOT AT ALL
8. MOVING OR SPEAKING SO SLOWLY THAT OTHER PEOPLE COULD HAVE NOTICED. OR THE OPPOSITE - BEING SO FIDGETY OR RESTLESS THAT YOU HAVE BEEN MOVING AROUND A LOT MORE THAN USUAL: NOT AT ALL
10. IF YOU CHECKED OFF ANY PROBLEMS, HOW DIFFICULT HAVE THESE PROBLEMS MADE IT FOR YOU TO DO YOUR WORK, TAKE CARE OF THINGS AT HOME, OR GET ALONG WITH OTHER PEOPLE: NOT DIFFICULT AT ALL
4. FEELING TIRED OR HAVING LITTLE ENERGY: SEVERAL DAYS
8. MOVING OR SPEAKING SO SLOWLY THAT OTHER PEOPLE COULD HAVE NOTICED. OR THE OPPOSITE, BEING SO FIGETY OR RESTLESS THAT YOU HAVE BEEN MOVING AROUND A LOT MORE THAN USUAL: NOT AT ALL
SUM OF ALL RESPONSES TO PHQ QUESTIONS 1-9: 3
5. POOR APPETITE OR OVEREATING: SEVERAL DAYS
SUM OF ALL RESPONSES TO PHQ QUESTIONS 1-9: 3
7. TROUBLE CONCENTRATING ON THINGS, SUCH AS READING THE NEWSPAPER OR WATCHING TELEVISION: NOT AT ALL
SUM OF ALL RESPONSES TO PHQ QUESTIONS 1-9: 3
1. LITTLE INTEREST OR PLEASURE IN DOING THINGS: NOT AT ALL
SUM OF ALL RESPONSES TO PHQ QUESTIONS 1-9: 3
1. LITTLE INTEREST OR PLEASURE IN DOING THINGS: NOT AT ALL
SUM OF ALL RESPONSES TO PHQ QUESTIONS 1-9: 3
5. POOR APPETITE OR OVEREATING: SEVERAL DAYS
9. THOUGHTS THAT YOU WOULD BE BETTER OFF DEAD, OR OF HURTING YOURSELF: NOT AT ALL
6. FEELING BAD ABOUT YOURSELF - OR THAT YOU ARE A FAILURE OR HAVE LET YOURSELF OR YOUR FAMILY DOWN: NOT AT ALL
7. TROUBLE CONCENTRATING ON THINGS, SUCH AS READING THE NEWSPAPER OR WATCHING TELEVISION: NOT AT ALL
6. FEELING BAD ABOUT YOURSELF - OR THAT YOU ARE A FAILURE OR HAVE LET YOURSELF OR YOUR FAMILY DOWN: NOT AT ALL
4. FEELING TIRED OR HAVING LITTLE ENERGY: SEVERAL DAYS
3. TROUBLE FALLING OR STAYING ASLEEP: SEVERAL DAYS

## 2025-05-07 ENCOUNTER — OFFICE VISIT (OUTPATIENT)
Dept: FAMILY MEDICINE CLINIC | Age: 75
End: 2025-05-07
Payer: MEDICARE

## 2025-05-07 VITALS
BODY MASS INDEX: 28.71 KG/M2 | OXYGEN SATURATION: 92 % | SYSTOLIC BLOOD PRESSURE: 126 MMHG | WEIGHT: 156 LBS | RESPIRATION RATE: 20 BRPM | DIASTOLIC BLOOD PRESSURE: 80 MMHG | HEART RATE: 82 BPM | TEMPERATURE: 97.9 F | HEIGHT: 62 IN

## 2025-05-07 DIAGNOSIS — E78.2 MODERATE MIXED HYPERLIPIDEMIA NOT REQUIRING STATIN THERAPY: ICD-10-CM

## 2025-05-07 DIAGNOSIS — J45.901 ASTHMA WITH ACUTE EXACERBATION, UNSPECIFIED ASTHMA SEVERITY, UNSPECIFIED WHETHER PERSISTENT: ICD-10-CM

## 2025-05-07 DIAGNOSIS — R06.89 HYPERCAPNIA: ICD-10-CM

## 2025-05-07 DIAGNOSIS — F32.0 MAJOR DEPRESSIVE DISORDER, SINGLE EPISODE, MILD: Primary | ICD-10-CM

## 2025-05-07 DIAGNOSIS — M05.79 RHEUMATOID ARTHRITIS INVOLVING MULTIPLE SITES WITH POSITIVE RHEUMATOID FACTOR (HCC): ICD-10-CM

## 2025-05-07 DIAGNOSIS — J44.9 STAGE 4 VERY SEVERE COPD BY GOLD CLASSIFICATION (HCC): ICD-10-CM

## 2025-05-07 DIAGNOSIS — Z87.891 HX OF TOBACCO USE, PRESENTING HAZARDS TO HEALTH: ICD-10-CM

## 2025-05-07 PROCEDURE — 1123F ACP DISCUSS/DSCN MKR DOCD: CPT | Performed by: FAMILY MEDICINE

## 2025-05-07 PROCEDURE — 1090F PRES/ABSN URINE INCON ASSESS: CPT | Performed by: FAMILY MEDICINE

## 2025-05-07 PROCEDURE — G8417 CALC BMI ABV UP PARAM F/U: HCPCS | Performed by: FAMILY MEDICINE

## 2025-05-07 PROCEDURE — 4004F PT TOBACCO SCREEN RCVD TLK: CPT | Performed by: FAMILY MEDICINE

## 2025-05-07 PROCEDURE — G8400 PT W/DXA NO RESULTS DOC: HCPCS | Performed by: FAMILY MEDICINE

## 2025-05-07 PROCEDURE — 3017F COLORECTAL CA SCREEN DOC REV: CPT | Performed by: FAMILY MEDICINE

## 2025-05-07 PROCEDURE — 3023F SPIROM DOC REV: CPT | Performed by: FAMILY MEDICINE

## 2025-05-07 PROCEDURE — G8427 DOCREV CUR MEDS BY ELIG CLIN: HCPCS | Performed by: FAMILY MEDICINE

## 2025-05-07 PROCEDURE — 99214 OFFICE O/P EST MOD 30 MIN: CPT | Performed by: FAMILY MEDICINE

## 2025-05-07 RX ORDER — ATORVASTATIN CALCIUM 20 MG/1
20 TABLET, FILM COATED ORAL DAILY
Qty: 90 TABLET | Refills: 1 | Status: SHIPPED | OUTPATIENT
Start: 2025-05-07

## 2025-05-07 RX ORDER — DESVENLAFAXINE 50 MG/1
50 TABLET, FILM COATED, EXTENDED RELEASE ORAL DAILY
Qty: 90 TABLET | Refills: 0 | Status: SHIPPED | OUTPATIENT
Start: 2025-05-07 | End: 2025-08-05

## 2025-05-07 RX ORDER — ALBUTEROL SULFATE 90 UG/1
2 INHALANT RESPIRATORY (INHALATION) EVERY 6 HOURS PRN
Qty: 18 G | Refills: 5 | Status: SHIPPED | OUTPATIENT
Start: 2025-05-07

## 2025-05-07 NOTE — PROGRESS NOTES
Elicia Bonner (:  1950) is a 74 y.o. female, Established patient, here for evaluation of the following chief complaint(s):  COPD, Medication Refill, Anxiety, Depression, and Hyperlipidemia      Here with       Subjective   History of Present Illness  The patient presents for evaluation of allergies, autoimmune condition, and medication management.    She has been experiencing a series of allergic reactions, initially to grass, followed by trees, and currently to mold. She manages these symptoms with Claritin and a nasal spray.    During the winter season, she contracted COVID-19 and was unable to obtain medication until she tested positive on a Saturday afternoon. She experienced a condition similar to trench mouth as a side effect of Paxlovid, which limited her diet to soup and ice cream for two weeks. She also reported difficulty chewing and bleeding gums.    She continues to see Dr. Anthony and has a virtual appointment scheduled for this month, with an in-person visit planned for the following year. She is considering a referral to another rheumatologist due to Dr. Anthony's impending assisted. She had a fasting blood test during her last visit and another one today for Dr. Anthony.     She has discontinued Wellbutrin and her diuretic medication. She is currently on Pristiq 50 mg daily, sulfasalazine, and Celebrex. She uses Ventolin and Pulmicort for respiratory management.    SOCIAL HISTORY  She does not smoke.        Review of Systems   Constitutional:  Negative for activity change, appetite change, fatigue and unexpected weight change.   HENT:  Negative for congestion.    Eyes: Negative.  Negative for visual disturbance.   Respiratory:  Positive for shortness of breath (improving). Negative for cough, chest tightness and wheezing.    Cardiovascular:  Negative for chest pain and palpitations.   Gastrointestinal: Negative.    Endocrine: Negative.    Genitourinary: Negative.

## 2025-05-13 PROBLEM — J96.01 ACUTE RESPIRATORY FAILURE WITH HYPOXIA AND HYPERCARBIA (HCC): Status: RESOLVED | Noted: 2024-05-01 | Resolved: 2025-05-13

## 2025-05-13 PROBLEM — J96.02 ACUTE RESPIRATORY FAILURE WITH HYPOXIA AND HYPERCARBIA (HCC): Status: RESOLVED | Noted: 2024-05-01 | Resolved: 2025-05-13

## 2025-05-13 ASSESSMENT — ENCOUNTER SYMPTOMS
CHEST TIGHTNESS: 0
ALLERGIC/IMMUNOLOGIC NEGATIVE: 1
WHEEZING: 0
GASTROINTESTINAL NEGATIVE: 1
EYES NEGATIVE: 1
COUGH: 0
SHORTNESS OF BREATH: 1

## 2025-06-29 DIAGNOSIS — E78.2 MODERATE MIXED HYPERLIPIDEMIA NOT REQUIRING STATIN THERAPY: ICD-10-CM

## 2025-06-30 RX ORDER — ATORVASTATIN CALCIUM 20 MG/1
20 TABLET, FILM COATED ORAL DAILY
Qty: 90 TABLET | Refills: 1 | Status: SHIPPED | OUTPATIENT
Start: 2025-06-30

## 2025-06-30 NOTE — TELEPHONE ENCOUNTER
Name of Medication(s) Requested:  Requested Prescriptions     Pending Prescriptions Disp Refills    atorvastatin (LIPITOR) 20 MG tablet [Pharmacy Med Name: atorvastatin 20 mg tablet] 90 tablet 1     Sig: TAKE ONE TABLET BY MOUTH ONCE DAILY       Medication is on current medication list Yes    Dosage and directions were verified? Yes    Quantity verified: 90 day supply     Pharmacy Verified?  Yes    Last Appointment:  5/7/2025    Future appts:  Future Appointments   Date Time Provider Department Center   7/15/2025 11:00 AM [facility]   11/6/2025  3:00 PM Isa Huff DO [place] [facility] DEP   12/3/2025  1:45 PM Kimberlyn Rapp, APRN - NP [facility]        (If no appt send self scheduling link. .REFILLAPPT)  Scheduling request sent?     [] Yes  [x] No    Does patient need updated?  [] Yes  [x] No

## 2025-07-15 ENCOUNTER — HOSPITAL ENCOUNTER (OUTPATIENT)
Dept: CT IMAGING | Age: 75
Discharge: HOME | End: 2025-07-17
Payer: MEDICARE

## 2025-07-15 DIAGNOSIS — Z87.891 PERSONAL HISTORY OF TOBACCO USE: ICD-10-CM

## 2025-07-15 PROCEDURE — 71271 CT THORAX LUNG CANCER SCR C-: CPT

## 2025-08-23 DIAGNOSIS — F32.0 MAJOR DEPRESSIVE DISORDER, SINGLE EPISODE, MILD: ICD-10-CM

## 2025-08-25 RX ORDER — DESVENLAFAXINE 50 MG/1
50 TABLET, FILM COATED, EXTENDED RELEASE ORAL DAILY
Qty: 90 TABLET | Refills: 1 | Status: SHIPPED | OUTPATIENT
Start: 2025-08-25